# Patient Record
Sex: MALE | Race: WHITE | NOT HISPANIC OR LATINO
[De-identification: names, ages, dates, MRNs, and addresses within clinical notes are randomized per-mention and may not be internally consistent; named-entity substitution may affect disease eponyms.]

---

## 2017-01-10 ENCOUNTER — TRANSCRIPTION ENCOUNTER (OUTPATIENT)
Age: 62
End: 2017-01-10

## 2017-03-15 ENCOUNTER — OUTPATIENT (OUTPATIENT)
Dept: OUTPATIENT SERVICES | Facility: HOSPITAL | Age: 62
LOS: 1 days | End: 2017-03-15
Payer: COMMERCIAL

## 2017-03-15 ENCOUNTER — APPOINTMENT (OUTPATIENT)
Dept: MRI IMAGING | Facility: CLINIC | Age: 62
End: 2017-03-15

## 2017-03-15 DIAGNOSIS — Z00.8 ENCOUNTER FOR OTHER GENERAL EXAMINATION: ICD-10-CM

## 2017-03-15 PROCEDURE — 73721 MRI JNT OF LWR EXTRE W/O DYE: CPT

## 2017-04-14 ENCOUNTER — APPOINTMENT (OUTPATIENT)
Dept: MRI IMAGING | Facility: CLINIC | Age: 62
End: 2017-04-14

## 2017-04-14 ENCOUNTER — OUTPATIENT (OUTPATIENT)
Dept: OUTPATIENT SERVICES | Facility: HOSPITAL | Age: 62
LOS: 1 days | End: 2017-04-14
Payer: COMMERCIAL

## 2017-04-14 DIAGNOSIS — Z00.8 ENCOUNTER FOR OTHER GENERAL EXAMINATION: ICD-10-CM

## 2017-04-14 PROCEDURE — 72197 MRI PELVIS W/O & W/DYE: CPT

## 2017-04-14 PROCEDURE — A9585: CPT

## 2017-04-14 PROCEDURE — 82565 ASSAY OF CREATININE: CPT

## 2017-05-14 ENCOUNTER — EMERGENCY (EMERGENCY)
Facility: HOSPITAL | Age: 62
LOS: 0 days | Discharge: HOME | End: 2017-05-14
Attending: EMERGENCY MEDICINE | Admitting: EMERGENCY MEDICINE
Payer: OTHER MISCELLANEOUS

## 2017-05-14 VITALS — HEIGHT: 69 IN | WEIGHT: 169.98 LBS

## 2017-05-14 VITALS
RESPIRATION RATE: 18 BRPM | OXYGEN SATURATION: 100 % | SYSTOLIC BLOOD PRESSURE: 123 MMHG | DIASTOLIC BLOOD PRESSURE: 89 MMHG | HEART RATE: 53 BPM | TEMPERATURE: 99 F

## 2017-05-14 DIAGNOSIS — I82.441 ACUTE EMBOLISM AND THROMBOSIS OF RIGHT TIBIAL VEIN: ICD-10-CM

## 2017-05-14 DIAGNOSIS — I48.91 UNSPECIFIED ATRIAL FIBRILLATION: ICD-10-CM

## 2017-05-14 LAB
ANION GAP SERPL CALC-SCNC: 7 MMOL/L — SIGNIFICANT CHANGE UP (ref 5–17)
APTT BLD: 27.7 SEC — SIGNIFICANT CHANGE UP (ref 27.5–37.4)
BASOPHILS # BLD AUTO: 0.1 K/UL — SIGNIFICANT CHANGE UP (ref 0–0.2)
BASOPHILS NFR BLD AUTO: 1.6 % — SIGNIFICANT CHANGE UP (ref 0–2)
BUN SERPL-MCNC: 17 MG/DL — SIGNIFICANT CHANGE UP (ref 7–23)
CALCIUM SERPL-MCNC: 9.4 MG/DL — SIGNIFICANT CHANGE UP (ref 8.5–10.1)
CHLORIDE SERPL-SCNC: 101 MMOL/L — SIGNIFICANT CHANGE UP (ref 96–108)
CO2 SERPL-SCNC: 32 MMOL/L — HIGH (ref 22–31)
CREAT SERPL-MCNC: 1.08 MG/DL — SIGNIFICANT CHANGE UP (ref 0.5–1.3)
EOSINOPHIL # BLD AUTO: 0.1 K/UL — SIGNIFICANT CHANGE UP (ref 0–0.5)
EOSINOPHIL NFR BLD AUTO: 0.8 % — SIGNIFICANT CHANGE UP (ref 0–6)
GLUCOSE SERPL-MCNC: 104 MG/DL — HIGH (ref 70–99)
HCT VFR BLD CALC: 47.7 % — SIGNIFICANT CHANGE UP (ref 39–50)
HGB BLD-MCNC: 15.5 G/DL — SIGNIFICANT CHANGE UP (ref 13–17)
INR BLD: 0.95 RATIO — SIGNIFICANT CHANGE UP (ref 0.88–1.16)
LYMPHOCYTES # BLD AUTO: 2.8 K/UL — SIGNIFICANT CHANGE UP (ref 1–3.3)
LYMPHOCYTES # BLD AUTO: 36.8 % — SIGNIFICANT CHANGE UP (ref 13–44)
MCHC RBC-ENTMCNC: 30.4 PG — SIGNIFICANT CHANGE UP (ref 27–34)
MCHC RBC-ENTMCNC: 32.4 GM/DL — SIGNIFICANT CHANGE UP (ref 32–36)
MCV RBC AUTO: 93.8 FL — SIGNIFICANT CHANGE UP (ref 80–100)
MONOCYTES # BLD AUTO: 0.5 K/UL — SIGNIFICANT CHANGE UP (ref 0–0.9)
MONOCYTES NFR BLD AUTO: 6.3 % — SIGNIFICANT CHANGE UP (ref 2–14)
NEUTROPHILS # BLD AUTO: 4.2 K/UL — SIGNIFICANT CHANGE UP (ref 1.8–7.4)
NEUTROPHILS NFR BLD AUTO: 54.6 % — SIGNIFICANT CHANGE UP (ref 43–77)
PLATELET # BLD AUTO: 209 K/UL — SIGNIFICANT CHANGE UP (ref 150–400)
POTASSIUM SERPL-MCNC: 4 MMOL/L — SIGNIFICANT CHANGE UP (ref 3.5–5.3)
POTASSIUM SERPL-SCNC: 4 MMOL/L — SIGNIFICANT CHANGE UP (ref 3.5–5.3)
PROTHROM AB SERPL-ACNC: 10.2 SEC — SIGNIFICANT CHANGE UP (ref 9.8–12.7)
RBC # BLD: 5.08 M/UL — SIGNIFICANT CHANGE UP (ref 4.2–5.8)
RBC # FLD: 12.4 % — SIGNIFICANT CHANGE UP (ref 10.3–14.5)
SODIUM SERPL-SCNC: 140 MMOL/L — SIGNIFICANT CHANGE UP (ref 135–145)
WBC # BLD: 7.7 K/UL — SIGNIFICANT CHANGE UP (ref 3.8–10.5)
WBC # FLD AUTO: 7.7 K/UL — SIGNIFICANT CHANGE UP (ref 3.8–10.5)

## 2017-05-14 PROCEDURE — 99285 EMERGENCY DEPT VISIT HI MDM: CPT

## 2017-05-14 PROCEDURE — 93971 EXTREMITY STUDY: CPT | Mod: 26,RT

## 2017-05-14 RX ORDER — APIXABAN 2.5 MG/1
5 TABLET, FILM COATED ORAL
Qty: 0 | Refills: 0 | Status: DISCONTINUED | OUTPATIENT
Start: 2017-05-14 | End: 2017-05-14

## 2017-05-14 RX ORDER — APIXABAN 2.5 MG/1
1 TABLET, FILM COATED ORAL
Qty: 20 | Refills: 0 | OUTPATIENT
Start: 2017-05-14 | End: 2017-05-24

## 2017-05-14 RX ADMIN — APIXABAN 5 MILLIGRAM(S): 2.5 TABLET, FILM COATED ORAL at 13:39

## 2017-05-14 NOTE — ED STATDOCS - CHPI ED SYMPTOM NEG
no burning urination/no hematuria/no diarrhea/no dysuria/no palpitations/no fever/no nausea/no abdominal distention/no blood in stool/no vomiting

## 2017-05-14 NOTE — ED STATDOCS - NS ED MD SCRIBE ATTENDING SCRIBE SECTIONS
PAST MEDICAL/SURGICAL/SOCIAL HISTORY/HIV/REVIEW OF SYSTEMS/PROGRESS NOTE/CONSULTATIONS/SHIFT CHANGE/VITAL SIGNS( Pullset)/PHYSICAL EXAM/DISPOSITION/INTAKE ASSESSMENT/SCREENINGS/HISTORY OF PRESENT ILLNESS/RESULTS

## 2017-05-14 NOTE — ED STATDOCS - CARE PLAN
Principal Discharge DX:	Acute deep vein thrombosis (DVT) of tibial vein of right lower extremity  Secondary Diagnosis:	Thrombophlebitis

## 2017-05-14 NOTE — ED STATDOCS - PROGRESS NOTE DETAILS
Patient seen and evaluated, ED attending note and orders reviewed, will continue with patient follow up and care -Jeramy Farah PA-C Patient updated on labs and sono, case d/w his surgeon Dr. Rome Mullins [phone], he is ok with plan to place patient on xarelto and will arrange follow up with vascular, patient has not been taking ASA since the surgery, call placed to patient cardiologist Dr. Odonnell to discuss plan, awaiting call back -Jeramy Farah PA-C Case d/w Dr. Restrepo who is covering for Dr. Odonnell, he recommends eliquis 5mg BID and they will see him in office in 1 week -Jeramy Farah PA-C Counseled patient on DVT risks including PE, reviewed ss of emergency and when to return to ER, Patient denies CP and SOB at this time, understands that he must come to ER if these symptoms develop, counseled patient on eliquis for anticoagulation, patient is to avoid NSAIDs, he verbalized understanding, has appt with his surgeon in 3 days and will follow up with cardiology -Jeramy Farah PA-C

## 2017-05-14 NOTE — ED STATDOCS - MUSCULOSKELETAL, MLM
Right post knee tenderness, scar of arthroscopy ant right knee, range of motion is not limited and there is no muscle tenderness.

## 2017-05-14 NOTE — ED STATDOCS - OBJECTIVE STATEMENT
60 y/o M PMHx Arthroscopy, Afib, on asa, presents to the ED c/o right knee pain. The pt provides that he was referred to the ED for eval blood clot. The pt provides he had an arthroscopy done 2 days ago, and started having right post knee pain, and is unable to move the jt. No h./o sob, cp, cough, headache, fever, chills, dizziness, abd pain, nvd, or urinary incontinence.

## 2017-05-14 NOTE — ED STATDOCS - ATTENDING CONTRIBUTION TO CARE
I, Shae Mccarthy, performed the initial face to face bedside interview with this patient regarding history of present illness, review of symptoms and relevant past medical, social and family history.  I completed an independent physical examination.  I was the initial provider who evaluated this patient. I have signed out the follow up of any pending tests (i.e. labs, radiological studies) to the ACP.  I have communicated the patient’s plan of care and disposition with the ACP.  The history, relevant review of systems, past medical and surgical history, medical decision making, and physical examination was documented by the scribe in my presence and I attest to the accuracy of the documentation.  I personally saw the patient with the PA, and completed the key components of the history and physical exam. I then discussed the management plan with the PA.  Will anticoagulate for DVT - patient's cardiologist aware and will follow.

## 2017-06-29 ENCOUNTER — APPOINTMENT (OUTPATIENT)
Dept: MRI IMAGING | Facility: CLINIC | Age: 62
End: 2017-06-29

## 2017-06-29 ENCOUNTER — OUTPATIENT (OUTPATIENT)
Dept: OUTPATIENT SERVICES | Facility: HOSPITAL | Age: 62
LOS: 1 days | End: 2017-06-29
Payer: COMMERCIAL

## 2017-06-29 DIAGNOSIS — Z00.8 ENCOUNTER FOR OTHER GENERAL EXAMINATION: ICD-10-CM

## 2017-06-29 PROCEDURE — 73221 MRI JOINT UPR EXTREM W/O DYE: CPT

## 2017-08-24 ENCOUNTER — OUTPATIENT (OUTPATIENT)
Dept: OUTPATIENT SERVICES | Facility: HOSPITAL | Age: 62
LOS: 1 days | End: 2017-08-24
Payer: COMMERCIAL

## 2017-08-24 VITALS
WEIGHT: 167.99 LBS | DIASTOLIC BLOOD PRESSURE: 85 MMHG | RESPIRATION RATE: 16 BRPM | SYSTOLIC BLOOD PRESSURE: 145 MMHG | OXYGEN SATURATION: 99 % | HEIGHT: 68 IN | TEMPERATURE: 98 F | HEART RATE: 55 BPM

## 2017-08-24 DIAGNOSIS — M75.100 UNSPECIFIED ROTATOR CUFF TEAR OR RUPTURE OF UNSPECIFIED SHOULDER, NOT SPECIFIED AS TRAUMATIC: ICD-10-CM

## 2017-08-24 DIAGNOSIS — I82.409 ACUTE EMBOLISM AND THROMBOSIS OF UNSPECIFIED DEEP VEINS OF UNSPECIFIED LOWER EXTREMITY: ICD-10-CM

## 2017-08-24 DIAGNOSIS — Z98.890 OTHER SPECIFIED POSTPROCEDURAL STATES: Chronic | ICD-10-CM

## 2017-08-24 DIAGNOSIS — Z98.1 ARTHRODESIS STATUS: Chronic | ICD-10-CM

## 2017-08-24 DIAGNOSIS — Z01.818 ENCOUNTER FOR OTHER PREPROCEDURAL EXAMINATION: ICD-10-CM

## 2017-08-24 DIAGNOSIS — S43.91XA SPRAIN OF UNSPECIFIED PARTS OF RIGHT SHOULDER GIRDLE, INITIAL ENCOUNTER: ICD-10-CM

## 2017-08-24 LAB
ANION GAP SERPL CALC-SCNC: 11 MMOL/L — SIGNIFICANT CHANGE UP (ref 5–17)
BUN SERPL-MCNC: 12 MG/DL — SIGNIFICANT CHANGE UP (ref 7–23)
CALCIUM SERPL-MCNC: 9.5 MG/DL — SIGNIFICANT CHANGE UP (ref 8.4–10.5)
CHLORIDE SERPL-SCNC: 99 MMOL/L — SIGNIFICANT CHANGE UP (ref 96–108)
CO2 SERPL-SCNC: 29 MMOL/L — SIGNIFICANT CHANGE UP (ref 22–31)
CREAT SERPL-MCNC: 0.99 MG/DL — SIGNIFICANT CHANGE UP (ref 0.5–1.3)
GLUCOSE SERPL-MCNC: 102 MG/DL — HIGH (ref 70–99)
HCT VFR BLD CALC: 45.4 % — SIGNIFICANT CHANGE UP (ref 39–50)
HGB BLD-MCNC: 15.4 G/DL — SIGNIFICANT CHANGE UP (ref 13–17)
MCHC RBC-ENTMCNC: 30.9 PG — SIGNIFICANT CHANGE UP (ref 27–34)
MCHC RBC-ENTMCNC: 33.9 GM/DL — SIGNIFICANT CHANGE UP (ref 32–36)
MCV RBC AUTO: 91.2 FL — SIGNIFICANT CHANGE UP (ref 80–100)
PLATELET # BLD AUTO: 241 K/UL — SIGNIFICANT CHANGE UP (ref 150–400)
POTASSIUM SERPL-MCNC: 4.9 MMOL/L — SIGNIFICANT CHANGE UP (ref 3.5–5.3)
POTASSIUM SERPL-SCNC: 4.9 MMOL/L — SIGNIFICANT CHANGE UP (ref 3.5–5.3)
RBC # BLD: 4.98 M/UL — SIGNIFICANT CHANGE UP (ref 4.2–5.8)
RBC # FLD: 13 % — SIGNIFICANT CHANGE UP (ref 10.3–14.5)
SODIUM SERPL-SCNC: 139 MMOL/L — SIGNIFICANT CHANGE UP (ref 135–145)
WBC # BLD: 4.6 K/UL — SIGNIFICANT CHANGE UP (ref 3.8–10.5)
WBC # FLD AUTO: 4.6 K/UL — SIGNIFICANT CHANGE UP (ref 3.8–10.5)

## 2017-08-24 PROCEDURE — G0463: CPT

## 2017-08-24 PROCEDURE — 80048 BASIC METABOLIC PNL TOTAL CA: CPT

## 2017-08-24 PROCEDURE — 85027 COMPLETE CBC AUTOMATED: CPT

## 2017-08-24 RX ORDER — LIDOCAINE HCL 20 MG/ML
0.2 VIAL (ML) INJECTION ONCE
Qty: 0 | Refills: 0 | Status: DISCONTINUED | OUTPATIENT
Start: 2017-09-07 | End: 2017-09-22

## 2017-08-24 RX ORDER — CELECOXIB 200 MG/1
200 CAPSULE ORAL ONCE
Qty: 0 | Refills: 0 | Status: COMPLETED | OUTPATIENT
Start: 2017-09-07 | End: 2017-09-07

## 2017-08-24 RX ORDER — ACETAMINOPHEN 500 MG
975 TABLET ORAL ONCE
Qty: 0 | Refills: 0 | Status: COMPLETED | OUTPATIENT
Start: 2017-09-07 | End: 2017-09-07

## 2017-08-24 RX ORDER — SODIUM CHLORIDE 9 MG/ML
3 INJECTION INTRAMUSCULAR; INTRAVENOUS; SUBCUTANEOUS EVERY 8 HOURS
Qty: 0 | Refills: 0 | Status: DISCONTINUED | OUTPATIENT
Start: 2017-09-07 | End: 2017-09-22

## 2017-08-24 NOTE — H&P PST ADULT - FAMILY HISTORY
Father  Still living? No  Melanoma, Age at diagnosis: Age Unknown     Sibling  Still living? No  Family history of stroke, Age at diagnosis: Age Unknown

## 2017-08-24 NOTE — H&P PST ADULT - HISTORY OF PRESENT ILLNESS
This is a 62 y/o male with PMH: Paroxysmal Afib: Total 2 episodes ' 2010 and '2012.Currently: NSR, on no meds fro rate control.  s/p (5/2017): Right Knee Arthroscopy. *Developed + RLE DVT 1 day post-op: Currently on Eliquis as prophylaxis: Last pre-op dose is 8-31-17 as per PMD; Degenerative Lumbar Spine, Lumbar Spinal Strenosis, Herniated Cervical Disc '91: s/p Cervical  laminectomy with Fusion. Now dx; Right Rotator cuff Tear. Scheduled: Right Shoulder Arthroscpy/ Acromioplasty/ Repair Rotator Cuff Tear.

## 2017-08-24 NOTE — H&P PST ADULT - PMH
Degenerative lumbar spinal stenosis    DVT (deep venous thrombosis)  5/2017    One day post-op for right Knee Arthroscopy. Currently on Eliquis  Paroxysmal a-fib  '2010 and '2012    Two episodes  Pilonidal cyst  ' 87  Rotator cuff tear  '95 and '96   Left

## 2017-08-24 NOTE — H&P PST ADULT - PSH
History of excision of pilonidal cyst  ' 87  S/P right knee arthroscopy  5/2017  S/P rotator cuff repair  ' 95 and '96    Left  Status post colonoscopy  ' 2015   negative  Status post laminectomy with spinal fusion  ' 1991:  Cervical

## 2017-08-24 NOTE — H&P PST ADULT - NSANTHOSAYNRD_GEN_A_CORE
No. JORGE screening performed.  STOP BANG Legend: 0-2 = LOW Risk; 3-4 = INTERMEDIATE Risk; 5-8 = HIGH Risk Neck 15 in./No. JORGE screening performed.  STOP BANG Legend: 0-2 = LOW Risk; 3-4 = INTERMEDIATE Risk; 5-8 = HIGH Risk

## 2017-08-24 NOTE — H&P PST ADULT - PROBLEM SELECTOR PLAN 2
5/207: + RLE  DVT     One day post-op Right Knee Arthroscopy. Currently on Eliquis as DVT prophylaxis.  plan: Last dose Eliquis is 8-31-17 as per PMD

## 2017-09-07 ENCOUNTER — OUTPATIENT (OUTPATIENT)
Dept: OUTPATIENT SERVICES | Facility: HOSPITAL | Age: 62
LOS: 1 days | Discharge: HOME | End: 2017-09-07
Payer: COMMERCIAL

## 2017-09-07 VITALS
WEIGHT: 167.99 LBS | HEIGHT: 68 IN | RESPIRATION RATE: 20 BRPM | DIASTOLIC BLOOD PRESSURE: 81 MMHG | HEART RATE: 59 BPM | TEMPERATURE: 98 F | SYSTOLIC BLOOD PRESSURE: 155 MMHG | OXYGEN SATURATION: 100 %

## 2017-09-07 VITALS
SYSTOLIC BLOOD PRESSURE: 122 MMHG | TEMPERATURE: 97 F | RESPIRATION RATE: 16 BRPM | DIASTOLIC BLOOD PRESSURE: 72 MMHG | HEART RATE: 70 BPM | OXYGEN SATURATION: 100 %

## 2017-09-07 DIAGNOSIS — Z98.890 OTHER SPECIFIED POSTPROCEDURAL STATES: Chronic | ICD-10-CM

## 2017-09-07 DIAGNOSIS — Z98.1 ARTHRODESIS STATUS: Chronic | ICD-10-CM

## 2017-09-07 DIAGNOSIS — Z01.818 ENCOUNTER FOR OTHER PREPROCEDURAL EXAMINATION: ICD-10-CM

## 2017-09-07 DIAGNOSIS — S43.91XA SPRAIN OF UNSPECIFIED PARTS OF RIGHT SHOULDER GIRDLE, INITIAL ENCOUNTER: ICD-10-CM

## 2017-09-07 PROCEDURE — C1713: CPT

## 2017-09-07 PROCEDURE — 29827 SHO ARTHRS SRG RT8TR CUF RPR: CPT | Mod: RT

## 2017-09-07 PROCEDURE — 29826 SHO ARTHRS SRG DECOMPRESSION: CPT | Mod: RT

## 2017-09-07 RX ORDER — SODIUM CHLORIDE 9 MG/ML
1000 INJECTION, SOLUTION INTRAVENOUS
Qty: 0 | Refills: 0 | Status: DISCONTINUED | OUTPATIENT
Start: 2017-09-07 | End: 2017-09-22

## 2017-09-07 RX ORDER — ONDANSETRON 8 MG/1
4 TABLET, FILM COATED ORAL ONCE
Qty: 0 | Refills: 0 | Status: DISCONTINUED | OUTPATIENT
Start: 2017-09-07 | End: 2017-09-22

## 2017-09-07 RX ORDER — CELECOXIB 200 MG/1
200 CAPSULE ORAL ONCE
Qty: 0 | Refills: 0 | Status: DISCONTINUED | OUTPATIENT
Start: 2017-09-07 | End: 2017-09-22

## 2017-09-07 RX ORDER — CEFAZOLIN SODIUM 1 G
2000 VIAL (EA) INJECTION ONCE
Qty: 0 | Refills: 0 | Status: COMPLETED | OUTPATIENT
Start: 2017-09-07 | End: 2017-09-07

## 2017-09-07 RX ORDER — OXYCODONE HYDROCHLORIDE 5 MG/1
10 TABLET ORAL ONCE
Qty: 0 | Refills: 0 | Status: DISCONTINUED | OUTPATIENT
Start: 2017-09-07 | End: 2017-09-07

## 2017-09-07 RX ADMIN — CELECOXIB 200 MILLIGRAM(S): 200 CAPSULE ORAL at 06:34

## 2017-09-07 RX ADMIN — Medication 975 MILLIGRAM(S): at 06:34

## 2017-09-07 NOTE — PRE-ANESTHESIA EVALUATION ADULT - NSANTHOSAYNRD_GEN_A_CORE
Neck 15 in./No. JORGE screening performed.  STOP BANG Legend: 0-2 = LOW Risk; 3-4 = INTERMEDIATE Risk; 5-8 = HIGH Risk

## 2017-09-07 NOTE — PRE-ANESTHESIA EVALUATION ADULT - NSANTHADDINFOFT_GEN_ALL_CORE
DVT prophylaxis discussed with surgeon. agrees to sub Q heparin, seq stockings, and post op eliquis.

## 2017-09-07 NOTE — PRE-ANESTHESIA EVALUATION ADULT - NSANTHPMHFT_GEN_ALL_CORE
denies pulm dis  no gerd  cervical spine dis s/p lami 1991 asypmtomatic since  ?DVT vs. superficial thrombosis 3/2017 treated with eliquis.  pt states complete resolution on scan done last week.   off eliquis 3 days

## 2017-09-08 ENCOUNTER — TRANSCRIPTION ENCOUNTER (OUTPATIENT)
Age: 62
End: 2017-09-08

## 2017-10-02 ENCOUNTER — TRANSCRIPTION ENCOUNTER (OUTPATIENT)
Age: 62
End: 2017-10-02

## 2018-07-31 NOTE — ASU PATIENT PROFILE, ADULT - ATTEMPT TO OOB
no Consent (Marginal Mandibular)/Introductory Paragraph: The rationale for Mohs was explained to the patient and consent was obtained. The risks, benefits and alternatives to therapy were discussed in detail. Specifically, the risks of damage to the marginal mandibular branch of the facial nerve, infection, scarring, bleeding, prolonged wound healing, incomplete removal, allergy to anesthesia, and recurrence were addressed. Prior to the procedure, the treatment site was clearly identified and confirmed by the patient. All components of Universal Protocol/PAUSE Rule completed.

## 2019-04-16 ENCOUNTER — INPATIENT (INPATIENT)
Facility: HOSPITAL | Age: 64
LOS: 0 days | Discharge: HOME | End: 2019-04-17
Attending: HOSPITALIST | Admitting: HOSPITALIST
Payer: MEDICARE

## 2019-04-16 VITALS
SYSTOLIC BLOOD PRESSURE: 156 MMHG | WEIGHT: 171.96 LBS | OXYGEN SATURATION: 100 % | TEMPERATURE: 98 F | HEIGHT: 68 IN | RESPIRATION RATE: 18 BRPM | HEART RATE: 90 BPM | DIASTOLIC BLOOD PRESSURE: 92 MMHG

## 2019-04-16 DIAGNOSIS — Z98.890 OTHER SPECIFIED POSTPROCEDURAL STATES: Chronic | ICD-10-CM

## 2019-04-16 DIAGNOSIS — Z98.1 ARTHRODESIS STATUS: Chronic | ICD-10-CM

## 2019-04-16 PROBLEM — M48.06 SPINAL STENOSIS, LUMBAR REGION: Chronic | Status: ACTIVE | Noted: 2017-08-24

## 2019-04-16 PROBLEM — I48.0 PAROXYSMAL ATRIAL FIBRILLATION: Chronic | Status: ACTIVE | Noted: 2017-08-24

## 2019-04-16 LAB
ALBUMIN SERPL ELPH-MCNC: 4.5 G/DL — SIGNIFICANT CHANGE UP (ref 3.3–5)
ALP SERPL-CCNC: 104 U/L — SIGNIFICANT CHANGE UP (ref 40–120)
ALT FLD-CCNC: 33 U/L — SIGNIFICANT CHANGE UP (ref 12–78)
ANION GAP SERPL CALC-SCNC: 7 MMOL/L — SIGNIFICANT CHANGE UP (ref 5–17)
APTT BLD: 27.4 SEC — LOW (ref 27.5–36.3)
AST SERPL-CCNC: 22 U/L — SIGNIFICANT CHANGE UP (ref 15–37)
BILIRUB SERPL-MCNC: 1.1 MG/DL — SIGNIFICANT CHANGE UP (ref 0.2–1.2)
BUN SERPL-MCNC: 28 MG/DL — HIGH (ref 7–23)
CALCIUM SERPL-MCNC: 9.5 MG/DL — SIGNIFICANT CHANGE UP (ref 8.5–10.1)
CHLORIDE SERPL-SCNC: 105 MMOL/L — SIGNIFICANT CHANGE UP (ref 96–108)
CO2 SERPL-SCNC: 31 MMOL/L — SIGNIFICANT CHANGE UP (ref 22–31)
CREAT SERPL-MCNC: 1.16 MG/DL — SIGNIFICANT CHANGE UP (ref 0.5–1.3)
D DIMER BLD IA.RAPID-MCNC: 180 NG/ML DDU — SIGNIFICANT CHANGE UP
GLUCOSE SERPL-MCNC: 138 MG/DL — HIGH (ref 70–99)
HCT VFR BLD CALC: 47.4 % — SIGNIFICANT CHANGE UP (ref 39–50)
HGB BLD-MCNC: 15.7 G/DL — SIGNIFICANT CHANGE UP (ref 13–17)
INR BLD: 1 RATIO — SIGNIFICANT CHANGE UP (ref 0.88–1.16)
MAGNESIUM SERPL-MCNC: 2.2 MG/DL — SIGNIFICANT CHANGE UP (ref 1.6–2.6)
MCHC RBC-ENTMCNC: 31.1 PG — SIGNIFICANT CHANGE UP (ref 27–34)
MCHC RBC-ENTMCNC: 33.1 GM/DL — SIGNIFICANT CHANGE UP (ref 32–36)
MCV RBC AUTO: 93.9 FL — SIGNIFICANT CHANGE UP (ref 80–100)
NRBC # BLD: 0 /100 WBCS — SIGNIFICANT CHANGE UP (ref 0–0)
NT-PROBNP SERPL-SCNC: 66 PG/ML — SIGNIFICANT CHANGE UP (ref 0–125)
PLATELET # BLD AUTO: 252 K/UL — SIGNIFICANT CHANGE UP (ref 150–400)
POTASSIUM SERPL-MCNC: 4.1 MMOL/L — SIGNIFICANT CHANGE UP (ref 3.5–5.3)
POTASSIUM SERPL-SCNC: 4.1 MMOL/L — SIGNIFICANT CHANGE UP (ref 3.5–5.3)
PROT SERPL-MCNC: 8.1 GM/DL — SIGNIFICANT CHANGE UP (ref 6–8.3)
PROTHROM AB SERPL-ACNC: 11.1 SEC — SIGNIFICANT CHANGE UP (ref 10–12.9)
RBC # BLD: 5.05 M/UL — SIGNIFICANT CHANGE UP (ref 4.2–5.8)
RBC # FLD: 12.6 % — SIGNIFICANT CHANGE UP (ref 10.3–14.5)
SODIUM SERPL-SCNC: 143 MMOL/L — SIGNIFICANT CHANGE UP (ref 135–145)
TROPONIN I SERPL-MCNC: <0.015 NG/ML — SIGNIFICANT CHANGE UP (ref 0.01–0.04)
WBC # BLD: 11.91 K/UL — HIGH (ref 3.8–10.5)
WBC # FLD AUTO: 11.91 K/UL — HIGH (ref 3.8–10.5)

## 2019-04-16 PROCEDURE — 99291 CRITICAL CARE FIRST HOUR: CPT

## 2019-04-16 PROCEDURE — 71045 X-RAY EXAM CHEST 1 VIEW: CPT | Mod: 26

## 2019-04-16 PROCEDURE — 93010 ELECTROCARDIOGRAM REPORT: CPT

## 2019-04-16 RX ORDER — DOCUSATE SODIUM 100 MG
100 CAPSULE ORAL THREE TIMES A DAY
Qty: 0 | Refills: 0 | Status: DISCONTINUED | OUTPATIENT
Start: 2019-04-16 | End: 2019-04-17

## 2019-04-16 RX ORDER — ASPIRIN/CALCIUM CARB/MAGNESIUM 324 MG
324 TABLET ORAL ONCE
Qty: 0 | Refills: 0 | Status: COMPLETED | OUTPATIENT
Start: 2019-04-16 | End: 2019-04-16

## 2019-04-16 RX ORDER — SODIUM CHLORIDE 9 MG/ML
1000 INJECTION INTRAMUSCULAR; INTRAVENOUS; SUBCUTANEOUS ONCE
Qty: 0 | Refills: 0 | Status: COMPLETED | OUTPATIENT
Start: 2019-04-16 | End: 2019-04-16

## 2019-04-16 RX ORDER — DILTIAZEM HCL 120 MG
30 CAPSULE, EXT RELEASE 24 HR ORAL ONCE
Qty: 0 | Refills: 0 | Status: COMPLETED | OUTPATIENT
Start: 2019-04-16 | End: 2019-04-16

## 2019-04-16 RX ORDER — METOPROLOL TARTRATE 50 MG
25 TABLET ORAL DAILY
Qty: 0 | Refills: 0 | Status: DISCONTINUED | OUTPATIENT
Start: 2019-04-16 | End: 2019-04-17

## 2019-04-16 RX ORDER — ACETAMINOPHEN 500 MG
650 TABLET ORAL EVERY 6 HOURS
Qty: 0 | Refills: 0 | Status: DISCONTINUED | OUTPATIENT
Start: 2019-04-16 | End: 2019-04-17

## 2019-04-16 RX ORDER — DILTIAZEM HCL 120 MG
10 CAPSULE, EXT RELEASE 24 HR ORAL ONCE
Qty: 0 | Refills: 0 | Status: COMPLETED | OUTPATIENT
Start: 2019-04-16 | End: 2019-04-16

## 2019-04-16 RX ORDER — DILTIAZEM HCL 120 MG
5 CAPSULE, EXT RELEASE 24 HR ORAL
Qty: 125 | Refills: 0 | Status: DISCONTINUED | OUTPATIENT
Start: 2019-04-16 | End: 2019-04-17

## 2019-04-16 RX ORDER — APIXABAN 2.5 MG/1
5 TABLET, FILM COATED ORAL EVERY 12 HOURS
Qty: 0 | Refills: 0 | Status: DISCONTINUED | OUTPATIENT
Start: 2019-04-16 | End: 2019-04-17

## 2019-04-16 RX ORDER — FLECAINIDE ACETATE 50 MG
300 TABLET ORAL ONCE
Qty: 0 | Refills: 0 | Status: COMPLETED | OUTPATIENT
Start: 2019-04-16 | End: 2019-04-16

## 2019-04-16 RX ADMIN — Medication 324 MILLIGRAM(S): at 02:00

## 2019-04-16 RX ADMIN — APIXABAN 5 MILLIGRAM(S): 2.5 TABLET, FILM COATED ORAL at 21:21

## 2019-04-16 RX ADMIN — Medication 10 MILLIGRAM(S): at 01:58

## 2019-04-16 RX ADMIN — Medication 300 MILLIGRAM(S): at 10:10

## 2019-04-16 RX ADMIN — APIXABAN 5 MILLIGRAM(S): 2.5 TABLET, FILM COATED ORAL at 10:11

## 2019-04-16 RX ADMIN — Medication 25 MILLIGRAM(S): at 10:10

## 2019-04-16 RX ADMIN — SODIUM CHLORIDE 1000 MILLILITER(S): 9 INJECTION INTRAMUSCULAR; INTRAVENOUS; SUBCUTANEOUS at 02:57

## 2019-04-16 RX ADMIN — Medication 5 MG/HR: at 02:58

## 2019-04-16 RX ADMIN — Medication 4 MILLIGRAM(S): at 21:22

## 2019-04-16 RX ADMIN — Medication 30 MILLIGRAM(S): at 02:00

## 2019-04-16 RX ADMIN — Medication 10 MILLIGRAM(S): at 02:57

## 2019-04-16 NOTE — H&P ADULT - NSHPPHYSICALEXAM_GEN_ALL_CORE
Vital Signs Last 24 Hrs  T(C): 36.8 (16 Apr 2019 05:52), Max: 36.8 (16 Apr 2019 05:52)  T(F): 98.3 (16 Apr 2019 05:52), Max: 98.3 (16 Apr 2019 05:52)  HR: 87 (16 Apr 2019 07:00) (79 - 155)  BP: 101/73 (16 Apr 2019 07:00) (95/61 - 156/92)  BP(mean): --  RR: 18 (16 Apr 2019 05:52) (18 - 18)  SpO2: 100% (16 Apr 2019 05:52) (100% - 100%)    Constitutional: NAD.   HEENT: PERRL, EOMI, MMM.  Neck: Soft and supple, No carotid bruit, No JVD  Respiratory: Breath sounds are clear bilaterally, No wheezing, rales or rhonchi  Cardiac:  (+) IRREGULAR.    Gastrointestinal: Bowel Sounds present, soft, nontender, nondistended, no guarding, no rebound, no mass.  Extremities: No peripheral edema  Vascular: 2+ peripheral pulses  Neurological: A/O x , no focal deficits  Musculoskeletal: 5/5 strength b/l upper and lower extremities  Skin:  no visible rashes.

## 2019-04-16 NOTE — PATIENT PROFILE ADULT - FALL HARM RISK
Your opinion matters! Thank you for choosing Dr. Savanah Flores at [facility]. You may receive a survey in the mail about today's visit.  We always appreciate feedback.  It was a pleasure to care for you today!     other

## 2019-04-16 NOTE — H&P ADULT - REASON FOR ADMISSION
Patient is a 63y old  Male who presents with a chief complaint of SOB/palpitations (16 Apr 2019 08:14)

## 2019-04-16 NOTE — H&P ADULT - HISTORY OF PRESENT ILLNESS
63M.  hx AF in past.  ASA daily due to CHADvasc 1.  presented to ED c/o palpitations.  onset ~2400H.  a/w SOB.  in ED diltiazem gtt started.  evaluated by Cardiology on 3E who started apixaban and flecainide.    ROS:  (+) plapiations;  SOB.  (-) CP-reports recent outpatient echo and cardiac ischemic w/u negative.    PMHx:  AF;  provoked DVT-completed AC.    PSHx:  R knee arthroscopy;  DDD of LS-laminectomy w/ spinal fusion;  rotator cuff repair;  pilonidal cyst.    ALL:  NKDA.    Rx:  reviwed.    SocHx:  .  no children.  semi-retired cardiac cath lab tech.  no tobacco, EtOH or illegal drugs.    FHx:  unknown.

## 2019-04-16 NOTE — CONSULT NOTE ADULT - SUBJECTIVE AND OBJECTIVE BOX
CHIEF COMPLAINT: Patient is a 63y old  Male who presents with a chief complaint of afib with RVR    HPI:  patient well known to me.  Hx of Afib x 2 in past-DERRICK vasc 1, no cardiac disease(no CAD, CHF, VT) with evaluation by EPS in past for ablation (Dr Gr at [facility]).  Evaluation was concluded with direction to use loading dose of flecainide for PAF.  Patient now in AFib for 3-4 hours.  Awoke from sleep at 5am.  Came to ER  No CP, SOB or syncope.  Baseline HR between 40-46 in sinus rhythm.      PMHx: PAST MEDICAL & SURGICAL HISTORY:  Rotator cuff tear: &#x27;95 and &#x27;96   Left  Pilonidal cyst: &#x27; 87  DVT (deep venous thrombosis): 5/2017    One day post-op for right Knee Arthroscopy. Currently on Eliquis  Degenerative lumbar spinal stenosis  Paroxysmal a-fib: &#x27;2010 and &#x27;2012    Two episodes  No pertinent past medical history  S/P rotator cuff repair: &#x27; 95 and &#x27;96    Left  Status post colonoscopy: &#x27; 2015   negative  S/P right knee arthroscopy: 5/2017  Status post laminectomy with spinal fusion: &#x27; 1991:  Cervical  History of excision of pilonidal cyst: &#x27; 87  No significant past surgical history        Soc Hx:       Allergies: Allergies    No Known Allergies    Intolerances          REVIEW OF SYSTEMS:    CONSTITUTIONAL: No weakness, fevers or chills  EYES/ENT: No visual changes;  No vertigo or throat pain   NECK: No pain or stiffness  RESPIRATORY: No cough, wheezing, hemoptysis; No shortness of breath  CARDIOVASCULAR: palpitations  GASTROINTESTINAL: No abdominal or epigastric pain. No nausea, vomiting, or hematemesis; No diarrhea or constipation. No melena or hematochezia.  GENITOURINARY: No dysuria, frequency or hematuria  NEUROLOGICAL: No numbness or weakness  SKIN: No itching, burning, rashes, or lesions   All other review of systems is negative unless indicated above    Vital Signs Last 24 Hrs  T(C): 36.8 (16 Apr 2019 05:52), Max: 36.8 (16 Apr 2019 05:52)  T(F): 98.3 (16 Apr 2019 05:52), Max: 98.3 (16 Apr 2019 05:52)  HR: 87 (16 Apr 2019 07:00) (79 - 155)  BP: 101/73 (16 Apr 2019 07:00) (95/61 - 156/92)  BP(mean): --  RR: 18 (16 Apr 2019 05:52) (18 - 18)  SpO2: 100% (16 Apr 2019 05:52) (100% - 100%)    I&O's Summary      CAPILLARY BLOOD GLUCOSE          PHYSICAL EXAM:   Constitutional: NAD, awake and alert, well-developed  HEENT: PERR, EOMI, Normal Hearing, MMM  Neck: Soft and supple, No LAD, No JVD  Respiratory: Breath sounds are clear bilaterally, No wheezing, rales or rhonchi  Cardiovascular: S1 and S2, irregular rate and rhythm, , gallops   Gastrointestinal: Bowel Sounds present, soft, nontender, nondistended, no guarding, no rebound  Extremities: No peripheral edema  Vascular: 2+ peripheral pulses  Neurological: A/O x 3, no focal deficits  Musculoskeletal: 5/5 strength b/l upper and lower extremities  Skin: No rashes    MEDICATIONS:  MEDICATIONS  (STANDING):  diltiazem Infusion 5 mG/Hr (5 mL/Hr) IV Continuous <Continuous>      LABS: All Labs Reviewed:                        15.7   11.91 )-----------( 252      ( 16 Apr 2019 01:52 )             47.4     04-16    143  |  105  |  28<H>  ----------------------------<  138<H>  4.1   |  31  |  1.16    Ca    9.5      16 Apr 2019 01:52  Mg     2.2     04-16    TPro  8.1  /  Alb  4.5  /  TBili  1.1  /  DBili  x   /  AST  22  /  ALT  33  /  AlkPhos  104  04-16    PT/INR - ( 16 Apr 2019 01:52 )   PT: 11.1 sec;   INR: 1.00 ratio         PTT - ( 16 Apr 2019 01:52 )  PTT:27.4 sec  CARDIAC MARKERS ( 16 Apr 2019 01:52 )  <0.015 ng/mL / x     / x     / x     / x          Serum Pro-Brain Natriuretic Peptide: 66 pg/mL (04-16 @ 01:52)    Blood Culture:   lipid profile       RADIOLOGY:    EKG:  atrial fibrillation, 176bpm, RVR, non specific st changes    Telemetry:  afib 100-120bpm    ECHO:

## 2019-04-16 NOTE — CONSULT NOTE ADULT - ASSESSMENT
patient with AFib/RVR-given ivp cardizem in ER  1-AFib-give flecainide 300mg po x 1.  Start eliquis 5mg BID, if no cardioversion, add toprol 25mg and will do LUBNA CV tomorrow  2-CHF, CAD-no evidence of disease.

## 2019-04-16 NOTE — ED ADULT NURSE NOTE - NSIMPLEMENTINTERV_GEN_ALL_ED
Implemented All Universal Safety Interventions:  [place] to call system. Call bell, personal items and telephone within reach. Instruct patient to call for assistance. Room bathroom lighting operational. Non-slip footwear when patient is off stretcher. Physically safe environment: no spills, clutter or unnecessary equipment. Stretcher in lowest position, wheels locked, appropriate side rails in place.

## 2019-04-16 NOTE — ED PROVIDER NOTE - PSH
History of excision of pilonidal cyst  ' 87  No significant past surgical history    S/P right knee arthroscopy  5/2017  S/P rotator cuff repair  ' 95 and '96    Left  Status post colonoscopy  ' 2015   negative  Status post laminectomy with spinal fusion  ' 1991:  Cervical

## 2019-04-16 NOTE — ED PROVIDER NOTE - PMH
Degenerative lumbar spinal stenosis    DVT (deep venous thrombosis)  5/2017    One day post-op for right Knee Arthroscopy. Currently on Eliquis  No pertinent past medical history    Paroxysmal a-fib  '2010 and '2012    Two episodes  Pilonidal cyst  ' 87  Rotator cuff tear  '95 and '96   Left

## 2019-04-16 NOTE — ED PROVIDER NOTE - OBJECTIVE STATEMENT
62 yo male on daily aspirin for ho afib pw palpitations and sscp that started at 210 tonight. pcp Dr. Keren Odonnell

## 2019-04-16 NOTE — H&P ADULT - ASSESSMENT
63M.  hx AF in past.  ASA daily due to CHADvasc 1.  presented to ED c/o palpitations.  onset ~2400H.  a/w SOB.  in ED diltiazem gtt started.  evaluated by Cardiology on 3E who started apixaban and flecainide.    PMHx:  AF;  provoked DVT-completed AC.    AF w/ RVR.  -telemetry monitoring.  -keep K >= 4.0 and Mg >=2.0.  -rate control: diltiazem gtt.  titrate to HR < 110 BPM.  -rhythm control: flecainide.  EKG daily.  document stability of QTc.  -anticoagulation: apixaban.  -Cardiology.    DVT prophylaxis.  -DOAC.    disposition.  -telemetry.    communication.  -RN.  -Cardiology. 63M.  hx AF in past.  ASA daily due to CHADvasc 1.  presented to ED c/o palpitations.  onset ~2400H.  a/w SOB.  in ED diltiazem gtt started.  evaluated by Cardiology on 3E who started apixaban and flecainide.    PMHx:  AF;  provoked DVT-completed AC.    AF w/ RVR.  -telemetry monitoring.  -keep K >= 4.0 and Mg >=2.0.  -rate control: diltiazem gtt.  titrate to HR < 110 BPM.  -rhythm control: flecainide.  EKG daily.  document stability of QTc.  if no conversion back to NSR, LUBNA and DCCV in AM.  -anticoagulation: apixaban.  -Cardiology.    DVT prophylaxis.  -DOAC.    disposition.  -telemetry.    communication.  -RN.  -Cardiology.

## 2019-04-16 NOTE — ED ADULT NURSE NOTE - OBJECTIVE STATEMENT
pt. c/o of palpitations, diaphoresis and SOB on exertion that woke him up out sleep. pt. states hx of afib but only takes daily ASA. pt. denies CP, dizziness, weakness, n/v/d, abd. pain, fever. pt. is able to speak in full sentences without increased WOB.

## 2019-04-17 ENCOUNTER — TRANSCRIPTION ENCOUNTER (OUTPATIENT)
Age: 64
End: 2019-04-17

## 2019-04-17 VITALS — WEIGHT: 133.38 LBS

## 2019-04-17 LAB
ANION GAP SERPL CALC-SCNC: 4 MMOL/L — LOW (ref 5–17)
BUN SERPL-MCNC: 26 MG/DL — HIGH (ref 7–23)
CALCIUM SERPL-MCNC: 8.8 MG/DL — SIGNIFICANT CHANGE UP (ref 8.5–10.1)
CHLORIDE SERPL-SCNC: 107 MMOL/L — SIGNIFICANT CHANGE UP (ref 96–108)
CO2 SERPL-SCNC: 29 MMOL/L — SIGNIFICANT CHANGE UP (ref 22–31)
CREAT SERPL-MCNC: 1.1 MG/DL — SIGNIFICANT CHANGE UP (ref 0.5–1.3)
ESTIMATED AVERAGE GLUCOSE: 111 MG/DL — SIGNIFICANT CHANGE UP (ref 68–114)
GLUCOSE SERPL-MCNC: 114 MG/DL — HIGH (ref 70–99)
HBA1C BLD-MCNC: 5.5 % — SIGNIFICANT CHANGE UP (ref 4–5.6)
HCV AB S/CO SERPL IA: 0.11 S/CO — SIGNIFICANT CHANGE UP (ref 0–0.99)
HCV AB SERPL-IMP: SIGNIFICANT CHANGE UP
MAGNESIUM SERPL-MCNC: 2.2 MG/DL — SIGNIFICANT CHANGE UP (ref 1.6–2.6)
POTASSIUM SERPL-MCNC: 4.6 MMOL/L — SIGNIFICANT CHANGE UP (ref 3.5–5.3)
POTASSIUM SERPL-SCNC: 4.6 MMOL/L — SIGNIFICANT CHANGE UP (ref 3.5–5.3)
SODIUM SERPL-SCNC: 140 MMOL/L — SIGNIFICANT CHANGE UP (ref 135–145)

## 2019-04-17 PROCEDURE — 93010 ELECTROCARDIOGRAM REPORT: CPT

## 2019-04-17 RX ORDER — APIXABAN 2.5 MG/1
1 TABLET, FILM COATED ORAL
Qty: 0 | Refills: 0 | COMMUNITY

## 2019-04-17 RX ORDER — APIXABAN 2.5 MG/1
1 TABLET, FILM COATED ORAL
Qty: 60 | Refills: 0
Start: 2019-04-17 | End: 2019-05-16

## 2019-04-17 RX ORDER — METOPROLOL TARTRATE 50 MG
1 TABLET ORAL
Qty: 30 | Refills: 0 | OUTPATIENT
Start: 2019-04-17

## 2019-04-17 RX ORDER — MELOXICAM 15 MG/1
1 TABLET ORAL
Qty: 0 | Refills: 0 | COMMUNITY

## 2019-04-17 RX ORDER — METOPROLOL TARTRATE 50 MG
1 TABLET ORAL
Qty: 30 | Refills: 0
Start: 2019-04-17

## 2019-04-17 RX ORDER — RIVAROXABAN 15 MG-20MG
1 KIT ORAL
Qty: 30 | Refills: 0
Start: 2019-04-17 | End: 2019-05-16

## 2019-04-17 RX ADMIN — Medication 4 MILLIGRAM(S): at 06:15

## 2019-04-17 RX ADMIN — Medication 25 MILLIGRAM(S): at 09:23

## 2019-04-17 RX ADMIN — APIXABAN 5 MILLIGRAM(S): 2.5 TABLET, FILM COATED ORAL at 09:23

## 2019-04-17 NOTE — DISCHARGE NOTE NURSING/CASE MANAGEMENT/SOCIAL WORK - NSDCDPATPORTLINK_GEN_ALL_CORE
You can access the [facility] Patient Portal, offered by [facility], by registering with the following website: [email or web address]

## 2019-04-17 NOTE — PROGRESS NOTE ADULT - SUBJECTIVE AND OBJECTIVE BOX
CHIEF COMPLAINT: Patient is a 63y old  Male who presents with a chief complaint of Patient is a 63y old  Male who presents with a chief complaint of SOB/palpitations (16 Apr 2019 08:14) (16 Apr 2019 08:52)      HPI:  63M.  hx AF in past.  ASA daily due to CHADvasc 1.  presented to ED c/o palpitations.  onset ~2400H.  a/w SOB.  in ED diltiazem gtt started.  evaluated by Cardiology on 3E who started apixaban and flecainide.    4/17-patient cardioverted with flecainide.  Toelrated well, on toprol 25 and apixiban.  Patient with no complaints,  Feeling well.    ROS:  (+) plapiations;  SOB.  (-) CP-reports recent outpatient echo and cardiac ischemic w/u negative.    PMHx:  AF;  provoked DVT-completed AC.    PSHx:  R knee arthroscopy;  DDD of LS-laminectomy w/ spinal fusion;  rotator cuff repair;  pilonidal cyst.    ALL:  NKDA.    Rx:  reviwed.    SocHx:  .  no children.  semi-retired cardiac cath lab tech.  no tobacco, EtOH or illegal drugs.    FHx:  unknown. (16 Apr 2019 08:52)      PMHx: PAST MEDICAL & SURGICAL HISTORY:  Rotator cuff tear: &#x27;95 and &#x27;96   Left  Pilonidal cyst: &#x27; 87  DVT (deep venous thrombosis): 5/2017    One day post-op for right Knee Arthroscopy. Currently on Eliquis  Degenerative lumbar spinal stenosis  Paroxysmal a-fib: &#x27;2010 and &#x27;2012    Two episodes  No pertinent past medical history  S/P rotator cuff repair: &#x27; 95 and &#x27;96    Left  Status post colonoscopy: &#x27; 2015   negative  S/P right knee arthroscopy: 5/2017  Status post laminectomy with spinal fusion: &#x27; 1991:  Cervical  History of excision of pilonidal cyst: &#x27; 87  No significant past surgical history      Allergies: Allergies    No Known Allergies    Intolerances          REVIEW OF SYSTEMS:    CONSTITUTIONAL: No weakness, fevers or chills  EYES/ENT: No visual changes;  No vertigo or throat pain   NECK: No pain or stiffness  RESPIRATORY: No cough, wheezing, hemoptysis; No shortness of breath  CARDIOVASCULAR: No chest pain or palpitations  GASTROINTESTINAL: No abdominal or epigastric pain. No nausea, vomiting, or hematemesis; No diarrhea or constipation. No melena or hematochezia.  GENITOURINARY: No dysuria, frequency or hematuria  NEUROLOGICAL: No numbness or weakness  SKIN: No itching, burning, rashes, or lesions   All other review of systems is negative unless indicated above    Vital Signs Last 24 Hrs  T(C): 37.8 (17 Apr 2019 05:11), Max: 37.8 (17 Apr 2019 05:11)  T(F): 100 (17 Apr 2019 05:11), Max: 100 (17 Apr 2019 05:11)  HR: 53 (17 Apr 2019 05:11) (53 - 101)  BP: 147/72 (17 Apr 2019 05:11) (109/73 - 147/72)  BP(mean): --  RR: 17 (17 Apr 2019 05:11) (15 - 17)  SpO2: 100% (17 Apr 2019 05:11) (99% - 100%)    I&O's Summary          PHYSICAL EXAM:   Constitutional: NAD, awake and alert, well-developed  HEENT: PERR, EOMI, Normal Hearing, MMM  Neck: Soft and supple, No LAD, No JVD  Respiratory: Breath sounds are clear bilaterally, No wheezing, rales or rhonchi  Cardiovascular: S1 and S2, regular rate and rhythm, no Murmurs, gallops or rubs  Gastrointestinal: Bowel Sounds present, soft, nontender, nondistended, no guarding, no rebound  Extremities: No peripheral edema  Vascular: 2+ peripheral pulses  Neurological: A/O x 3, no focal deficits  Musculoskeletal: 5/5 strength b/l upper and lower extremities  Skin: No rashes    MEDICATIONS:  MEDICATIONS  (STANDING):  apixaban 5 milliGRAM(s) Oral every 12 hours  diltiazem Infusion 5 mG/Hr (5 mL/Hr) IV Continuous <Continuous>  docusate sodium 100 milliGRAM(s) Oral three times a day  metoprolol succinate ER 25 milliGRAM(s) Oral daily      LABS: All Labs Reviewed:                        15.7   11.91 )-----------( 252      ( 16 Apr 2019 01:52 )             47.4     04-17    140  |  107  |  26<H>  ----------------------------<  114<H>  4.6   |  29  |  1.10    Ca    8.8      17 Apr 2019 05:37  Mg     2.2     04-17    TPro  8.1  /  Alb  4.5  /  TBili  1.1  /  DBili  x   /  AST  22  /  ALT  33  /  AlkPhos  104  04-16    PT/INR - ( 16 Apr 2019 01:52 )   PT: 11.1 sec;   INR: 1.00 ratio         PTT - ( 16 Apr 2019 01:52 )  PTT:27.4 sec  CARDIAC MARKERS ( 16 Apr 2019 01:52 )  <0.015 ng/mL / x     / x     / x     / x          Blood Culture:       BNP Serum Pro-Brain Natriuretic Peptide: 66 pg/mL (04-16-19 @ 01:52)      RADIOLOGY:    EKG:      Telemetry:    ECHO:

## 2019-04-17 NOTE — DISCHARGE NOTE PROVIDER - NSDCCPCAREPLAN_GEN_ALL_CORE_FT
PRINCIPAL DISCHARGE DIAGNOSIS  Diagnosis: Atrial fibrillation with RVR  Assessment and Plan of Treatment:

## 2019-04-17 NOTE — DISCHARGE NOTE PROVIDER - HOSPITAL COURSE
Patient is a 63y old  Male who presents with a chief complaint of Patient is a 63y old  Male who presents with a chief complaint of SOB/palpitations (16 Apr 2019 08:14) (17 Apr 2019 07:56)            SUBJECTIVE:     HPI:    63M.  hx AF in past.  ASA daily due to CHADvasc 1.  presented to ED c/o palpitations.  onset ~2400H.  a/w SOB.  in ED diltiazem gtt initiated however patient converted s/p flecainide.      (-) CP-reports recent outpatient echo and cardiac ischemic w/u negative.        PMHx:  AF;  provoked DVT-completed AC.            ICU Vital Signs Last 24 Hrs    T(C): 37.8 (17 Apr 2019 05:11), Max: 37.8 (17 Apr 2019 05:11)    T(F): 100 (17 Apr 2019 05:11), Max: 100 (17 Apr 2019 05:11)    HR: 53 (17 Apr 2019 05:11) (53 - 101)    BP: 147/72 (17 Apr 2019 05:11) (109/73 - 147/72)    RR: 17 (17 Apr 2019 05:11) (15 - 17)    SpO2: 100% (17 Apr 2019 05:11) (99% - 100%)                        PHYSICAL EXAM:        Constitutional: NAD, awake and alert, well-developed    HEENT: PERR, EOMI, Normal Hearing, MMM    Neck: Soft and supple, No LAD, No JVD    Respiratory: Breath sounds are clear bilaterally, No wheezing, rales or rhonchi    Cardiovascular: S1 and S2, regular rate and rhythm, no Murmurs, gallops or rubs    Gastrointestinal: Bowel Sounds present, soft, nontender, nondistended, no guarding, no rebound    Extremities: No peripheral edema    Vascular: 2+ peripheral pulses    Neurological: A/O x 3, no focal deficits    Musculoskeletal: 5/5 strength b/l upper and lower extremities    Skin: No rashes                LABS: All Labs Reviewed:                            15.7     11.91 )-----------( 252      ( 16 Apr 2019 01:52 )               47.4     D/C to home. PCP will be notified upon discharge

## 2019-04-17 NOTE — PROGRESS NOTE ADULT - ASSESSMENT
patient with PAF with RVR, converted with flecainide.    1-continue toprol and elliquis  will f.u at office

## 2019-04-17 NOTE — DISCHARGE NOTE PROVIDER - CARE PROVIDER_API CALL
Dario Odonnell)  Cardiovascular Disease; Internal Medicine  [address], [address]  [place] [address]  Phone: [phone]  Fax: [phone]  Follow Up Time:

## 2019-04-24 DIAGNOSIS — Z79.01 LONG TERM (CURRENT) USE OF ANTICOAGULANTS: ICD-10-CM

## 2019-04-24 DIAGNOSIS — Z79.82 LONG TERM (CURRENT) USE OF ASPIRIN: ICD-10-CM

## 2019-04-24 DIAGNOSIS — R00.2 PALPITATIONS: ICD-10-CM

## 2019-04-24 DIAGNOSIS — I48.0 PAROXYSMAL ATRIAL FIBRILLATION: ICD-10-CM

## 2019-04-24 DIAGNOSIS — M48.061 SPINAL STENOSIS, LUMBAR REGION WITHOUT NEUROGENIC CLAUDICATION: ICD-10-CM

## 2019-04-24 DIAGNOSIS — Z86.718 PERSONAL HISTORY OF OTHER VENOUS THROMBOSIS AND EMBOLISM: ICD-10-CM

## 2019-07-01 ENCOUNTER — APPOINTMENT (OUTPATIENT)
Dept: NEUROSURGERY | Facility: CLINIC | Age: 64
End: 2019-07-01
Payer: MEDICARE

## 2019-07-01 VITALS
HEART RATE: 57 BPM | WEIGHT: 170 LBS | HEIGHT: 68 IN | OXYGEN SATURATION: 99 % | BODY MASS INDEX: 25.76 KG/M2 | SYSTOLIC BLOOD PRESSURE: 139 MMHG | TEMPERATURE: 98.7 F | DIASTOLIC BLOOD PRESSURE: 74 MMHG

## 2019-07-01 PROCEDURE — 99203 OFFICE O/P NEW LOW 30 MIN: CPT

## 2019-07-07 ENCOUNTER — FORM ENCOUNTER (OUTPATIENT)
Age: 64
End: 2019-07-07

## 2019-07-08 ENCOUNTER — OUTPATIENT (OUTPATIENT)
Dept: OUTPATIENT SERVICES | Facility: HOSPITAL | Age: 64
LOS: 1 days | End: 2019-07-08
Payer: MEDICARE

## 2019-07-08 ENCOUNTER — APPOINTMENT (OUTPATIENT)
Dept: RADIOLOGY | Facility: CLINIC | Age: 64
End: 2019-07-08
Payer: MEDICARE

## 2019-07-08 DIAGNOSIS — Z98.890 OTHER SPECIFIED POSTPROCEDURAL STATES: Chronic | ICD-10-CM

## 2019-07-08 DIAGNOSIS — Z98.1 ARTHRODESIS STATUS: Chronic | ICD-10-CM

## 2019-07-08 DIAGNOSIS — M54.5 LOW BACK PAIN: ICD-10-CM

## 2019-07-08 DIAGNOSIS — Z00.8 ENCOUNTER FOR OTHER GENERAL EXAMINATION: ICD-10-CM

## 2019-07-08 DIAGNOSIS — M43.17 SPONDYLOLISTHESIS, LUMBOSACRAL REGION: ICD-10-CM

## 2019-07-08 PROCEDURE — 72110 X-RAY EXAM L-2 SPINE 4/>VWS: CPT

## 2019-07-08 PROCEDURE — 72110 X-RAY EXAM L-2 SPINE 4/>VWS: CPT | Mod: 26

## 2019-07-22 ENCOUNTER — TRANSCRIPTION ENCOUNTER (OUTPATIENT)
Age: 64
End: 2019-07-22

## 2019-07-22 ENCOUNTER — APPOINTMENT (OUTPATIENT)
Dept: NEUROSURGERY | Facility: CLINIC | Age: 64
End: 2019-07-22
Payer: MEDICARE

## 2019-07-22 DIAGNOSIS — M54.16 RADICULOPATHY, LUMBAR REGION: ICD-10-CM

## 2019-07-22 PROCEDURE — 99214 OFFICE O/P EST MOD 30 MIN: CPT | Mod: 57

## 2019-07-24 ENCOUNTER — RX RENEWAL (OUTPATIENT)
Age: 64
End: 2019-07-24

## 2019-08-05 PROBLEM — M54.16 BILATERAL LUMBAR RADICULOPATHY: Status: ACTIVE | Noted: 2019-08-05

## 2019-08-05 NOTE — CONSULT LETTER
[Dear  ___] : Dear  [unfilled], [Sincerely,] : Sincerely, [FreeTextEntry2] : Dr. Keren Odonnell MD\par [address], \[place] [address] [FreeTextEntry1] : I have seen your patient Senthil Luther in my office to evaluate his ongoing problems with intense lower back pain with associated bilateral foot numbness. 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 He had a posterior cervical laminectomy procedure for stenosis performed back in 1991. He indicates his back pain has been progressive for more than 15 years. More recently in addition to progressive numbness he is having more concerning problems with mechanical back pain. He is undergone extensive conservative treatment including physical therapy, massage therapy, pain management, and epidural steroid injections. He has had a total of 4 epidural steroid injections since November of 2018. He has been evaluated by neurology and it is determined that he has bilateral L5-S1 radiculopathy. He denies any bowel or bladder dysfunction. The patient indicates that his pain is present daily and is essentially constant. The pain is intensified by prolonged standing or with dynamic range of motion activities.\par \par I have reviewed with the patient his available diagnostic studies. His most recent MRI scan of the lumbar spine from October 2018 identifies diffuse degenerative spondylosis of the lumbosacral spine. At L2-3 there is a retrolisthesis. At each level there is a degree of degenerative osteophytic ridging with disc bulging and compromise of the foramen. At L5-S1 there is a slight anterolisthesis without clear evidence of pars fracture. At L5-S1 the degenerative facets and ligament hypertrophy causing severe foraminal stenosis. I have also reviewed the  EMG nerve conduction study results which identify bilateral left greater than right radiculopathy at L5-S1\par \par On examination the patient is in apparent distress with lower back range of motion activities. There is some paraspinal muscular spasming. Straight leg raise is positive at approximate 45° of elevation. The patient does have objective findings of decreased sensation on the top of the foot lateral foot and sole of the foot left worse than right when using pinprick and light touch modalities. The patient has absent left ankle reflex and the right ankle reflexes minimal. The patellar reflexes are symmetric and normal. The patient does have weakness of plantar flexion bilaterally left worse on right and some mild weakness of extensor hallucis longus. There is no muscle wasting or fasciculations. Distal pulses and capillary refill are normal.\par \par The patient has certainly undergone appropriate conservative measures for degenerative lumbar spondylosis. Based on the physical examination, failure of conservative interventions over a prolonged period of time, EMG nerve conduction study confirmation of radiculopathy and MRI imaging he is appropriate candidate for decompression surgery. However, the patient has a significant mechanical component to his back pain and consideration of the need for decompression with fusion at L5-S1 is important. I have therefore asked the patient undergo dynamic flexion extension and standing x-rays of the lumbar spine before committing to a final surgical technique. The patient has indicated good understanding of my prescriptions and explanations. I will see the patient again as soon as this diagnostic study has been completed to review final surgical planning.\par \par Thank you for very kindly including me in the evaluation and treatment of your patient. Please do not hesitate to contact me should any questions or concerns regarding this evaluation or request for additional diagnostic imaging. [FreeTextEntry3] : Reg Medrano MD, PhD, FRCPSC\par Attending Neurosurgeon \par  of Neurosurgery\par [facility] \par [facility]\par Dept. of Neurosurgery\par [address] [place] [address] \par [address] [facility]\par [place], [place] [address]\par Tel [phone]\par Fax [phone]\par

## 2019-08-05 NOTE — CONSULT LETTER
[Dear  ___] : Dear  [unfilled], [Sincerely,] : Sincerely, [FreeTextEntry2] : Dr. Keren Odonnell MD\par [address], \[place] [address]  [FreeTextEntry1] : I have seen your patient Senthil Luther in my office for further evaluation of his ongoing problems with lower back pain and bilateral leg radiculopathy. 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 He has degenerative spinal history going back to 1991 when he underwent a posterior cervical laminectomy for stenosis with bilateral arm symptoms. The patient has undergone extensive conservative treatment for his more recent lower back issues. Over the past year he is undergone multiple epidural steroid injections without sustained relief. He has participated in physical therapy, massage therapy, and pain management. The patient indicates intense lower back pain especially with movement. He has progressive increasing numbness in both feet which is constant but increased with any prolonged standing or walking efforts. He denies any bowel or bladder dysfunction. There have been no changes in the patient's symptom profile since we saw him 3 weeks ago. The patient has now undergone dynamic standing x-rays of the lumbar spine and returns to discuss the findings and surgical intervention.\par \par I have reviewed with the patient and his standing x-rays of the lumbar spine. I have reviewed these images with the neuroradiologist. There is evidence of spondylolisthesis but there is no dynamic instability especially at the L5-S1 level. I have reviewed with the patient once again his MRI images of the lumbar spine. In particular we have reviewed the degree of degenerative spondylosis with facet arthropathy creating severe stenosis of the foramen at L5-S1 left worse than right.\par \par There is no change in the patient's neurologic examination since her previous assessment.\par \par I have had an extensive discussion with the patient regarding his treatment options. Of most importance the patient does not have severe central stenosis at the L5-S1 level. The EMG nerve conduction study clearly defines bilateral radiculopathy at this level. We have evaluated this patient's images at our spinal conference. In order to adequately decompress the patient is significant facet decompression will be required. This will create degree of instability as more than half of the facet joint would be compromised to adequately create decompression. The patient is therefore most appropriate to undergo a minimally invasive transforaminal lumbar interbody fusion with a left-sided approach. Neuro monitoring as well as spinal neuro navigation will also be used to support the surgery. Using surgical models as well as the patient's own images I have described this technique in detail. We have reviewed the potential risks and complications of this including new or persistent neurologic injury, failure of fusion, progressive degenerative adjacent segment disease, infection, hemorrhage, and postoperative pain and recovery expectations. The patient indicated good understanding and all questions have been fully addressed. The patient has provided informed consent to move forward with surgery at the soonest available time.\par \par Thank you for very kindly including me in the evaluation and treatment of your patient. Please do not hesitate to contact me should you have any questions or concerns regarding this evaluation, the patient's diagnosis, where the recommendation for lumbar decompression and fusion surgery using a minimally invasive technique at L5-S1. [FreeTextEntry3] : Reg Medrano MD, PhD, FRCPSC\par Attending Neurosurgeon \par  of Neurosurgery\par [facility] \par [facility]\par Dept. of Neurosurgery\par [address] [place] [address] \par [address] [facility]\par [place] [address]\par Tel [phone]\par Fax [phone]

## 2019-08-05 NOTE — REASON FOR VISIT
[New Patient Visit] : a new patient visit [FreeTextEntry1] : intense lower back pain with bilateral numbness of the feet.

## 2019-08-12 ENCOUNTER — OUTPATIENT (OUTPATIENT)
Dept: OUTPATIENT SERVICES | Facility: HOSPITAL | Age: 64
LOS: 1 days | End: 2019-08-12
Payer: MEDICARE

## 2019-08-12 VITALS
WEIGHT: 169.98 LBS | DIASTOLIC BLOOD PRESSURE: 86 MMHG | HEIGHT: 68 IN | OXYGEN SATURATION: 100 % | HEART RATE: 62 BPM | RESPIRATION RATE: 16 BRPM | TEMPERATURE: 99 F | SYSTOLIC BLOOD PRESSURE: 147 MMHG

## 2019-08-12 DIAGNOSIS — Z98.1 ARTHRODESIS STATUS: Chronic | ICD-10-CM

## 2019-08-12 DIAGNOSIS — Z98.890 OTHER SPECIFIED POSTPROCEDURAL STATES: Chronic | ICD-10-CM

## 2019-08-12 DIAGNOSIS — M54.5 LOW BACK PAIN: ICD-10-CM

## 2019-08-12 DIAGNOSIS — M43.17 SPONDYLOLISTHESIS, LUMBOSACRAL REGION: ICD-10-CM

## 2019-08-12 DIAGNOSIS — Z29.9 ENCOUNTER FOR PROPHYLACTIC MEASURES, UNSPECIFIED: ICD-10-CM

## 2019-08-12 LAB
ANION GAP SERPL CALC-SCNC: 3 MMOL/L — LOW (ref 5–17)
APPEARANCE UR: CLEAR — SIGNIFICANT CHANGE UP
APTT BLD: 31.9 SEC — SIGNIFICANT CHANGE UP (ref 27.5–36.3)
BASOPHILS # BLD AUTO: 0.05 K/UL — SIGNIFICANT CHANGE UP (ref 0–0.2)
BASOPHILS NFR BLD AUTO: 0.9 % — SIGNIFICANT CHANGE UP (ref 0–2)
BILIRUB UR-MCNC: NEGATIVE — SIGNIFICANT CHANGE UP
BUN SERPL-MCNC: 21 MG/DL — SIGNIFICANT CHANGE UP (ref 7–23)
CALCIUM SERPL-MCNC: 8.9 MG/DL — SIGNIFICANT CHANGE UP (ref 8.5–10.1)
CHLORIDE SERPL-SCNC: 104 MMOL/L — SIGNIFICANT CHANGE UP (ref 96–108)
CO2 SERPL-SCNC: 33 MMOL/L — HIGH (ref 22–31)
COLOR SPEC: YELLOW — SIGNIFICANT CHANGE UP
CREAT SERPL-MCNC: 1.36 MG/DL — HIGH (ref 0.5–1.3)
DIFF PNL FLD: NEGATIVE — SIGNIFICANT CHANGE UP
EOSINOPHIL # BLD AUTO: 0.05 K/UL — SIGNIFICANT CHANGE UP (ref 0–0.5)
EOSINOPHIL NFR BLD AUTO: 0.9 % — SIGNIFICANT CHANGE UP (ref 0–6)
GLUCOSE SERPL-MCNC: 95 MG/DL — SIGNIFICANT CHANGE UP (ref 70–99)
GLUCOSE UR QL: NEGATIVE MG/DL — SIGNIFICANT CHANGE UP
HCT VFR BLD CALC: 45 % — SIGNIFICANT CHANGE UP (ref 39–50)
HGB BLD-MCNC: 15.1 G/DL — SIGNIFICANT CHANGE UP (ref 13–17)
IMM GRANULOCYTES NFR BLD AUTO: 0.2 % — SIGNIFICANT CHANGE UP (ref 0–1.5)
INR BLD: 1.03 RATIO — SIGNIFICANT CHANGE UP (ref 0.88–1.16)
KETONES UR-MCNC: NEGATIVE — SIGNIFICANT CHANGE UP
LEUKOCYTE ESTERASE UR-ACNC: NEGATIVE — SIGNIFICANT CHANGE UP
LYMPHOCYTES # BLD AUTO: 1.55 K/UL — SIGNIFICANT CHANGE UP (ref 1–3.3)
LYMPHOCYTES # BLD AUTO: 28.3 % — SIGNIFICANT CHANGE UP (ref 13–44)
MCHC RBC-ENTMCNC: 31.3 PG — SIGNIFICANT CHANGE UP (ref 27–34)
MCHC RBC-ENTMCNC: 33.6 GM/DL — SIGNIFICANT CHANGE UP (ref 32–36)
MCV RBC AUTO: 93.2 FL — SIGNIFICANT CHANGE UP (ref 80–100)
MONOCYTES # BLD AUTO: 0.42 K/UL — SIGNIFICANT CHANGE UP (ref 0–0.9)
MONOCYTES NFR BLD AUTO: 7.7 % — SIGNIFICANT CHANGE UP (ref 2–14)
NEUTROPHILS # BLD AUTO: 3.4 K/UL — SIGNIFICANT CHANGE UP (ref 1.8–7.4)
NEUTROPHILS NFR BLD AUTO: 62 % — SIGNIFICANT CHANGE UP (ref 43–77)
NITRITE UR-MCNC: NEGATIVE — SIGNIFICANT CHANGE UP
PH UR: 7 — SIGNIFICANT CHANGE UP (ref 5–8)
PLATELET # BLD AUTO: 207 K/UL — SIGNIFICANT CHANGE UP (ref 150–400)
POTASSIUM SERPL-MCNC: 4.2 MMOL/L — SIGNIFICANT CHANGE UP (ref 3.5–5.3)
POTASSIUM SERPL-SCNC: 4.2 MMOL/L — SIGNIFICANT CHANGE UP (ref 3.5–5.3)
PROT UR-MCNC: NEGATIVE MG/DL — SIGNIFICANT CHANGE UP
PROTHROM AB SERPL-ACNC: 11.4 SEC — SIGNIFICANT CHANGE UP (ref 10–12.9)
RBC # BLD: 4.83 M/UL — SIGNIFICANT CHANGE UP (ref 4.2–5.8)
RBC # FLD: 12.7 % — SIGNIFICANT CHANGE UP (ref 10.3–14.5)
SODIUM SERPL-SCNC: 140 MMOL/L — SIGNIFICANT CHANGE UP (ref 135–145)
SP GR SPEC: 1 — LOW (ref 1.01–1.02)
UROBILINOGEN FLD QL: NEGATIVE MG/DL — SIGNIFICANT CHANGE UP
WBC # BLD: 5.48 K/UL — SIGNIFICANT CHANGE UP (ref 3.8–10.5)
WBC # FLD AUTO: 5.48 K/UL — SIGNIFICANT CHANGE UP (ref 3.8–10.5)

## 2019-08-12 PROCEDURE — 80048 BASIC METABOLIC PNL TOTAL CA: CPT

## 2019-08-12 PROCEDURE — 86901 BLOOD TYPING SEROLOGIC RH(D): CPT

## 2019-08-12 PROCEDURE — G0463: CPT | Mod: 25

## 2019-08-12 PROCEDURE — 71046 X-RAY EXAM CHEST 2 VIEWS: CPT | Mod: 26

## 2019-08-12 PROCEDURE — 85025 COMPLETE CBC W/AUTO DIFF WBC: CPT

## 2019-08-12 PROCEDURE — 86850 RBC ANTIBODY SCREEN: CPT

## 2019-08-12 PROCEDURE — 85610 PROTHROMBIN TIME: CPT

## 2019-08-12 PROCEDURE — 86900 BLOOD TYPING SEROLOGIC ABO: CPT

## 2019-08-12 PROCEDURE — 85730 THROMBOPLASTIN TIME PARTIAL: CPT

## 2019-08-12 PROCEDURE — 87640 STAPH A DNA AMP PROBE: CPT

## 2019-08-12 PROCEDURE — 87641 MR-STAPH DNA AMP PROBE: CPT

## 2019-08-12 PROCEDURE — 36415 COLL VENOUS BLD VENIPUNCTURE: CPT

## 2019-08-12 PROCEDURE — 81003 URINALYSIS AUTO W/O SCOPE: CPT

## 2019-08-12 RX ORDER — FAMOTIDINE 10 MG/ML
0 INJECTION INTRAVENOUS
Qty: 0 | Refills: 0 | DISCHARGE

## 2019-08-12 NOTE — H&P PST ADULT - HISTORY OF PRESENT ILLNESS
63 year old male with acute spondylolisthesis  c/o back pain, numbness tingling and sciatica bilateral legs Pt has had epidural injection, facet block and nerve ablation with temporary relief of pain; denies weakness BLE  he is taking prednisone which helps with back pain he presents to PST for planned minimally invasion transforaminal  lumbar fusion

## 2019-08-12 NOTE — H&P PST ADULT - ASSESSMENT
63 year old male presents to PST for planned minimally invasion transforaminal  lumbar fusion     Plan:  1. PST instructions given ; NPO post midnight   2. Pt instructed to take following meds: prednisone will discuss with Dr Medrano if need refill   3. EZ wash instructions given & mupirocin instructions given  4. Medical Optimization  with Dr Odonnell   5. Stop NSAIDS ( Aspirin Alev Motrin Mobic Diclofenac), herbal supplements , MVI , Vitamin fish oil 7 days prior to surgery  unless directed by surgeon or cardiologist;   6. Labs EKG CXR as per surgeon request     CAPRINI SCORE [CLOT]    AGE RELATED RISK FACTORS                                                       MOBILITY RELATED FACTORS  [ ] Age 41-60 years                                            (1 Point)                  [ ] Bed rest                                                        (1 Point)  [x ] Age: 61-74 years                                           (2 Points)                 [ ] Plaster cast                                                   (2 Points)  [ ] Age= 75 years                                              (3 Points)                 [ ] Bed bound for more than 72 hours                 (2 Points)    DISEASE RELATED RISK FACTORS                                               GENDER SPECIFIC FACTORS  [ ] Edema in the lower extremities                       (1 Point)                  [ ] Pregnancy                                                     (1 Point)  [ ] Varicose veins                                               (1 Point)                  [ ] Post-partum < 6 weeks                                   (1 Point)             [ x] BMI > 25 Kg/m2                                            (1 Point)                  [ ] Hormonal therapy  or oral contraception          (1 Point)                 [ ] Sepsis (in the previous month)                        (1 Point)                  [ ] History of pregnancy complications                 (1 point)  [ ] Pneumonia or serious lung disease                                               [ ] Unexplained or recurrent                     (1 Point)           (in the previous month)                               (1 Point)  [ ] Abnormal pulmonary function test                     (1 Point)                 SURGERY RELATED RISK FACTORS  [ ] Acute myocardial infarction                              (1 Point)                 [ ]  Section                                             (1 Point)  [ ] Congestive heart failure (in the previous month)  (1 Point)               [ ] Minor surgery                                                  (1 Point)   [ ] Inflammatory bowel disease                             (1 Point)                 [ ] Arthroscopic surgery                                        (2 Points)  [ ] Central venous access                                      (2 Points)                [x ] General surgery lasting more than 45 minutes   (2 Points)       [ ] Stroke (in the previous month)                          (5 Points)               [ ] Elective arthroplasty                                         (5 Points)            ( ) past and present malignancy                             ( 2 points)                                                                                                                                    HEMATOLOGY RELATED FACTORS                                                 TRAUMA RELATED RISK FACTORS  [x ] Prior episodes of VTE                                     (3 Points)                 [ ] Fracture of the hip, pelvis, or leg                       (5 Points)  [ ] Positive family history for VTE                         (3 Points)                 [ ] Acute spinal cord injury (in the previous month)  (5 Points)  [ ] Prothrombin 06448 A                                     (3 Points)                 [ ] Paralysis  (less than 1 month)                             (5 Points)  [ ] Factor V Leiden                                             (3 Points)                  [ ] Multiple Trauma within 1 month                        (5 Points)  [ ] Lupus anticoagulants                                     (3 Points)                                                           [ ] Anticardiolipin antibodies                               (3 Points)                                                       [ ] High homocysteine in the blood                      (3 Points)                                             [ ] Other congenital or acquired thrombophilia      (3 Points)                                                [ ] Heparin induced thrombocytopenia                  (3 Points)                                          Total Score [   8       ]

## 2019-08-12 NOTE — H&P PST ADULT - NSICDXPASTMEDICALHX_GEN_ALL_CORE_FT
PAST MEDICAL HISTORY:  BPH (benign prostatic hyperplasia)     Degenerative lumbar spinal stenosis     DVT (deep venous thrombosis) 5/2017    One day post-op for right Knee Arthroscopy. Currently on xarelto    H/O prostatitis     HLD (hyperlipidemia)     Paroxysmal a-fib '2010 and '2012    Two episodes    Pilonidal cyst ' 87    Rotator cuff tear '95 and '96   Left    Sciatica both legs    Valvular heart disease PAST MEDICAL HISTORY:  BPH (benign prostatic hyperplasia)     Degenerative lumbar spinal stenosis     DVT (deep venous thrombosis) 5/2017    One day post-op for right Knee Arthroscopy. Currently on Xarelto    H/O prostatitis     HLD (hyperlipidemia)     Paroxysmal a-fib '2010 and '2012    Two episodes    Pilonidal cyst ' 87    Rotator cuff tear '95 and '96   Left    Sciatica both legs    Valvular heart disease

## 2019-08-12 NOTE — H&P PST ADULT - NSICDXFAMILYHX_GEN_ALL_CORE_FT
FAMILY HISTORY:  Father  Still living? No  Melanoma, Age at diagnosis: Age Unknown    Sibling  Still living? No  Family history of stroke, Age at diagnosis: Age Unknown

## 2019-08-12 NOTE — H&P PST ADULT - NSICDXPASTSURGICALHX_GEN_ALL_CORE_FT
PAST SURGICAL HISTORY:  History of excision of pilonidal cyst ' 87    History of hydrocelectomy     S/P right knee arthroscopy 5/2017    S/P rotator cuff repair ' 95 and '96    Left; right 2017    Status post colonoscopy ' 2015   negative    Status post laminectomy with spinal fusion ' 1991:  Cervical

## 2019-08-13 DIAGNOSIS — M43.17 SPONDYLOLISTHESIS, LUMBOSACRAL REGION: ICD-10-CM

## 2019-08-13 LAB
MRSA PCR RESULT.: SIGNIFICANT CHANGE UP
S AUREUS DNA NOSE QL NAA+PROBE: SIGNIFICANT CHANGE UP

## 2019-08-19 RX ORDER — SODIUM CHLORIDE 9 MG/ML
1000 INJECTION, SOLUTION INTRAVENOUS
Refills: 0 | Status: DISCONTINUED | OUTPATIENT
Start: 2019-08-20 | End: 2019-08-20

## 2019-08-20 ENCOUNTER — INPATIENT (INPATIENT)
Facility: HOSPITAL | Age: 64
LOS: 2 days | Discharge: HOME | DRG: 460 | End: 2019-08-23
Attending: SPECIALIST | Admitting: SPECIALIST
Payer: MEDICARE

## 2019-08-20 ENCOUNTER — RESULT REVIEW (OUTPATIENT)
Age: 64
End: 2019-08-20

## 2019-08-20 ENCOUNTER — APPOINTMENT (OUTPATIENT)
Age: 64
End: 2019-08-20
Payer: MEDICARE

## 2019-08-20 VITALS
HEART RATE: 60 BPM | HEIGHT: 68 IN | RESPIRATION RATE: 16 BRPM | SYSTOLIC BLOOD PRESSURE: 142 MMHG | WEIGHT: 169.98 LBS | DIASTOLIC BLOOD PRESSURE: 70 MMHG | TEMPERATURE: 98 F

## 2019-08-20 DIAGNOSIS — M54.5 LOW BACK PAIN: ICD-10-CM

## 2019-08-20 DIAGNOSIS — Z98.890 OTHER SPECIFIED POSTPROCEDURAL STATES: Chronic | ICD-10-CM

## 2019-08-20 DIAGNOSIS — M43.17 SPONDYLOLISTHESIS, LUMBOSACRAL REGION: ICD-10-CM

## 2019-08-20 DIAGNOSIS — Z98.1 ARTHRODESIS STATUS: Chronic | ICD-10-CM

## 2019-08-20 PROCEDURE — C1713: CPT

## 2019-08-20 PROCEDURE — 76000 FLUOROSCOPY <1 HR PHYS/QHP: CPT

## 2019-08-20 PROCEDURE — 80048 BASIC METABOLIC PNL TOTAL CA: CPT

## 2019-08-20 PROCEDURE — C1889: CPT

## 2019-08-20 PROCEDURE — 22630 ARTHRD PST TQ 1NTRSPC LUM: CPT | Mod: AS

## 2019-08-20 PROCEDURE — 22853 INSJ BIOMECHANICAL DEVICE: CPT | Mod: AS

## 2019-08-20 PROCEDURE — 72100 X-RAY EXAM L-S SPINE 2/3 VWS: CPT

## 2019-08-20 PROCEDURE — 61783 SCAN PROC SPINAL: CPT | Mod: AS

## 2019-08-20 PROCEDURE — 97116 GAIT TRAINING THERAPY: CPT | Mod: GP

## 2019-08-20 PROCEDURE — 22853 INSJ BIOMECHANICAL DEVICE: CPT

## 2019-08-20 PROCEDURE — 61783 SCAN PROC SPINAL: CPT

## 2019-08-20 PROCEDURE — 85025 COMPLETE CBC W/AUTO DIFF WBC: CPT

## 2019-08-20 PROCEDURE — 88304 TISSUE EXAM BY PATHOLOGIST: CPT

## 2019-08-20 PROCEDURE — 97530 THERAPEUTIC ACTIVITIES: CPT | Mod: GP

## 2019-08-20 PROCEDURE — 99024 POSTOP FOLLOW-UP VISIT: CPT

## 2019-08-20 PROCEDURE — 36415 COLL VENOUS BLD VENIPUNCTURE: CPT

## 2019-08-20 PROCEDURE — 22840 INSERT SPINE FIXATION DEVICE: CPT

## 2019-08-20 PROCEDURE — 72100 X-RAY EXAM L-S SPINE 2/3 VWS: CPT | Mod: 26

## 2019-08-20 PROCEDURE — 22630 ARTHRD PST TQ 1NTRSPC LUM: CPT

## 2019-08-20 PROCEDURE — 88304 TISSUE EXAM BY PATHOLOGIST: CPT | Mod: 26

## 2019-08-20 PROCEDURE — 97162 PT EVAL MOD COMPLEX 30 MIN: CPT | Mod: GP

## 2019-08-20 PROCEDURE — 22840 INSERT SPINE FIXATION DEVICE: CPT | Mod: AS

## 2019-08-20 RX ORDER — HYDROMORPHONE HYDROCHLORIDE 2 MG/ML
2 INJECTION INTRAMUSCULAR; INTRAVENOUS; SUBCUTANEOUS
Refills: 0 | Status: DISCONTINUED | OUTPATIENT
Start: 2019-08-20 | End: 2019-08-23

## 2019-08-20 RX ORDER — CYCLOBENZAPRINE HYDROCHLORIDE 10 MG/1
10 TABLET, FILM COATED ORAL THREE TIMES A DAY
Refills: 0 | Status: DISCONTINUED | OUTPATIENT
Start: 2019-08-20 | End: 2019-08-22

## 2019-08-20 RX ORDER — ACETAMINOPHEN 500 MG
1000 TABLET ORAL ONCE
Refills: 0 | Status: DISCONTINUED | OUTPATIENT
Start: 2019-08-20 | End: 2019-08-23

## 2019-08-20 RX ORDER — TOBRAMYCIN 0.3 %
1 DROPS OPHTHALMIC (EYE) EVERY 6 HOURS
Refills: 0 | Status: COMPLETED | OUTPATIENT
Start: 2019-08-20 | End: 2019-08-21

## 2019-08-20 RX ORDER — CEFAZOLIN SODIUM 1 G
1000 VIAL (EA) INJECTION EVERY 8 HOURS
Refills: 0 | Status: COMPLETED | OUTPATIENT
Start: 2019-08-20 | End: 2019-08-21

## 2019-08-20 RX ORDER — TAMSULOSIN HYDROCHLORIDE 0.4 MG/1
0.4 CAPSULE ORAL AT BEDTIME
Refills: 0 | Status: DISCONTINUED | OUTPATIENT
Start: 2019-08-20 | End: 2019-08-23

## 2019-08-20 RX ORDER — ONDANSETRON 8 MG/1
4 TABLET, FILM COATED ORAL EVERY 6 HOURS
Refills: 0 | Status: DISCONTINUED | OUTPATIENT
Start: 2019-08-20 | End: 2019-08-23

## 2019-08-20 RX ORDER — DOCUSATE SODIUM 100 MG
100 CAPSULE ORAL
Refills: 0 | Status: DISCONTINUED | OUTPATIENT
Start: 2019-08-20 | End: 2019-08-23

## 2019-08-20 RX ORDER — CEFAZOLIN SODIUM 1 G
1000 VIAL (EA) INJECTION EVERY 8 HOURS
Refills: 0 | Status: DISCONTINUED | OUTPATIENT
Start: 2019-08-20 | End: 2019-08-20

## 2019-08-20 RX ORDER — HYDROMORPHONE HYDROCHLORIDE 2 MG/ML
0.5 INJECTION INTRAMUSCULAR; INTRAVENOUS; SUBCUTANEOUS
Refills: 0 | Status: DISCONTINUED | OUTPATIENT
Start: 2019-08-20 | End: 2019-08-20

## 2019-08-20 RX ORDER — OXYCODONE HYDROCHLORIDE 5 MG/1
10 TABLET ORAL ONCE
Refills: 0 | Status: DISCONTINUED | OUTPATIENT
Start: 2019-08-20 | End: 2019-08-20

## 2019-08-20 RX ORDER — ONDANSETRON 8 MG/1
4 TABLET, FILM COATED ORAL ONCE
Refills: 0 | Status: DISCONTINUED | OUTPATIENT
Start: 2019-08-20 | End: 2019-08-20

## 2019-08-20 RX ORDER — SODIUM CHLORIDE 9 MG/ML
1000 INJECTION INTRAMUSCULAR; INTRAVENOUS; SUBCUTANEOUS
Refills: 0 | Status: DISCONTINUED | OUTPATIENT
Start: 2019-08-20 | End: 2019-08-22

## 2019-08-20 RX ORDER — OXYCODONE HYDROCHLORIDE 5 MG/1
5 TABLET ORAL EVERY 4 HOURS
Refills: 0 | Status: DISCONTINUED | OUTPATIENT
Start: 2019-08-20 | End: 2019-08-22

## 2019-08-20 RX ORDER — SENNA PLUS 8.6 MG/1
2 TABLET ORAL AT BEDTIME
Refills: 0 | Status: DISCONTINUED | OUTPATIENT
Start: 2019-08-20 | End: 2019-08-23

## 2019-08-20 RX ORDER — OXYCODONE HYDROCHLORIDE 5 MG/1
10 TABLET ORAL EVERY 6 HOURS
Refills: 0 | Status: DISCONTINUED | OUTPATIENT
Start: 2019-08-20 | End: 2019-08-23

## 2019-08-20 RX ADMIN — Medication 1 DROP(S): at 17:18

## 2019-08-20 RX ADMIN — HYDROMORPHONE HYDROCHLORIDE 2 MILLIGRAM(S): 2 INJECTION INTRAMUSCULAR; INTRAVENOUS; SUBCUTANEOUS at 17:11

## 2019-08-20 RX ADMIN — HYDROMORPHONE HYDROCHLORIDE 2 MILLIGRAM(S): 2 INJECTION INTRAMUSCULAR; INTRAVENOUS; SUBCUTANEOUS at 17:26

## 2019-08-20 RX ADMIN — HYDROMORPHONE HYDROCHLORIDE 0.5 MILLIGRAM(S): 2 INJECTION INTRAMUSCULAR; INTRAVENOUS; SUBCUTANEOUS at 14:55

## 2019-08-20 RX ADMIN — HYDROMORPHONE HYDROCHLORIDE 0.5 MILLIGRAM(S): 2 INJECTION INTRAMUSCULAR; INTRAVENOUS; SUBCUTANEOUS at 14:20

## 2019-08-20 RX ADMIN — Medication 1000 MILLIGRAM(S): at 17:54

## 2019-08-20 RX ADMIN — Medication 1 DROP(S): at 22:21

## 2019-08-20 RX ADMIN — TAMSULOSIN HYDROCHLORIDE 0.4 MILLIGRAM(S): 0.4 CAPSULE ORAL at 21:55

## 2019-08-20 RX ADMIN — HYDROMORPHONE HYDROCHLORIDE 0.5 MILLIGRAM(S): 2 INJECTION INTRAMUSCULAR; INTRAVENOUS; SUBCUTANEOUS at 14:40

## 2019-08-20 RX ADMIN — HYDROMORPHONE HYDROCHLORIDE 0.5 MILLIGRAM(S): 2 INJECTION INTRAMUSCULAR; INTRAVENOUS; SUBCUTANEOUS at 14:10

## 2019-08-20 RX ADMIN — HYDROMORPHONE HYDROCHLORIDE 2 MILLIGRAM(S): 2 INJECTION INTRAMUSCULAR; INTRAVENOUS; SUBCUTANEOUS at 20:30

## 2019-08-20 RX ADMIN — Medication 100 MILLIGRAM(S): at 21:50

## 2019-08-20 RX ADMIN — HYDROMORPHONE HYDROCHLORIDE 0.5 MILLIGRAM(S): 2 INJECTION INTRAMUSCULAR; INTRAVENOUS; SUBCUTANEOUS at 14:35

## 2019-08-20 RX ADMIN — HYDROMORPHONE HYDROCHLORIDE 2 MILLIGRAM(S): 2 INJECTION INTRAMUSCULAR; INTRAVENOUS; SUBCUTANEOUS at 20:16

## 2019-08-20 RX ADMIN — CYCLOBENZAPRINE HYDROCHLORIDE 10 MILLIGRAM(S): 10 TABLET, FILM COATED ORAL at 21:50

## 2019-08-20 NOTE — BRIEF OPERATIVE NOTE - NSICDXBRIEFPREOP_GEN_ALL_CORE_FT
PRE-OP DIAGNOSIS:  Lumbar radiculopathy 20-Aug-2019 13:25:15  Reg Medrano  Spondylosis of lumbar spine 20-Aug-2019 13:25:02  Reg Medrano

## 2019-08-20 NOTE — CHART NOTE - NSCHARTNOTEFT_GEN_A_CORE
Patient has an estimated Caprini Score of greater than 5.  However, the patient's unique clinical situation will be addressed in an individual manner to determine appropriate anticoagulation treatment, if any/

## 2019-08-20 NOTE — BRIEF OPERATIVE NOTE - OPERATION/FINDINGS
very overgrown facet. good facet decompression . MIS with O-arm and neuromonitoring - L5/S1. interbody cage placed from left side - neurophysiology stable throughout,

## 2019-08-20 NOTE — BRIEF OPERATIVE NOTE - NSICDXBRIEFPROCEDURE_GEN_ALL_CORE_FT
PROCEDURES:  Minimally invasive transforaminal lumbar interbody fusion (TLIF) at single level 20-Aug-2019 13:24:05  Reg Medrano

## 2019-08-20 NOTE — BRIEF OPERATIVE NOTE - NSICDXBRIEFPOSTOP_GEN_ALL_CORE_FT
POST-OP DIAGNOSIS:  Lumbar radiculopathy 20-Aug-2019 13:25:49  Reg Medrano  Lumbar spondylosis 20-Aug-2019 13:25:33  Reg Medrano

## 2019-08-21 LAB
ANION GAP SERPL CALC-SCNC: 6 MMOL/L — SIGNIFICANT CHANGE UP (ref 5–17)
BASOPHILS # BLD AUTO: 0.02 K/UL — SIGNIFICANT CHANGE UP (ref 0–0.2)
BASOPHILS NFR BLD AUTO: 0.1 % — SIGNIFICANT CHANGE UP (ref 0–2)
BUN SERPL-MCNC: 14 MG/DL — SIGNIFICANT CHANGE UP (ref 7–23)
CALCIUM SERPL-MCNC: 9 MG/DL — SIGNIFICANT CHANGE UP (ref 8.5–10.1)
CHLORIDE SERPL-SCNC: 104 MMOL/L — SIGNIFICANT CHANGE UP (ref 96–108)
CO2 SERPL-SCNC: 31 MMOL/L — SIGNIFICANT CHANGE UP (ref 22–31)
CREAT SERPL-MCNC: 1.13 MG/DL — SIGNIFICANT CHANGE UP (ref 0.5–1.3)
EOSINOPHIL # BLD AUTO: 0.01 K/UL — SIGNIFICANT CHANGE UP (ref 0–0.5)
EOSINOPHIL NFR BLD AUTO: 0.1 % — SIGNIFICANT CHANGE UP (ref 0–6)
GLUCOSE SERPL-MCNC: 104 MG/DL — HIGH (ref 70–99)
HCT VFR BLD CALC: 38.8 % — LOW (ref 39–50)
HGB BLD-MCNC: 12.7 G/DL — LOW (ref 13–17)
IMM GRANULOCYTES NFR BLD AUTO: 0.6 % — SIGNIFICANT CHANGE UP (ref 0–1.5)
LYMPHOCYTES # BLD AUTO: 0.9 K/UL — LOW (ref 1–3.3)
LYMPHOCYTES # BLD AUTO: 6.6 % — LOW (ref 13–44)
MCHC RBC-ENTMCNC: 31 PG — SIGNIFICANT CHANGE UP (ref 27–34)
MCHC RBC-ENTMCNC: 32.7 GM/DL — SIGNIFICANT CHANGE UP (ref 32–36)
MCV RBC AUTO: 94.6 FL — SIGNIFICANT CHANGE UP (ref 80–100)
MONOCYTES # BLD AUTO: 0.96 K/UL — HIGH (ref 0–0.9)
MONOCYTES NFR BLD AUTO: 7 % — SIGNIFICANT CHANGE UP (ref 2–14)
NEUTROPHILS # BLD AUTO: 11.71 K/UL — HIGH (ref 1.8–7.4)
NEUTROPHILS NFR BLD AUTO: 85.6 % — HIGH (ref 43–77)
PLATELET # BLD AUTO: 193 K/UL — SIGNIFICANT CHANGE UP (ref 150–400)
POTASSIUM SERPL-MCNC: 4.6 MMOL/L — SIGNIFICANT CHANGE UP (ref 3.5–5.3)
POTASSIUM SERPL-SCNC: 4.6 MMOL/L — SIGNIFICANT CHANGE UP (ref 3.5–5.3)
RBC # BLD: 4.1 M/UL — LOW (ref 4.2–5.8)
RBC # FLD: 13.2 % — SIGNIFICANT CHANGE UP (ref 10.3–14.5)
SODIUM SERPL-SCNC: 141 MMOL/L — SIGNIFICANT CHANGE UP (ref 135–145)
WBC # BLD: 13.68 K/UL — HIGH (ref 3.8–10.5)
WBC # FLD AUTO: 13.68 K/UL — HIGH (ref 3.8–10.5)

## 2019-08-21 PROCEDURE — 99024 POSTOP FOLLOW-UP VISIT: CPT

## 2019-08-21 RX ORDER — HEPARIN SODIUM 5000 [USP'U]/ML
5000 INJECTION INTRAVENOUS; SUBCUTANEOUS EVERY 12 HOURS
Refills: 0 | Status: DISCONTINUED | OUTPATIENT
Start: 2019-08-21 | End: 2019-08-23

## 2019-08-21 RX ADMIN — CYCLOBENZAPRINE HYDROCHLORIDE 10 MILLIGRAM(S): 10 TABLET, FILM COATED ORAL at 16:48

## 2019-08-21 RX ADMIN — CYCLOBENZAPRINE HYDROCHLORIDE 10 MILLIGRAM(S): 10 TABLET, FILM COATED ORAL at 05:56

## 2019-08-21 RX ADMIN — Medication 1000 MILLIGRAM(S): at 00:54

## 2019-08-21 RX ADMIN — Medication 100 MILLIGRAM(S): at 05:56

## 2019-08-21 RX ADMIN — OXYCODONE HYDROCHLORIDE 10 MILLIGRAM(S): 5 TABLET ORAL at 16:00

## 2019-08-21 RX ADMIN — HYDROMORPHONE HYDROCHLORIDE 2 MILLIGRAM(S): 2 INJECTION INTRAMUSCULAR; INTRAVENOUS; SUBCUTANEOUS at 05:57

## 2019-08-21 RX ADMIN — OXYCODONE HYDROCHLORIDE 10 MILLIGRAM(S): 5 TABLET ORAL at 11:30

## 2019-08-21 RX ADMIN — HYDROMORPHONE HYDROCHLORIDE 2 MILLIGRAM(S): 2 INJECTION INTRAMUSCULAR; INTRAVENOUS; SUBCUTANEOUS at 23:08

## 2019-08-21 RX ADMIN — TAMSULOSIN HYDROCHLORIDE 0.4 MILLIGRAM(S): 0.4 CAPSULE ORAL at 21:45

## 2019-08-21 RX ADMIN — CYCLOBENZAPRINE HYDROCHLORIDE 10 MILLIGRAM(S): 10 TABLET, FILM COATED ORAL at 23:08

## 2019-08-21 RX ADMIN — HYDROMORPHONE HYDROCHLORIDE 2 MILLIGRAM(S): 2 INJECTION INTRAMUSCULAR; INTRAVENOUS; SUBCUTANEOUS at 17:54

## 2019-08-21 RX ADMIN — OXYCODONE HYDROCHLORIDE 10 MILLIGRAM(S): 5 TABLET ORAL at 15:09

## 2019-08-21 RX ADMIN — HYDROMORPHONE HYDROCHLORIDE 2 MILLIGRAM(S): 2 INJECTION INTRAMUSCULAR; INTRAVENOUS; SUBCUTANEOUS at 18:13

## 2019-08-21 RX ADMIN — Medication 1 TABLET(S): at 10:39

## 2019-08-21 RX ADMIN — OXYCODONE HYDROCHLORIDE 10 MILLIGRAM(S): 5 TABLET ORAL at 10:40

## 2019-08-21 RX ADMIN — HYDROMORPHONE HYDROCHLORIDE 2 MILLIGRAM(S): 2 INJECTION INTRAMUSCULAR; INTRAVENOUS; SUBCUTANEOUS at 01:09

## 2019-08-21 RX ADMIN — HYDROMORPHONE HYDROCHLORIDE 2 MILLIGRAM(S): 2 INJECTION INTRAMUSCULAR; INTRAVENOUS; SUBCUTANEOUS at 00:54

## 2019-08-21 RX ADMIN — HYDROMORPHONE HYDROCHLORIDE 2 MILLIGRAM(S): 2 INJECTION INTRAMUSCULAR; INTRAVENOUS; SUBCUTANEOUS at 23:20

## 2019-08-21 RX ADMIN — Medication 1 DROP(S): at 10:39

## 2019-08-21 RX ADMIN — Medication 100 MILLIGRAM(S): at 17:50

## 2019-08-21 RX ADMIN — Medication 1 DROP(S): at 05:56

## 2019-08-21 RX ADMIN — HYDROMORPHONE HYDROCHLORIDE 2 MILLIGRAM(S): 2 INJECTION INTRAMUSCULAR; INTRAVENOUS; SUBCUTANEOUS at 06:10

## 2019-08-21 RX ADMIN — HEPARIN SODIUM 5000 UNIT(S): 5000 INJECTION INTRAVENOUS; SUBCUTANEOUS at 17:50

## 2019-08-21 NOTE — PHYSICAL THERAPY INITIAL EVALUATION ADULT - GENERAL OBSERVATIONS, REHAB EVAL
The pt was pleasant and cooperative and eager to get OOB with PT but it was noted this AM that Dr. Medrano wants the patient to ambulate with a LSO brace, discussed case with Spine PA and RN, plan to keep the pt in bed until the brace arrives. The pt agreed to PT evaluation in bed.

## 2019-08-21 NOTE — PHYSICAL THERAPY INITIAL EVALUATION ADULT - MODALITIES TREATMENT COMMENTS
The pt demonstrated good overall activity tolerance, responding well to therex review, and bed mobility tx, + spinal precautions reviewed t/o eval. The pt was left supine, adjusted for comfort in bed. RN aware. CB, tray and phone in place. The pt was in NAD at end of tx.  Plan for OOB assessment once LSO brace arrives,

## 2019-08-21 NOTE — PHYSICAL THERAPY INITIAL EVALUATION ADULT - PERTINENT HX OF CURRENT PROBLEM, REHAB EVAL
as per HPI: 63 year old male with acute spondylolisthesis  c/o back pain, numbness tingling and sciatica bilateral legs Pt has had epidural injection, facet block and nerve ablation with temporary relief of pain; denies weakness BLE  he is taking prednisone which helps with back pain he presents to PST for planned minimally invasion transforaminal  lumbar fusion

## 2019-08-21 NOTE — PHYSICAL THERAPY INITIAL EVALUATION ADULT - ADDITIONAL COMMENTS
6 steps to enter the home and then a full flight of steps inside but not necessary to negotiate the steps inside of the home.

## 2019-08-22 PROCEDURE — 99024 POSTOP FOLLOW-UP VISIT: CPT

## 2019-08-22 RX ORDER — TRAMADOL HYDROCHLORIDE 50 MG/1
50 TABLET ORAL EVERY 4 HOURS
Refills: 0 | Status: DISCONTINUED | OUTPATIENT
Start: 2019-08-22 | End: 2019-08-23

## 2019-08-22 RX ORDER — ACETAMINOPHEN 500 MG
1000 TABLET ORAL ONCE
Refills: 0 | Status: COMPLETED | OUTPATIENT
Start: 2019-08-22 | End: 2019-08-22

## 2019-08-22 RX ORDER — CYCLOBENZAPRINE HYDROCHLORIDE 10 MG/1
10 TABLET, FILM COATED ORAL THREE TIMES A DAY
Refills: 0 | Status: DISCONTINUED | OUTPATIENT
Start: 2019-08-22 | End: 2019-08-23

## 2019-08-22 RX ORDER — ACETAMINOPHEN 500 MG
650 TABLET ORAL EVERY 6 HOURS
Refills: 0 | Status: DISCONTINUED | OUTPATIENT
Start: 2019-08-22 | End: 2019-08-23

## 2019-08-22 RX ADMIN — HEPARIN SODIUM 5000 UNIT(S): 5000 INJECTION INTRAVENOUS; SUBCUTANEOUS at 18:47

## 2019-08-22 RX ADMIN — Medication 100 MILLIGRAM(S): at 18:47

## 2019-08-22 RX ADMIN — CYCLOBENZAPRINE HYDROCHLORIDE 10 MILLIGRAM(S): 10 TABLET, FILM COATED ORAL at 12:49

## 2019-08-22 RX ADMIN — Medication 100 MILLIGRAM(S): at 06:02

## 2019-08-22 RX ADMIN — OXYCODONE HYDROCHLORIDE 10 MILLIGRAM(S): 5 TABLET ORAL at 03:26

## 2019-08-22 RX ADMIN — Medication 1000 MILLIGRAM(S): at 22:50

## 2019-08-22 RX ADMIN — TRAMADOL HYDROCHLORIDE 50 MILLIGRAM(S): 50 TABLET ORAL at 10:42

## 2019-08-22 RX ADMIN — CYCLOBENZAPRINE HYDROCHLORIDE 10 MILLIGRAM(S): 10 TABLET, FILM COATED ORAL at 21:15

## 2019-08-22 RX ADMIN — SENNA PLUS 2 TABLET(S): 8.6 TABLET ORAL at 21:15

## 2019-08-22 RX ADMIN — OXYCODONE HYDROCHLORIDE 10 MILLIGRAM(S): 5 TABLET ORAL at 03:56

## 2019-08-22 RX ADMIN — Medication 1000 MILLIGRAM(S): at 14:50

## 2019-08-22 RX ADMIN — Medication 1 TABLET(S): at 12:49

## 2019-08-22 RX ADMIN — TRAMADOL HYDROCHLORIDE 50 MILLIGRAM(S): 50 TABLET ORAL at 11:38

## 2019-08-22 RX ADMIN — HEPARIN SODIUM 5000 UNIT(S): 5000 INJECTION INTRAVENOUS; SUBCUTANEOUS at 06:02

## 2019-08-22 RX ADMIN — Medication 400 MILLIGRAM(S): at 14:35

## 2019-08-22 RX ADMIN — TAMSULOSIN HYDROCHLORIDE 0.4 MILLIGRAM(S): 0.4 CAPSULE ORAL at 21:15

## 2019-08-22 RX ADMIN — CYCLOBENZAPRINE HYDROCHLORIDE 10 MILLIGRAM(S): 10 TABLET, FILM COATED ORAL at 03:25

## 2019-08-22 RX ADMIN — Medication 400 MILLIGRAM(S): at 22:35

## 2019-08-22 NOTE — PROGRESS NOTE ADULT - ASSESSMENT
63 year old male s/p Minimally invasive L5/S1 TLIF    neuro stable  dc gibbons in am  ambulate in am  pain medication as needed  stool softener  scd for DVT prophylaxis.   SQH in am.   discussed with Dr. Medrano.
64 yo male PMH DVT on Xarelto (held), BPH, HLD, presented with c/o back pain, numbness / tingling, and sciatica in bilateral legs s/p L5/S1 minimally invasive TLIF    POD#1    - LSO brace ordered  - OOB/PT when brace comes  - Guy out, f/u void  - Advance diet- Pain mgmt  - SQH / venodynes    Discussed with Dr Medrano
64 yo male PMH DVT on Xarelto (held), BPH, HLD, presented with c/o back pain, numbness / tingling, and sciatica in bilateral legs s/p L5/S1 minimally invasive TLIF    POD#2 s/p L5/S1 minimally invasive TLIF    Plan:  - LSO brace at bedside to be worn when OOB  - OOB/PT  - Encouraged mobilization patient expressed understanding  - Pain Control Flexeril around the clock, Oxy prn  - SQH / venodynes for DVT proph  - Bowel regimen for GI proph  - D/c home likely tomorrow    D/w Dr Medrano

## 2019-08-22 NOTE — PROGRESS NOTE ADULT - SUBJECTIVE AND OBJECTIVE BOX
HPI: 63 year old male s/p L5-S1 minimally invasive TLIF doing well post operatively.     Vital Signs Last 24 Hrs  T(C): 36.4 (20 Aug 2019 21:48), Max: 36.7 (20 Aug 2019 13:50)  T(F): 97.5 (20 Aug 2019 21:48), Max: 98 (20 Aug 2019 13:50)  HR: 60 (20 Aug 2019 21:48) (60 - 92)  BP: 106/61 (20 Aug 2019 21:48) (102/55 - 142/70)  BP(mean): --  RR: 16 (20 Aug 2019 21:48) (12 - 16)  SpO2: 100% (20 Aug 2019 21:48) (98% - 100%)    MEDICATIONS  (STANDING):  ceFAZolin  Injectable. 1000 milliGRAM(s) IV Push every 8 hours  docusate sodium 100 milliGRAM(s) Oral two times a day  multivitamin 1 Tablet(s) Oral daily  senna 2 Tablet(s) Oral at bedtime  sodium chloride 0.9%. 1000 milliLiter(s) (75 mL/Hr) IV Continuous <Continuous>  tamsulosin 0.4 milliGRAM(s) Oral at bedtime  tobramycin 0.3% Ophthalmic Solution 1 Drop(s) Both EYES every 6 hours    MEDICATIONS  (PRN):  acetaminophen  IVPB .. 1000 milliGRAM(s) IV Intermittent once PRN Mild Pain (1 - 3)  cyclobenzaprine 10 milliGRAM(s) Oral three times a day PRN Muscle Spasm  HYDROmorphone  Injectable 2 milliGRAM(s) IV Push every 3 hours PRN 10/10 breakthrough pain  ondansetron Injectable 4 milliGRAM(s) IV Push every 6 hours PRN Nausea and/or Vomiting  oxyCODONE    IR 5 milliGRAM(s) Oral every 4 hours PRN Moderate Pain (4 - 6)  oxyCODONE    IR 10 milliGRAM(s) Oral every 6 hours PRN Severe Pain (7 - 10)      PHYSICAL EXAM:    Constitutional: awake and alert.  HEENT: PERRLA, EOMI,   Neck: Supple.  Respiratory: Breath sounds are clear bilaterally  Cardiovascular: S1 and S2, regular  Gastrointestinal: soft, nontender  Extremities:  no edema  Musculoskeletal: no joint swelling/tenderness, no abnormal movements  Skin: No rashes    Neurological exam:  HF: A x O x 3. Appropriately interactive, normal affect. Speech fluent, No Aphasia or paraphasic errors. Naming /repetition intact   CN: JELLY, EOMI, VFF, facial sensation normal, no NLFD, tongue midline, Palate moves equally, SCM equal bilaterally  Motor: No pronator drift, Strength 5/5 in all 4 ext, normal bulk and tone, no tremor, rigidity or bradykinesia.    Sens: Intact to light touch / PP/ VS/ JS. Mild paresthesia to right thigh but grossly intact to light touch.   Gait/Balance: Normal/Cannot test   Dressing C/d/i
HPI:  62 yo male PMH DVT on Xarelto (held), BPH, HLD, presented with c/o back pain, numbness / tingling, and sciatica in bilateral legs. Pt has had epidural injection, facet block, and nerve ablation with temporary relief of pain. Denies weakness BLE. Had been on prednisone which helped with back. Now s/p L5/S1 minimally invasive TLIF    8/21 - POD#1 Pt seen and examined earlier today with Dr Medrano, in NAD. Appears comfortable, denies new complaints, no overnight events     8/22- POD#2 Patient seen and examined this AM. Reports severe lower back incisional pain when laying on his back, more comfortable on his side. Yesterday ambulated with PT, sat up in chair. When he got to bed yesterday evening he reported + spasm in his lower back. He denies weakness, urinary bowel incontinence.     Vital Signs Last 24 Hrs  T(C): 37.6 (22 Aug 2019 05:22), Max: 37.6 (22 Aug 2019 05:22)  T(F): 99.6 (22 Aug 2019 05:22), Max: 99.6 (22 Aug 2019 05:22)  HR: 83 (22 Aug 2019 05:22) (58 - 83)  BP: 96/36 (22 Aug 2019 05:22) (93/44 - 99/41)  BP(mean): --  RR: 16 (22 Aug 2019 05:22) (16 - 16)  SpO2: 97% (22 Aug 2019 05:22) (97% - 100%)    MEDICATIONS  (STANDING):  cyclobenzaprine 10 milliGRAM(s) Oral three times a day  docusate sodium 100 milliGRAM(s) Oral two times a day  heparin  Injectable 5000 Unit(s) SubCutaneous every 12 hours  multivitamin 1 Tablet(s) Oral daily  senna 2 Tablet(s) Oral at bedtime  tamsulosin 0.4 milliGRAM(s) Oral at bedtime    MEDICATIONS  (PRN):  acetaminophen   Tablet .. 650 milliGRAM(s) Oral every 6 hours PRN Temp greater or equal to 38.5C (101.3F), Mild Pain (1 - 3)  acetaminophen  IVPB .. 1000 milliGRAM(s) IV Intermittent once PRN Mild Pain (1 - 3)  HYDROmorphone  Injectable 2 milliGRAM(s) IV Push every 3 hours PRN 10/10 breakthrough pain  ondansetron Injectable 4 milliGRAM(s) IV Push every 6 hours PRN Nausea and/or Vomiting  oxyCODONE    IR 5 milliGRAM(s) Oral every 4 hours PRN Moderate Pain (4 - 6)  oxyCODONE    IR 10 milliGRAM(s) Oral every 6 hours PRN Severe Pain (7 - 10)      PHYSICAL EXAM:  Constitutional: Awake / alert  HEENT: PERRLA, EOMI  Neck: Supple  Respiratory: Breath sounds are clear bilaterally  Cardiovascular: S1 and S2, regular rhythm  Gastrointestinal: Soft, NT/ND  Musculoskeletal: no joint swelling/tenderness, no abnormal movements  Skin: Drsg flat C/D/I    Neurological Exam:  HF: A x O x 3, appropriately interactive, normal affect, speech fluent, no aphasia or paraphasic errors. Naming /repetition intact   CN: PERRL, EOMI, facial sensation normal, no NLFD, tongue midline  Motor: No pronator drift, Strength 5/5 in all 4 ext, normal bulk and tone, no tremor, rigidity or bradykinesia  Sens: dec sens b/l LE to LT  Reflexes: Symmetric and normal, downgoing toes b/l  Coord:  No FNFA, dysmetria, MAXI intact   Gait/Balance: Cannot test          LABS:                         12.7   13.68 )-----------( 193      ( 21 Aug 2019 06:37 )             38.8     08-21    141  |  104  |  14  ----------------------------<  104<H>  4.6   |  31  |  1.13    Ca    9.0      21 Aug 2019 06:37
Patient is a 63y old  Male who presents with a chief complaint of spine surgery (20 Aug 2019 22:41)      HPI:  62 yo male PMH DVT on Xarelto (held), BPH, HLD, presented with c/o back pain, numbness / tingling, and sciatica in bilateral legs. Pt has had epidural injection, facet block, and nerve ablation with temporary relief of pain. Denies weakness BLE. Had been on prednisone which helped with back. Now s/p L5/S1 minimally invasive TLIF    8/21 - POD#1 Pt seen and examined earlier today with Dr Medrano, in NAD. Appears comfortable, denies new complaints, no overnight events         acetaminophen  IVPB .. 1000 milliGRAM(s) IV Intermittent once PRN  cyclobenzaprine 10 milliGRAM(s) Oral three times a day PRN  docusate sodium 100 milliGRAM(s) Oral two times a day  HYDROmorphone  Injectable 2 milliGRAM(s) IV Push every 3 hours PRN  multivitamin 1 Tablet(s) Oral daily  ondansetron Injectable 4 milliGRAM(s) IV Push every 6 hours PRN  oxyCODONE    IR 5 milliGRAM(s) Oral every 4 hours PRN  oxyCODONE    IR 10 milliGRAM(s) Oral every 6 hours PRN  senna 2 Tablet(s) Oral at bedtime  sodium chloride 0.9%. 1000 milliLiter(s) IV Continuous <Continuous>  tamsulosin 0.4 milliGRAM(s) Oral at bedtime  tobramycin 0.3% Ophthalmic Solution 1 Drop(s) Both EYES every 6 hours        Vital Signs Last 24 Hrs  T(C): 36.7 (21 Aug 2019 05:04), Max: 36.7 (20 Aug 2019 13:50)  T(F): 98 (21 Aug 2019 05:04), Max: 98 (20 Aug 2019 13:50)  HR: 56 (21 Aug 2019 05:04) (56 - 92)  BP: 104/56 (21 Aug 2019 05:04) (102/55 - 132/64)  RR: 16 (21 Aug 2019 05:04) (12 - 16)  SpO2: 99% (21 Aug 2019 05:04) (98% - 100%)      Physical Exam:  Constitutional: Awake / alert  HEENT: PERRLA, EOMI  Neck: Supple  Respiratory: Breath sounds are clear bilaterally  Cardiovascular: S1 and S2, regular rhythm  Gastrointestinal: Soft, NT/ND  Musculoskeletal: no joint swelling/tenderness, no abnormal movements  Skin: Drsg C/D/I    Neurological Exam:  HF: A x O x 3, appropriately interactive, normal affect, speech fluent, no aphasia or paraphasic errors. Naming /repetition intact   CN: PERRL, EOMI, facial sensation normal, no NLFD, tongue midline  Motor: No pronator drift, Strength 5/5 in all 4 ext, normal bulk and tone, no tremor, rigidity or bradykinesia  Sens: dec sens b/l LE  Reflexes: Symmetric and normal, downgoing toes b/l  Coord:  No FNFA, dysmetria, MAXI intact   Gait/Balance: Cannot test                            12.7   13.68 )-----------( 193      ( 21 Aug 2019 06:37 )             38.8     141  |  104  |  14  ----------------------------<  104<H>  4.6   |  31  |  1.13    Ca    9.0      21 Aug 2019 06:37

## 2019-08-23 ENCOUNTER — TRANSCRIPTION ENCOUNTER (OUTPATIENT)
Age: 64
End: 2019-08-23

## 2019-08-23 VITALS
OXYGEN SATURATION: 100 % | DIASTOLIC BLOOD PRESSURE: 70 MMHG | HEART RATE: 72 BPM | TEMPERATURE: 98 F | RESPIRATION RATE: 16 BRPM | SYSTOLIC BLOOD PRESSURE: 117 MMHG

## 2019-08-23 PROCEDURE — 99238 HOSP IP/OBS DSCHRG MGMT 30/<: CPT

## 2019-08-23 RX ORDER — OXYCODONE HYDROCHLORIDE 5 MG/1
1 TABLET ORAL
Qty: 35 | Refills: 0
Start: 2019-08-23 | End: 2019-09-05

## 2019-08-23 RX ORDER — ACETAMINOPHEN 500 MG
3 TABLET ORAL
Qty: 0 | Refills: 0 | DISCHARGE
Start: 2019-08-23

## 2019-08-23 RX ORDER — ACETAMINOPHEN 500 MG
975 TABLET ORAL EVERY 6 HOURS
Refills: 0 | Status: DISCONTINUED | OUTPATIENT
Start: 2019-08-23 | End: 2019-08-23

## 2019-08-23 RX ORDER — CYCLOBENZAPRINE HYDROCHLORIDE 10 MG/1
1 TABLET, FILM COATED ORAL
Qty: 42 | Refills: 0
Start: 2019-08-23 | End: 2019-09-05

## 2019-08-23 RX ORDER — SENNA PLUS 8.6 MG/1
2 TABLET ORAL
Qty: 28 | Refills: 0
Start: 2019-08-23 | End: 2019-09-05

## 2019-08-23 RX ORDER — DOCUSATE SODIUM 100 MG
1 CAPSULE ORAL
Qty: 28 | Refills: 0
Start: 2019-08-23 | End: 2019-09-05

## 2019-08-23 RX ADMIN — Medication 975 MILLIGRAM(S): at 09:25

## 2019-08-23 RX ADMIN — Medication 100 MILLIGRAM(S): at 04:45

## 2019-08-23 RX ADMIN — CYCLOBENZAPRINE HYDROCHLORIDE 10 MILLIGRAM(S): 10 TABLET, FILM COATED ORAL at 04:45

## 2019-08-23 RX ADMIN — Medication 975 MILLIGRAM(S): at 08:29

## 2019-08-23 RX ADMIN — HEPARIN SODIUM 5000 UNIT(S): 5000 INJECTION INTRAVENOUS; SUBCUTANEOUS at 04:46

## 2019-08-23 NOTE — DISCHARGE NOTE PROVIDER - NSDCCPCAREPLAN_GEN_ALL_CORE_FT
PRINCIPAL DISCHARGE DIAGNOSIS  Diagnosis: Lumbar radiculopathy  Assessment and Plan of Treatment: Follow up with Dr. Medrano in office in 10 days. Please call the office, or visit the nearest emergency room if you experience fever >101.3, drainage from your wound, swelling, sudden weakness, lethargy, or nausea/vomiting. You may shower, pat wound dry, do not apply lotions to wound. Do not bathe, swim or submerge wound in water. Do not lift anything > 10lbs. Do not drive. Continue to wear LSO Brace when out of bed.      SECONDARY DISCHARGE DIAGNOSES  Diagnosis: Lumbar spondylosis  Assessment and Plan of Treatment:

## 2019-08-23 NOTE — DISCHARGE NOTE PROVIDER - CARE PROVIDER_API CALL
Reg Medrano; PhD)  Neurosurgery  [address] [facility] [address] [facility], [address]nd [place] [address]  Phone: [phone]  Fax: [phone]  Follow Up Time: 1 week

## 2019-08-23 NOTE — DISCHARGE NOTE NURSING/CASE MANAGEMENT/SOCIAL WORK - NSDCDPATPORTLINK_GEN_ALL_CORE
You can access the [facility], offered by [facility], by registering with the following website: [email or web address]

## 2019-08-23 NOTE — DISCHARGE NOTE PROVIDER - NSDCACTIVITY_GEN_ALL_CORE
No heavy lifting/straining/Stairs allowed/Walking - Indoors allowed/Walking - Outdoors allowed/Showering allowed/Do not drive or operate machinery

## 2019-08-23 NOTE — DISCHARGE NOTE PROVIDER - HOSPITAL COURSE
Patient is a 64 yo male PMH DVT on Xarelto (held), BPH, HLD, presented with complaint of back pain, numbness / tingling, and sciatica in bilateral legs. Patient has had epidural injection, facet block, and nerve ablation with temporary relief of pain. Denies weakness bilateral lower extremities. Had been on prednisone which helped with back. Now status post L5/S1 minimally invasive TLIF. Postop patient complaint of incisional lower back pain and muscle spasm. Pain relieved with PO Tylenol and Flexeril. Patient is voiding, ambulating without issue. Physical therapy recommending home physical therapy or outpatient PT. Patient is stable from a neurosurgical standpoint for discharge.

## 2019-08-27 PROBLEM — I38 ENDOCARDITIS, VALVE UNSPECIFIED: Chronic | Status: ACTIVE | Noted: 2019-08-12

## 2019-08-27 PROBLEM — N40.0 BENIGN PROSTATIC HYPERPLASIA WITHOUT LOWER URINARY TRACT SYMPTOMS: Chronic | Status: ACTIVE | Noted: 2019-08-12

## 2019-08-27 PROBLEM — I82.409 ACUTE EMBOLISM AND THROMBOSIS OF UNSPECIFIED DEEP VEINS OF UNSPECIFIED LOWER EXTREMITY: Chronic | Status: ACTIVE | Noted: 2017-08-24

## 2019-08-27 PROBLEM — E78.5 HYPERLIPIDEMIA, UNSPECIFIED: Chronic | Status: ACTIVE | Noted: 2019-08-12

## 2019-09-03 ENCOUNTER — APPOINTMENT (OUTPATIENT)
Dept: NEUROSURGERY | Facility: CLINIC | Age: 64
End: 2019-09-03
Payer: MEDICARE

## 2019-09-03 VITALS
HEIGHT: 68 IN | DIASTOLIC BLOOD PRESSURE: 92 MMHG | BODY MASS INDEX: 24.4 KG/M2 | HEART RATE: 70 BPM | TEMPERATURE: 98.4 F | OXYGEN SATURATION: 99 % | WEIGHT: 161 LBS | SYSTOLIC BLOOD PRESSURE: 152 MMHG

## 2019-09-03 PROCEDURE — 99024 POSTOP FOLLOW-UP VISIT: CPT

## 2019-09-04 VITALS
HEIGHT: 68 IN | WEIGHT: 161 LBS | RESPIRATION RATE: 16 BRPM | SYSTOLIC BLOOD PRESSURE: 152 MMHG | HEART RATE: 70 BPM | DIASTOLIC BLOOD PRESSURE: 92 MMHG | TEMPERATURE: 98.4 F | BODY MASS INDEX: 24.4 KG/M2 | OXYGEN SATURATION: 99 %

## 2019-09-04 NOTE — CONSULT LETTER
[Courtesy Letter:] : I had the pleasure of seeing your patient, [unfilled], in my office today. [Sincerely,] : Sincerely, [Dear  ___] : Dear  [unfilled], [FreeTextEntry2] : Keren Odonnell MD\par [address], \[place] [address]  [FreeTextEntry1] : Senthil Luther is a 64-year-old male who presents today for a postop evaluation. On August 20, 2019, patient underwent a L5/S1 minimally invasive transforaminal lumbar interbody fusion for lumbar radiculopathy and spondylosis. As you may recall, patient had been experiencing lower back pain and numbness to bilateral feet. Patient states he is no longer experiencing lower back pain. He is having some soreness in the incision site area. Patient states the numbness in his feet has remained the same. No difficulties with walking. He is walking long distance now. Denies weakness in his legs. Denies any redness, drainage, or swelling to incision site. Denies fever or chills. Patient states he is currently taking Dulcolax, Senokot, and a laxative. Patient states he moves his bowels every other day as opposed to daily prior to surgery.\par \par There is no imaging to review with the patient.\par \par Patient alert and oriented. In no distress noted. Steri-Strips removed without difficulty. Incision without any redness, drainage, or swelling. No fever or chills noted. Strength to bilateral legs 5/5. Reflexes normal and symmetric to the legs. Sensation to light touch to bilateral lower extremities  equal and normal. Patient is walking without difficulties.\par \par I have provided the patient with a prescription for an x-ray of the lumbar spine to evaluate alignment and hardware. Patient should have this completed prior to his next 4 week visit. I have promoted fluids and walking to normalize his bowel movements.  Patient aware to call with any further questions or concerns.\par  [FreeTextEntry3] : Queta Weems, MSN, FNP-BC\par Nurse Practitioner\par Department of Neurosurgery\par \par [facility] [place]\par [address],\[place] [address]\par \par Office: [phone]\par Fax: [phone]\par \par

## 2019-09-06 DIAGNOSIS — M47.26 OTHER SPONDYLOSIS WITH RADICULOPATHY, LUMBAR REGION: ICD-10-CM

## 2019-09-06 DIAGNOSIS — Z86.718 PERSONAL HISTORY OF OTHER VENOUS THROMBOSIS AND EMBOLISM: ICD-10-CM

## 2019-09-06 DIAGNOSIS — M51.16 INTERVERTEBRAL DISC DISORDERS WITH RADICULOPATHY, LUMBAR REGION: ICD-10-CM

## 2019-09-06 DIAGNOSIS — Z79.01 LONG TERM (CURRENT) USE OF ANTICOAGULANTS: ICD-10-CM

## 2019-09-06 DIAGNOSIS — E78.5 HYPERLIPIDEMIA, UNSPECIFIED: ICD-10-CM

## 2019-09-06 DIAGNOSIS — N40.0 BENIGN PROSTATIC HYPERPLASIA WITHOUT LOWER URINARY TRACT SYMPTOMS: ICD-10-CM

## 2019-09-26 ENCOUNTER — FORM ENCOUNTER (OUTPATIENT)
Age: 64
End: 2019-09-26

## 2019-09-27 ENCOUNTER — APPOINTMENT (OUTPATIENT)
Dept: RADIOLOGY | Facility: CLINIC | Age: 64
End: 2019-09-27
Payer: MEDICARE

## 2019-09-27 ENCOUNTER — OUTPATIENT (OUTPATIENT)
Dept: OUTPATIENT SERVICES | Facility: HOSPITAL | Age: 64
LOS: 1 days | End: 2019-09-27
Payer: MEDICARE

## 2019-09-27 DIAGNOSIS — Z98.890 OTHER SPECIFIED POSTPROCEDURAL STATES: Chronic | ICD-10-CM

## 2019-09-27 DIAGNOSIS — Z98.1 ARTHRODESIS STATUS: ICD-10-CM

## 2019-09-27 DIAGNOSIS — Z98.1 ARTHRODESIS STATUS: Chronic | ICD-10-CM

## 2019-09-27 PROCEDURE — 72100 X-RAY EXAM L-S SPINE 2/3 VWS: CPT

## 2019-09-27 PROCEDURE — 72100 X-RAY EXAM L-S SPINE 2/3 VWS: CPT | Mod: 26

## 2019-09-30 ENCOUNTER — APPOINTMENT (OUTPATIENT)
Dept: NEUROSURGERY | Facility: CLINIC | Age: 64
End: 2019-09-30
Payer: MEDICARE

## 2019-09-30 VITALS
WEIGHT: 164.38 LBS | SYSTOLIC BLOOD PRESSURE: 167 MMHG | BODY MASS INDEX: 24.91 KG/M2 | OXYGEN SATURATION: 99 % | HEART RATE: 68 BPM | HEIGHT: 68 IN | TEMPERATURE: 98.4 F | DIASTOLIC BLOOD PRESSURE: 99 MMHG

## 2019-09-30 PROCEDURE — 99024 POSTOP FOLLOW-UP VISIT: CPT

## 2019-10-01 NOTE — CONSULT LETTER
[Dear  ___] : Dear  [unfilled], [Courtesy Letter:] : I had the pleasure of seeing your patient, [unfilled], in my office today. [Sincerely,] : Sincerely, [FreeTextEntry2] : Keren Odonnell MD\par [address], \[place] [address]  [FreeTextEntry3] : Queta Weems, MSN, FNP-BC\par Nurse Practitioner\par Neurosurgery\par [address] [place] [address], [address]nd floor \[place] [address] \par Office: [phone] \par Fax: [phone]\par \par  [FreeTextEntry1] : Senthil Luther is a 64-year-old male who presents today for a postop evaluation. On August 20, 2019, patient underwent a L5/S1 minimally invasive transforaminal lumbar interbody fusion for lumbar radiculopathy and spondylosis. As you may recall, patient had been experiencing lower back pain and numbness to bilateral feet. Patient reports muscular type pain in his lower back region with prolonged sitting or standing. He denies any bilateral leg pain. He does experience bilateral foot numbness. This has not changed. Patient denies any fever or chills. He denies any redness, drainage, or swelling to incision site or surrounding skin. Patient denies any walking difficulties.\par \par Patient presents with an x-ray of the lumbar spine performed on September 27, 2019 which indicates hardware intact and stable alignment.\par \par Patient is alert and oriented. No distress noted. Incision without any redness, drainage, or swelling. No fever or chills noted. Strength to bilateral legs 5/5. Right patella reflex brisk. Left patellar reflex present. Bilateral Achilles tendon reflexes present and equal. Negative clonus. Sensation to light touch to bilateral lower extremities equal and normal. Patient is walking without difficulties.\par 	\par I have provided the patient with a prescription for a CT scan of the lumbar spine to evaluate for progression. Patient should have this completed prior to his next 6 week visit. I provided the patient with a prescription for physical therapy. Patient aware to call with any further questions or concerns.\par

## 2019-10-11 ENCOUNTER — APPOINTMENT (OUTPATIENT)
Dept: COLORECTAL SURGERY | Facility: CLINIC | Age: 64
End: 2019-10-11

## 2019-10-30 ENCOUNTER — APPOINTMENT (OUTPATIENT)
Dept: COLORECTAL SURGERY | Facility: CLINIC | Age: 64
End: 2019-10-30

## 2019-11-10 ENCOUNTER — FORM ENCOUNTER (OUTPATIENT)
Age: 64
End: 2019-11-10

## 2019-11-11 ENCOUNTER — APPOINTMENT (OUTPATIENT)
Dept: CT IMAGING | Facility: CLINIC | Age: 64
End: 2019-11-11
Payer: MEDICARE

## 2019-11-11 ENCOUNTER — OUTPATIENT (OUTPATIENT)
Dept: OUTPATIENT SERVICES | Facility: HOSPITAL | Age: 64
LOS: 1 days | End: 2019-11-11
Payer: MEDICARE

## 2019-11-11 DIAGNOSIS — Z98.890 OTHER SPECIFIED POSTPROCEDURAL STATES: Chronic | ICD-10-CM

## 2019-11-11 DIAGNOSIS — Z00.8 ENCOUNTER FOR OTHER GENERAL EXAMINATION: ICD-10-CM

## 2019-11-11 DIAGNOSIS — Z98.1 ARTHRODESIS STATUS: Chronic | ICD-10-CM

## 2019-11-11 DIAGNOSIS — Z98.1 ARTHRODESIS STATUS: ICD-10-CM

## 2019-11-11 PROCEDURE — 76376 3D RENDER W/INTRP POSTPROCES: CPT | Mod: 26

## 2019-11-11 PROCEDURE — 76376 3D RENDER W/INTRP POSTPROCES: CPT

## 2019-11-11 PROCEDURE — 72131 CT LUMBAR SPINE W/O DYE: CPT | Mod: 26

## 2019-11-11 PROCEDURE — 72131 CT LUMBAR SPINE W/O DYE: CPT

## 2019-11-13 ENCOUNTER — APPOINTMENT (OUTPATIENT)
Dept: NEUROSURGERY | Facility: CLINIC | Age: 64
End: 2019-11-13
Payer: MEDICARE

## 2019-11-13 VITALS
HEIGHT: 68 IN | WEIGHT: 173.31 LBS | OXYGEN SATURATION: 99 % | SYSTOLIC BLOOD PRESSURE: 162 MMHG | DIASTOLIC BLOOD PRESSURE: 86 MMHG | HEART RATE: 62 BPM | TEMPERATURE: 97.9 F | BODY MASS INDEX: 26.27 KG/M2

## 2019-11-13 PROCEDURE — 99024 POSTOP FOLLOW-UP VISIT: CPT

## 2019-11-13 NOTE — CONSULT LETTER
[Dear  ___] : Dear  [unfilled], [Sincerely,] : Sincerely, [FreeTextEntry2] : Keren Odonnell MD\par [address], \[place] [address] [FreeTextEntry1] : I have seen your patient Senthil Luther in my office for routine postsurgical followup. It is now 3 months since the patient underwent a minimally invasive transforaminal lumbar interbody fusion at L5. 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 He has been trouble for many years with progressive lower back pain, bilateral foot numbness, and progressive left greater than right sciatica. The patient had a spondylolisthesis with a dynamic instability and significant foraminal stenosis especially on the left-hand side at L5. Following surgery the patient had very good relief of his mechanical lower back pain as well as radiating symptoms down his legs. He had some persistent numbness in both feet. He had expected incisional discomfort but was otherwise very active and improving. Unfortunately, since he saw the patient previously while in engaging in advancing physical therapy and general exercise activities he is now experiencing increased lower back pain with radiation to the hip bilaterally and with some radiation to the groin on the left-hand side. The patient denies any weakness. He has persistent numbness in both lower extremities.\par \par I have reviewed with the patient his recent CT scan of the lumbar spine. The pedicle screw and carlton instrumentation is in ideal location. There is no evidence of loosening of the hardware. The interbody graft shows initial phases of fusion across the endplates the with the fusion is not yet complete. There is excellent decompression of the foramen on the left-hand side in particular as per the goal of surgery. Once again there are degenerative signs in the adjacent levels with mild scoliosis which is unchanged from preoperative imaging.\par \par On examination the incision areas he'll do extremely well. There is some expected ongoing tenderness in the paraspinal muscles adjacent to the surgical sites. The patient has extremely decreased lower back range of motion and experiences pain in the muscles with flexion extension rotation and lateral flexion. The patient certainly has very limited flexion and external rotation capacity and hips. In addition he has a severely impaired hamstring flexibility. The patient has intact strength especially with hip flexion knee flexion as well as dorsi and plantar flexion.\par \par I discussed with the patient at length that I interpreted his current symptom profile based on normal and intact hardware and good localization, is a result of increasing activity with very limited flexibility causing muscular and ligamentous strain both in the back and the lower extremities. At this time he is not able to perform his normal work duties because of his symptoms while recovering from surgery. I recommend that the patient be involved in ongoing focal physical therapy addressing specifically he is decreased flexibility. I anticipate that he will make steady improvement and be able ultimately to return to work without restriction. I will see the patient again in one month to evaluate his response to this treatment strategy. The patient indicated good understanding and was reassured by reviewing the images of his surgery.\par \par Thank you for very kindly including me in the ongoing evaluation and treatment of your patient. Please do not hesitate to contact me should any questions or concerns regarding his recent surgery where the recommendation for ongoing physical therapy before returning to his work environment. [FreeTextEntry3] : Reg Medrano MD, PhD, FRCPSC \par Attending Neurosurgeon \par  of Neurosurgery \par [facility] \par [address], [address]nd floor \[place] [address] \par Office: [phone] \par Fax: [phone]\par \par

## 2019-12-02 ENCOUNTER — RX RENEWAL (OUTPATIENT)
Age: 64
End: 2019-12-02

## 2019-12-03 ENCOUNTER — FORM ENCOUNTER (OUTPATIENT)
Age: 64
End: 2019-12-03

## 2019-12-04 ENCOUNTER — OUTPATIENT (OUTPATIENT)
Dept: OUTPATIENT SERVICES | Facility: HOSPITAL | Age: 64
LOS: 1 days | End: 2019-12-04
Payer: MEDICARE

## 2019-12-04 ENCOUNTER — APPOINTMENT (OUTPATIENT)
Dept: MRI IMAGING | Facility: CLINIC | Age: 64
End: 2019-12-04
Payer: MEDICARE

## 2019-12-04 DIAGNOSIS — Z98.890 OTHER SPECIFIED POSTPROCEDURAL STATES: Chronic | ICD-10-CM

## 2019-12-04 DIAGNOSIS — Z00.8 ENCOUNTER FOR OTHER GENERAL EXAMINATION: ICD-10-CM

## 2019-12-04 DIAGNOSIS — Z98.1 ARTHRODESIS STATUS: Chronic | ICD-10-CM

## 2019-12-04 PROCEDURE — 73721 MRI JNT OF LWR EXTRE W/O DYE: CPT

## 2019-12-04 PROCEDURE — 73721 MRI JNT OF LWR EXTRE W/O DYE: CPT | Mod: 26,LT

## 2019-12-13 ENCOUNTER — APPOINTMENT (OUTPATIENT)
Dept: NEUROSURGERY | Facility: CLINIC | Age: 64
End: 2019-12-13
Payer: MEDICARE

## 2019-12-13 VITALS
TEMPERATURE: 98.5 F | DIASTOLIC BLOOD PRESSURE: 90 MMHG | SYSTOLIC BLOOD PRESSURE: 158 MMHG | WEIGHT: 176 LBS | OXYGEN SATURATION: 99 % | BODY MASS INDEX: 26.67 KG/M2 | HEART RATE: 60 BPM | HEIGHT: 68 IN

## 2019-12-13 PROCEDURE — 99213 OFFICE O/P EST LOW 20 MIN: CPT

## 2019-12-17 ENCOUNTER — APPOINTMENT (OUTPATIENT)
Dept: ORTHOPEDIC SURGERY | Facility: CLINIC | Age: 64
End: 2019-12-17
Payer: MEDICARE

## 2019-12-17 VITALS — BODY MASS INDEX: 24.86 KG/M2 | WEIGHT: 164 LBS | HEIGHT: 68 IN

## 2019-12-17 PROCEDURE — 73502 X-RAY EXAM HIP UNI 2-3 VIEWS: CPT | Mod: LT

## 2019-12-17 PROCEDURE — 99214 OFFICE O/P EST MOD 30 MIN: CPT

## 2019-12-17 NOTE — HISTORY OF PRESENT ILLNESS
[de-identified] : 63yo male presents complaining of left hip pain for 5 months. He injured his hip at physical therapy for his back. He was performing hip abductions on a machine when he felt immediate pain left groin. Pain is moderate sometimes severe. He feels pain most of the time especially when sitting to standing, walking. He was sent for an MRI by his neurosurgeon. Overall pain worsening. He took a Medrol Dosepak which did not help.\par \par The patient's past medical history, past surgical history, medications, allergies, and social history were reviewed by me today with the patient and documented accordingly. In addition, the patient's family history, which is noncontributory to this visit, was also reviewed.\par

## 2019-12-17 NOTE — PHYSICAL EXAM
[de-identified] : \par The following radiographs were ordered and read by me during this patients visit. I reviewed each radiograph in detail with the patient and discussed the findings as highlighted below. \par \par AP and lateral of the left upper obtained today hip joint spaces are well maintained there is normal alignment there is no fracture\par  [de-identified] : General Exam\par \par Well developed, well nourished\par No apparent distress\par Oriented to person, place, and time\par Mood: Normal\par Affect: Normal\par Balance and coordination: Normal\par Gait: Normal\par \par Left hip exam\par \par Skin: Clean/dry and intact\par Inspection: No obvious deformity, no swelling, no ecchymosis.\par Tenderness: no tenderness over greater trochanter/glut medius insertion. No tenderness pubic symphysis, pubic tubercle, hip flexors. No ttp ischial tuberosity or buttock. No ttp over the ASIS/Illiac crest.\par ROM: 0-120°. Internal rotation 30 external rotation 70\par Painful ROM: +\par Additional tests: No pain with circumduction Pos impingement test at 90° positive impingement test at 60° +Epi +Clara\par Strength: 5/5 hip flexion/ADD/ABD/Q/H/TA/GS/EHL\par Neuro: Sensation in tact to light touch throughout in dp/sp/tib/simón/saph distributions\par Pulses: 2+ DP/PT pulses\par

## 2019-12-17 NOTE — DISCUSSION/SUMMARY
[de-identified] : \par 64-year-old male left hip pain. His MRI that shows a labral tear he has pain with hip range of motion and positive impingement test he certainly appears that pain coming from his hip joint. We discussed diagnostic options including guided injection. He is recent spine surgery and may have continued radicular pain related to S1. We will arrange for guided intra-articular injection for diagnostic and therapeutic purposes all questions answered

## 2019-12-23 ENCOUNTER — MED ADMIN CHARGE (OUTPATIENT)
Age: 64
End: 2019-12-23

## 2020-01-07 ENCOUNTER — APPOINTMENT (OUTPATIENT)
Dept: ORTHOPEDIC SURGERY | Facility: CLINIC | Age: 65
End: 2020-01-07
Payer: MEDICARE

## 2020-01-07 PROCEDURE — 99213 OFFICE O/P EST LOW 20 MIN: CPT

## 2020-01-07 NOTE — DISCUSSION/SUMMARY
[de-identified] : 54-year-old male with left hip labral tear he's extensive nonoperative treatment he continues to experience pain. He near-complete temporary relief with an intra-articular injection. We discussed continued nonoperative treatment with activity modification physical therapy. We discussed surgical treatment left hip arthroscopy labral debridement versus repair. We discussed the surgery postoperative protocol restrictions in detail. Risks benefits alternatives of surgery discussed including but not limited to risks of bleeding infection nerve and vessel injury continued pain stiffness need for future surgery at all complications such as DVT or PE and anesthesia complications. All questions were answered she will schedule at his convenience

## 2020-01-07 NOTE — PHYSICAL EXAM
[de-identified] : Left hip exam\par \par Skin: Clean/dry and intact\par Inspection: No obvious deformity, no swelling, no ecchymosis.\par Tenderness: no tenderness over greater trochanter/glut medius insertion. No tenderness pubic symphysis, pubic tubercle, hip flexors. No ttp ischial tuberosity or buttock. No ttp over the ASIS/Illiac crest.\par ROM: 0-120°. Internal rotation 10 external rotation 60\par Painful ROM: +\par Additional tests: No pain with circumduction pos impingement test at 90° positive impingement test at 60° +Epi +Clara\par Strength: 5/5 hip flexion/ADD/ABD/Q/H/TA/GS/EHL\par Neuro: Sensation in tact to light touch throughout in dp/sp/tib/simón/saph distributions\par Pulses: 2+ DP/PT pulses\par

## 2020-01-07 NOTE — HISTORY OF PRESENT ILLNESS
[de-identified] : 63yo male presents complaining of left hip pain for 5 months. He injured his hip at physical therapy for his back. He was performing hip abductions on a machine when he felt immediate pain left groin. Pain is moderate sometimes severe. He feels pain most of the time especially when sitting to standing, walking. He was sent for an MRI by his neurosurgeon. Overall pain worsening. He took a Medrol Dosepak which did not help.  he underwent US guided intra-articular hip injection with only a few hours of relief.

## 2020-01-13 ENCOUNTER — APPOINTMENT (OUTPATIENT)
Dept: NEUROSURGERY | Facility: CLINIC | Age: 65
End: 2020-01-13
Payer: MEDICARE

## 2020-01-13 VITALS
BODY MASS INDEX: 27.03 KG/M2 | OXYGEN SATURATION: 99 % | TEMPERATURE: 98.3 F | SYSTOLIC BLOOD PRESSURE: 142 MMHG | HEIGHT: 68 IN | DIASTOLIC BLOOD PRESSURE: 83 MMHG | WEIGHT: 178.38 LBS | HEART RATE: 63 BPM

## 2020-01-13 DIAGNOSIS — M25.552 PAIN IN LEFT HIP: ICD-10-CM

## 2020-01-13 PROCEDURE — 99211 OFF/OP EST MAY X REQ PHY/QHP: CPT

## 2020-01-13 NOTE — CONSULT LETTER
[Dear  ___] : Dear  [unfilled], [Courtesy Letter:] : I had the pleasure of seeing your patient, [unfilled], in my office today. [Sincerely,] : Sincerely, [FreeTextEntry2] : Keren Odonnell MD\par [address], \[place] [address]  [FreeTextEntry3] : Queta Weems, MSN, FNP-BC\par Nurse Practitioner\par Neurosurgery\par [address] [place] [address], 2nd floor \[place] [address] \par Office: [phone] \par Fax: [phone]\par \par  [FreeTextEntry1] : Senthil Luther is a 64-year-old male who presents today for a followup to his left hip pain. As you may recall, on August 20, 2019 patient had underwent L5-S1 minimally invasive TLIF procedure for spinal listhesis associated with degenerative disc and facet arthropathy causing stenosis of the affected level and left-sided radiculopathy. While performing postoperative stretching and exercising he had developed increased pain in the SI joint region with radiation to the groin. Patient was evaluated by an orthopedist. He was found to have a left labral tear. He is scheduled to undergo left labral tear repair on February 6, 2020. In regards to his lumbar symptoms, patient denies any lower back pain or left sided sciatica symptoms. He reports stable numbness to bilateral feet. \par \par There is no new imaging to review with the patient.\par \par Patient is alert and oriented. No distress noted. Strength to bilateral legs 5/5. Reflexes to bilateral legs present and equal. Negative clonus. Sensation to light touch to bilateral legs equal and normal. Positive left internal rotation.\par \par Patient has recovered very well from his lumbar surgery. At this point, the patient has no restrictions based on a neurosurgical standpoint. However, patient is currently being treated by his orthopedic surgeon for her left labral tear. We will follow up as needed. Patient is aware to call our services with any further questions or concerns or with any new or worsening lumbar symptoms.\par

## 2020-01-31 ENCOUNTER — OUTPATIENT (OUTPATIENT)
Dept: OUTPATIENT SERVICES | Facility: HOSPITAL | Age: 65
LOS: 1 days | Discharge: HOME | End: 2020-01-31

## 2020-01-31 VITALS
TEMPERATURE: 98 F | SYSTOLIC BLOOD PRESSURE: 145 MMHG | WEIGHT: 179.68 LBS | OXYGEN SATURATION: 99 % | HEART RATE: 67 BPM | RESPIRATION RATE: 16 BRPM | DIASTOLIC BLOOD PRESSURE: 75 MMHG | HEIGHT: 68 IN

## 2020-01-31 DIAGNOSIS — Z98.890 OTHER SPECIFIED POSTPROCEDURAL STATES: Chronic | ICD-10-CM

## 2020-01-31 DIAGNOSIS — M25.559 PAIN IN UNSPECIFIED HIP: ICD-10-CM

## 2020-01-31 DIAGNOSIS — N40.0 BENIGN PROSTATIC HYPERPLASIA WITHOUT LOWER URINARY TRACT SYMPTOMS: ICD-10-CM

## 2020-01-31 DIAGNOSIS — Z01.818 ENCOUNTER FOR OTHER PREPROCEDURAL EXAMINATION: ICD-10-CM

## 2020-01-31 DIAGNOSIS — Z98.1 ARTHRODESIS STATUS: Chronic | ICD-10-CM

## 2020-01-31 DIAGNOSIS — I48.91 UNSPECIFIED ATRIAL FIBRILLATION: ICD-10-CM

## 2020-01-31 DIAGNOSIS — S73.192A OTHER SPRAIN OF LEFT HIP, INITIAL ENCOUNTER: ICD-10-CM

## 2020-01-31 LAB
ANION GAP SERPL CALC-SCNC: 5 MMOL/L — SIGNIFICANT CHANGE UP (ref 5–17)
APTT BLD: 36.4 SEC — HIGH (ref 27.5–36.3)
BASOPHILS # BLD AUTO: 0.05 K/UL — SIGNIFICANT CHANGE UP (ref 0–0.2)
BASOPHILS NFR BLD AUTO: 0.9 % — SIGNIFICANT CHANGE UP (ref 0–2)
BUN SERPL-MCNC: 12 MG/DL — SIGNIFICANT CHANGE UP (ref 7–23)
CALCIUM SERPL-MCNC: 9.1 MG/DL — SIGNIFICANT CHANGE UP (ref 8.5–10.1)
CHLORIDE SERPL-SCNC: 105 MMOL/L — SIGNIFICANT CHANGE UP (ref 96–108)
CO2 SERPL-SCNC: 32 MMOL/L — HIGH (ref 22–31)
CREAT SERPL-MCNC: 1.23 MG/DL — SIGNIFICANT CHANGE UP (ref 0.5–1.3)
EOSINOPHIL # BLD AUTO: 0.12 K/UL — SIGNIFICANT CHANGE UP (ref 0–0.5)
EOSINOPHIL NFR BLD AUTO: 2 % — SIGNIFICANT CHANGE UP (ref 0–6)
GLUCOSE SERPL-MCNC: 133 MG/DL — HIGH (ref 70–99)
HCT VFR BLD CALC: 43.5 % — SIGNIFICANT CHANGE UP (ref 39–50)
HCV AB S/CO SERPL IA: 0.19 S/CO — SIGNIFICANT CHANGE UP (ref 0–0.99)
HCV AB SERPL-IMP: SIGNIFICANT CHANGE UP
HGB BLD-MCNC: 14.5 G/DL — SIGNIFICANT CHANGE UP (ref 13–17)
IMM GRANULOCYTES NFR BLD AUTO: 0.2 % — SIGNIFICANT CHANGE UP (ref 0–1.5)
INR BLD: 1.26 RATIO — HIGH (ref 0.88–1.16)
LYMPHOCYTES # BLD AUTO: 1.73 K/UL — SIGNIFICANT CHANGE UP (ref 1–3.3)
LYMPHOCYTES # BLD AUTO: 29.5 % — SIGNIFICANT CHANGE UP (ref 13–44)
MCHC RBC-ENTMCNC: 30.6 PG — SIGNIFICANT CHANGE UP (ref 27–34)
MCHC RBC-ENTMCNC: 33.3 GM/DL — SIGNIFICANT CHANGE UP (ref 32–36)
MCV RBC AUTO: 91.8 FL — SIGNIFICANT CHANGE UP (ref 80–100)
MONOCYTES # BLD AUTO: 0.4 K/UL — SIGNIFICANT CHANGE UP (ref 0–0.9)
MONOCYTES NFR BLD AUTO: 6.8 % — SIGNIFICANT CHANGE UP (ref 2–14)
NEUTROPHILS # BLD AUTO: 3.56 K/UL — SIGNIFICANT CHANGE UP (ref 1.8–7.4)
NEUTROPHILS NFR BLD AUTO: 60.6 % — SIGNIFICANT CHANGE UP (ref 43–77)
NRBC # BLD: 0 /100 WBCS — SIGNIFICANT CHANGE UP (ref 0–0)
PLATELET # BLD AUTO: 229 K/UL — SIGNIFICANT CHANGE UP (ref 150–400)
POTASSIUM SERPL-MCNC: 4.3 MMOL/L — SIGNIFICANT CHANGE UP (ref 3.5–5.3)
POTASSIUM SERPL-SCNC: 4.3 MMOL/L — SIGNIFICANT CHANGE UP (ref 3.5–5.3)
PROTHROM AB SERPL-ACNC: 14.2 SEC — HIGH (ref 10–12.9)
RBC # BLD: 4.74 M/UL — SIGNIFICANT CHANGE UP (ref 4.2–5.8)
RBC # FLD: 12.9 % — SIGNIFICANT CHANGE UP (ref 10.3–14.5)
SODIUM SERPL-SCNC: 142 MMOL/L — SIGNIFICANT CHANGE UP (ref 135–145)
WBC # BLD: 5.87 K/UL — SIGNIFICANT CHANGE UP (ref 3.8–10.5)
WBC # FLD AUTO: 5.87 K/UL — SIGNIFICANT CHANGE UP (ref 3.8–10.5)

## 2020-01-31 RX ORDER — PSYLLIUM SEED (WITH DEXTROSE)
0 POWDER (GRAM) ORAL
Qty: 0 | Refills: 0 | DISCHARGE

## 2020-01-31 RX ORDER — ALFUZOSIN HYDROCHLORIDE 10 MG/1
1 TABLET, EXTENDED RELEASE ORAL
Qty: 0 | Refills: 0 | DISCHARGE

## 2020-01-31 RX ORDER — FLECAINIDE ACETATE 50 MG
0 TABLET ORAL
Qty: 0 | Refills: 0 | DISCHARGE

## 2020-01-31 NOTE — H&P PST ADULT - NSICDXPASTMEDICALHX_GEN_ALL_CORE_FT
PAST MEDICAL HISTORY:  BPH (benign prostatic hyperplasia)     Degenerative lumbar spinal stenosis     DVT (deep venous thrombosis) 5/2017    One day post-op for right Knee Arthroscopy. Currently on xarelto    H/O prostatitis     HLD (hyperlipidemia)     Paroxysmal a-fib '2010 and '2012    Two episodes    Pilonidal cyst ' 87    Rotator cuff tear '95 and '96   Left    Sciatica both legs    Valvular heart disease

## 2020-01-31 NOTE — H&P PST ADULT - NSICDXPROBLEM_GEN_ALL_CORE_FT
PROBLEM DIAGNOSES  Problem: Hip pain  Assessment and Plan: scheduled for left hip arthroscopy    Problem: BPH without urinary obstruction  Assessment and Plan: continue meds    Problem: Afib  Assessment and Plan: hold xarelto as per cardiologist , cardiac clearance

## 2020-01-31 NOTE — H&P PST ADULT - ASSESSMENT
left hip arthritis , labral damage  CAPRINI SCORE    AGE RELATED RISK FACTORS                                                       MOBILITY RELATED FACTORS  [ ] Age 41-60 years                                            (1 Point)                  [ ] Bed rest                                                        (1 Point)  [x ] Age: 61-74 years                                           (2 Points)                [ ] Plaster cast                                                   (2 Points)  [ ] Age= 75 years                                              (3 Points)                 [ ] Bed bound for more than 72 hours                   (2 Points)    DISEASE RELATED RISK FACTORS                                               GENDER SPECIFIC FACTORS  [ ] Edema in the lower extremities                       (1 Point)                  [ ] Pregnancy                                                     (1 Point)  [ ] Varicose veins                                               (1 Point)                  [ ] Post-partum < 6 weeks                                   (1 Point)             [ ] BMI > 25 Kg/m2                                            (1 Point)                  [ ] Hormonal therapy  or oral contraception            (1 Point)                 [ ] Sepsis (in the previous month)                        (1 Point)                  [ ] History of pregnancy complications  [ ] Pneumonia or serious lung disease                                               [ ] Unexplained or recurrent                       (1 Point)           (in the previous month)                               (1 Point)  [ ] Abnormal pulmonary function test                     (1 Point)                 SURGERY RELATED RISK FACTORS  [ ] Acute myocardial infarction                              (1 Point)                 [ ]  Section                                            (1 Point)  [ ] Congestive heart failure (in the previous month)  (1 Point)                 [ ] Minor surgery                                                 (1 Point)   [ ] Inflammatory bowel disease                             (1 Point)                 [x ] Arthroscopic surgery                                        (2 Points)  [ ] Central venous access                                    (2 Points)                [ ] General surgery lasting more than 45 minutes   (2 Points)       [ ] Stroke (in the previous month)                          (5 Points)               [ ] Elective arthroplasty                                        (5 Points)                                                                                                                                               HEMATOLOGY RELATED FACTORS                                                 TRAUMA RELATED RISK FACTORS  [x ] Prior episodes of VTE                                     (3 Points)                 [ ] Fracture of the hip, pelvis, or leg                       (5 Points)  [ ] Positive family history for VTE                         (3 Points)                 [ ] Acute spinal cord injury (in the previous month)  (5 Points)  [ ] Prothrombin 83798 A                                      (3 Points)                 [ ] Paralysis  (less than 1 month)                          (5 Points)  [ ] Factor V Leiden                                             (3 Points)                 [ ] Multiple Trauma within 1 month                         (5 Points)  [ ] Lupus anticoagulants                                     (3 Points)                                                           [ ] Anticardiolipin antibodies                                (3 Points)                                                       [ ] High homocysteine in the blood                      (3 Points)                                             [ ] Other congenital or acquired thrombophilia       (3 Points)                                                [ ] Heparin induced thrombocytopenia                  (3 Points)                                          Total Score [      6    ]  Caprini Score 0-2: Low risk, No VTE Prophylaxis required for most patient's, encourage ambulation  Caprini Score 3-6: At Risk, Pharmacologic VTE prophylaxis is indicated for most patients ( in the absence of a contraindication)  Caprini Score Greater than or = 7: High Risk , pharmacologic VTE is indicated for most patients ( in the absence of a contraindication)    Caprini score indicates that the patient is high risk for VTE event ( score 6 or greater). Surgical patient's in this group will benefit from both pharmacologic prophylaxis and intermittent compression devices . Surgical team will determine the balance between VTE  risk and bleeding risk and other clinical considerations

## 2020-01-31 NOTE — H&P PST ADULT - HISTORY OF PRESENT ILLNESS
65 yo male , PMH- afib, dvt, bph c/o left hip pain secondary to arthritis and labral damage scheduled for left hip arthroscopy

## 2020-02-05 ENCOUNTER — TRANSCRIPTION ENCOUNTER (OUTPATIENT)
Age: 65
End: 2020-02-05

## 2020-02-05 ENCOUNTER — FORM ENCOUNTER (OUTPATIENT)
Age: 65
End: 2020-02-05

## 2020-02-05 NOTE — ASU DISCHARGE PLAN (ADULT/PEDIATRIC) - CARE PROVIDER_API CALL
Yassine Amador)  Orthopaedic Surgery  [address], [address]  [place] [address]  Phone: [phone]  Fax: [phone]  Follow Up Time:

## 2020-02-05 NOTE — ASU DISCHARGE PLAN (ADULT/PEDIATRIC) - PAIN MANAGEMENT
Prescriptions electronically submitted to pharmacy from [facility] Prescriptions electronically submitted to pharmacy from [facility]/Take over the counter pain medication

## 2020-02-05 NOTE — ASU DISCHARGE PLAN (ADULT/PEDIATRIC) - CALL YOUR DOCTOR IF YOU HAVE ANY OF THE FOLLOWING:
Fever greater than (need to indicate Fahrenheit or Celsius)/Swelling that gets worse/Wound/Surgical Site with redness, or foul smelling discharge or pus/Pain not relieved by Medications/Bleeding that does not stop Bleeding that does not stop/Numbness, tingling, color or temperature change to extremity/Swelling that gets worse/Wound/Surgical Site with redness, or foul smelling discharge or pus/Pain not relieved by Medications/Fever greater than (need to indicate Fahrenheit or Celsius)

## 2020-02-05 NOTE — ASU DISCHARGE PLAN (ADULT/PEDIATRIC) - ASU DC SPECIAL INSTRUCTIONSFT
Follow up with Dr Amador in 7-10 days. Call office for appointment. Take medications as prescribed. Keep dressing clean, dry, and intact. Rest, ice, and elevate affected extremity. Follow up with Dr Amador in 7-10 days. Call office for appointment.   Take medications as prescribed.   Keep dressing clean, dry, and intact.   Rest, ice, and elevate affected extremity.  Please refer to Dr. Amador's instruction sheet regarding medications to take after surgery  Please take aspirin and naproxen as prescribed.   Take over the counter pain medications as needed, only use the percocet for severe pain.  Please avoid alcohol while taking pain medications  Partial weight bearing LLE with crutches Follow up with Dr Amador in 7-10 days. Call office for appointment.   Take medications as prescribed.   Keep dressing clean, dry, and intact.   Rest, ice, and elevate affected extremity.  Please refer to Dr. Amador's instruction sheet regarding medications to take after surgery and post op protocol.  Please take aspirin as prescribed.   Take over the counter pain medications as needed, only use the percocet for severe pain.  Please avoid alcohol while taking pain medications  Partial weight bearing LLE with crutches

## 2020-02-05 NOTE — ASU DISCHARGE PLAN (ADULT/PEDIATRIC) - ACTIVITY LEVEL
No heavy lifting/No sports/gym/No excercise/Weight bearing as tolerated No sports/gym/No heavy lifting/No excercise/flat foot partial weight bearing

## 2020-02-06 ENCOUNTER — OUTPATIENT (OUTPATIENT)
Dept: OUTPATIENT SERVICES | Facility: HOSPITAL | Age: 65
LOS: 1 days | Discharge: HOME | End: 2020-02-06
Payer: MEDICARE

## 2020-02-06 ENCOUNTER — APPOINTMENT (OUTPATIENT)
Dept: ORTHOPEDIC SURGERY | Facility: HOSPITAL | Age: 65
End: 2020-02-06

## 2020-02-06 VITALS
HEIGHT: 68 IN | WEIGHT: 179.68 LBS | SYSTOLIC BLOOD PRESSURE: 148 MMHG | TEMPERATURE: 98 F | OXYGEN SATURATION: 99 % | HEART RATE: 63 BPM | DIASTOLIC BLOOD PRESSURE: 80 MMHG | RESPIRATION RATE: 16 BRPM

## 2020-02-06 VITALS
SYSTOLIC BLOOD PRESSURE: 118 MMHG | RESPIRATION RATE: 15 BRPM | DIASTOLIC BLOOD PRESSURE: 67 MMHG | HEART RATE: 62 BPM | OXYGEN SATURATION: 96 % | TEMPERATURE: 98 F

## 2020-02-06 DIAGNOSIS — Z98.890 OTHER SPECIFIED POSTPROCEDURAL STATES: Chronic | ICD-10-CM

## 2020-02-06 DIAGNOSIS — Z98.1 ARTHRODESIS STATUS: Chronic | ICD-10-CM

## 2020-02-06 PROCEDURE — 29862 HIP ARTHR0 W/DEBRIDEMENT: CPT | Mod: LT

## 2020-02-06 RX ORDER — SODIUM CHLORIDE 9 MG/ML
1000 INJECTION, SOLUTION INTRAVENOUS
Refills: 0 | Status: DISCONTINUED | OUTPATIENT
Start: 2020-02-06 | End: 2020-02-06

## 2020-02-06 RX ORDER — ASPIRIN/CALCIUM CARB/MAGNESIUM 324 MG
1 TABLET ORAL
Qty: 60 | Refills: 0
Start: 2020-02-06 | End: 2020-03-06

## 2020-02-06 RX ORDER — ONDANSETRON 8 MG/1
1 TABLET, FILM COATED ORAL
Qty: 42 | Refills: 0
Start: 2020-02-06 | End: 2020-02-12

## 2020-02-06 RX ORDER — SODIUM CHLORIDE 9 MG/ML
3 INJECTION INTRAMUSCULAR; INTRAVENOUS; SUBCUTANEOUS EVERY 8 HOURS
Refills: 0 | Status: DISCONTINUED | OUTPATIENT
Start: 2020-02-06 | End: 2020-02-06

## 2020-02-06 RX ORDER — DOCUSATE SODIUM 100 MG
1 CAPSULE ORAL
Qty: 15 | Refills: 0
Start: 2020-02-06 | End: 2020-02-10

## 2020-02-06 RX ORDER — OXYCODONE HYDROCHLORIDE 5 MG/1
1 TABLET ORAL
Qty: 20 | Refills: 0
Start: 2020-02-06 | End: 2020-02-10

## 2020-02-06 RX ORDER — FENTANYL CITRATE 50 UG/ML
25 INJECTION INTRAVENOUS
Refills: 0 | Status: DISCONTINUED | OUTPATIENT
Start: 2020-02-06 | End: 2020-02-06

## 2020-02-06 RX ADMIN — SODIUM CHLORIDE 3 MILLILITER(S): 9 INJECTION INTRAMUSCULAR; INTRAVENOUS; SUBCUTANEOUS at 07:32

## 2020-02-06 RX ADMIN — SODIUM CHLORIDE 125 MILLILITER(S): 9 INJECTION, SOLUTION INTRAVENOUS at 09:36

## 2020-02-06 RX ADMIN — FENTANYL CITRATE 25 MICROGRAM(S): 50 INJECTION INTRAVENOUS at 09:36

## 2020-02-06 RX ADMIN — FENTANYL CITRATE 25 MICROGRAM(S): 50 INJECTION INTRAVENOUS at 09:52

## 2020-02-06 RX ADMIN — FENTANYL CITRATE 25 MICROGRAM(S): 50 INJECTION INTRAVENOUS at 09:59

## 2020-02-06 NOTE — ASU PATIENT PROFILE, ADULT - PMH
BPH (benign prostatic hyperplasia)    Degenerative lumbar spinal stenosis    DVT (deep venous thrombosis)  5/2017    One day post-op for right Knee Arthroscopy. Currently on xarelto  H/O prostatitis    HLD (hyperlipidemia)    Paroxysmal a-fib  '2010 and '2012    Two episodes  Pilonidal cyst  ' 87  Rotator cuff tear  '95 and '96   Left  Sciatica  both legs  Valvular heart disease

## 2020-02-06 NOTE — BRIEF OPERATIVE NOTE - OPERATION/FINDINGS
left hip labral tear and chondromalacia, hip arthroscopy, labral debridement, chondroplasty  see op report

## 2020-02-06 NOTE — ASU PATIENT PROFILE, ADULT - PSH
H/O cervical spine surgery    History of excision of pilonidal cyst  ' 87  History of hydrocelectomy    S/P right knee arthroscopy  5/2017  S/P rotator cuff repair  ' 95 and '96    Left; right 2017  Status post colonoscopy  ' 2015   negative  Status post laminectomy with spinal fusion  ' 1991:  Cervical

## 2020-02-06 NOTE — ASU PATIENT PROFILE, ADULT - PAIN SCALE PREFERRED, PROFILE
History  Chief Complaint   Patient presents with    Testicle Pain     Pt reports left testicle pain since tuesday  pt states he was dx with a bacterial infection in his testicles and given abx  pt states he is now having more pain in his suprapubic area and pain with urination  HPI       Prior to Admission Medications   Prescriptions Last Dose Informant Patient Reported? Taking?   doxycycline hyclate (VIBRA-TABS) 100 mg tablet   Yes Yes   Sig: Take 100 mg by mouth 2 (two) times a day      Facility-Administered Medications: None       Past Medical History:   Diagnosis Date    No known problems        Past Surgical History:   Procedure Laterality Date    EYE SURGERY         History reviewed  No pertinent family history  I have reviewed and agree with the history as documented  Social History     Tobacco Use    Smoking status: Current Some Day Smoker     Types: E-Cigarettes    Smokeless tobacco: Never Used   Substance Use Topics    Alcohol use: Yes     Comment: "a lot"    Drug use: No        Review of Systems    Physical Exam  Physical Exam    Vital Signs  ED Triage Vitals [09/30/19 1831]   Temperature Pulse Respirations Blood Pressure SpO2   98 5 °F (36 9 °C) 84 18 159/72 97 %      Temp Source Heart Rate Source Patient Position - Orthostatic VS BP Location FiO2 (%)   Temporal Monitor Sitting Right arm --      Pain Score       --           Vitals:    09/30/19 1831   BP: 159/72   Pulse: 84   Patient Position - Orthostatic VS: Sitting         Visual Acuity      ED Medications  Medications - No data to display    Diagnostic Studies  Results Reviewed     None                 No orders to display              Procedures  Procedures       ED Course                               MDM    Disposition  Final diagnoses:   None     ED Disposition     None      Follow-up Information    None         Patient's Medications   Discharge Prescriptions    No medications on file     No discharge procedures on file      ED Provider  Electronically Signed by numerical 0-10

## 2020-02-10 DIAGNOSIS — Z86.718 PERSONAL HISTORY OF OTHER VENOUS THROMBOSIS AND EMBOLISM: ICD-10-CM

## 2020-02-10 DIAGNOSIS — Z79.01 LONG TERM (CURRENT) USE OF ANTICOAGULANTS: ICD-10-CM

## 2020-02-10 DIAGNOSIS — M24.152 OTHER ARTICULAR CARTILAGE DISORDERS, LEFT HIP: ICD-10-CM

## 2020-02-10 DIAGNOSIS — E78.5 HYPERLIPIDEMIA, UNSPECIFIED: ICD-10-CM

## 2020-02-10 DIAGNOSIS — N40.0 BENIGN PROSTATIC HYPERPLASIA WITHOUT LOWER URINARY TRACT SYMPTOMS: ICD-10-CM

## 2020-02-10 DIAGNOSIS — M16.12 UNILATERAL PRIMARY OSTEOARTHRITIS, LEFT HIP: ICD-10-CM

## 2020-02-10 DIAGNOSIS — I48.0 PAROXYSMAL ATRIAL FIBRILLATION: ICD-10-CM

## 2020-02-10 DIAGNOSIS — K21.9 GASTRO-ESOPHAGEAL REFLUX DISEASE WITHOUT ESOPHAGITIS: ICD-10-CM

## 2020-02-18 ENCOUNTER — APPOINTMENT (OUTPATIENT)
Dept: ORTHOPEDIC SURGERY | Facility: CLINIC | Age: 65
End: 2020-02-18
Payer: MEDICARE

## 2020-02-18 PROCEDURE — 99024 POSTOP FOLLOW-UP VISIT: CPT

## 2020-02-18 NOTE — HISTORY OF PRESENT ILLNESS
[de-identified] : L hip [de-identified] : 65yo M s/p Left hip arthroscopy, labral debridement, chondroplasty, 2/6/20. He reports soreness in his hip. He is walking without crutches he has not started physical therapy he denies numbness and tingling [de-identified] : Left hip exam\par Incisions well-healed no erythema\par No pain with hip range of motion 0-90° no pain with logroll\par Able to flex and extend all toes\par sensation intact throughout\par bcr.\par  [de-identified] : 65yo M s/p Left hip arthroscopy, labral debridement, chondroplasty, 2/6/20. [de-identified] : Sutures were removed and Steri-Strips placed. We reviewed postoperative protocol and restrictions we reviewed intraoperative photographs continue home exercise program encouraged to start physical therapy continue anti-inflammatory medications for pain DVT prophylaxis followup one month. We did discuss his underlying osteoarthritis he expressed understanding

## 2020-03-17 ENCOUNTER — APPOINTMENT (OUTPATIENT)
Dept: ORTHOPEDIC SURGERY | Facility: CLINIC | Age: 65
End: 2020-03-17
Payer: MEDICARE

## 2020-03-17 PROCEDURE — 20610 DRAIN/INJ JOINT/BURSA W/O US: CPT | Mod: 58,LT

## 2020-03-17 PROCEDURE — 99024 POSTOP FOLLOW-UP VISIT: CPT

## 2020-03-17 NOTE — HISTORY OF PRESENT ILLNESS
[de-identified] : L hip [de-identified] : 63yo M s/p Left hip arthroscopy, labral debridement, chondroplasty, 2/6/20.  He is going to PT.  Reports 15-20% improvement from surgery.  Pain mainly lateral aspect and right buttock into IT band.  No injuries.  Mobic helps significantly.  Denies numbness/tingling. He is having residual symptoms related to his underlying hip arthritis as well as lateral sided hip pain. He is requesting an injection for pain as he cannot take anti-inflammatory medicines [de-identified] : Left hip exam\par Incisions well-healed no erythema\par No pain with hip range of motion 0-90° no pain with logroll\par +ttp gt. pain w abd\par Able to flex and extend all toes\par sensation intact throughout\par bcr.\par  [de-identified] : 63yo M s/p Left hip arthroscopy, labral debridement, chondroplasty, 2/6/20. [de-identified] : Injection: Left hip trochanteric bursa.\par Indication: Trochanteric bursitis.\par \par A discussion was had with the patient regarding this procedure and all questions were answered. All risks, benefits and alternatives were discussed. These included but were not limited to bleeding, infection, and allergic reaction. The patient lay in the right lateral decubitus position and the point of maximal tenderness over the right greater trochanter was localized. Alcohol was then used to clean the skin, and betadine was used to sterilize and prep the area in this location on lateral aspect of the left hip. Ethyl chloride spray was then used as a topical anesthetic. A 21-gauge needle was used to inject 4cc of 1% lidocaine and 1cc of 40mg/ml methylprednisolone into the left trochanteric bursa. A sterile bandage was then applied. The patient tolerated the procedure well and there were no complications. \par \par Continue physical therapy. Followup in 4-6 weeks

## 2020-04-09 ENCOUNTER — TRANSCRIPTION ENCOUNTER (OUTPATIENT)
Age: 65
End: 2020-04-09

## 2020-04-21 ENCOUNTER — APPOINTMENT (OUTPATIENT)
Dept: ORTHOPEDIC SURGERY | Facility: CLINIC | Age: 65
End: 2020-04-21
Payer: MEDICARE

## 2020-04-21 DIAGNOSIS — M70.62 TROCHANTERIC BURSITIS, LEFT HIP: ICD-10-CM

## 2020-04-21 PROCEDURE — 99024 POSTOP FOLLOW-UP VISIT: CPT

## 2020-04-21 NOTE — HISTORY OF PRESENT ILLNESS
[Medical Office: ([facility])___] : at the medical office located in  [Home] : at home, [unfilled] , at the time of the visit. [Patient] : the patient [de-identified] : s/p L hip arhtroscopy and labral debriudemnt 2/6/20.  doing PT.  co groin and lateral hip pain.  c/o occasional clicking. cannot take nsaids.  taking narcotics.  feels pain similar to after surgery [de-identified] : L hip exam\par gait normal\par able to range 0-90.  points to groin and GT when asked where pain is\par able to move all toes\par reports intact sensation throughout [de-identified] : L hip [de-identified] : We reviewed postoperative protocol and restrictions. We discussed the significant amount of arthritis in his hip which may continue to cause symptoms as we discussed hip arthroplasty as definitive treatment for this preoperatively. You continue his physical therapy he will continue his current pain management plan he will followup in one month with repeat x-ray. work note given [de-identified] : 2 months s/p L hip debridment

## 2020-05-19 ENCOUNTER — APPOINTMENT (OUTPATIENT)
Dept: ORTHOPEDIC SURGERY | Facility: CLINIC | Age: 65
End: 2020-05-19
Payer: MEDICARE

## 2020-05-19 DIAGNOSIS — S73.192A OTHER SPRAIN OF LEFT HIP, INITIAL ENCOUNTER: ICD-10-CM

## 2020-05-19 PROCEDURE — 99213 OFFICE O/P EST LOW 20 MIN: CPT

## 2020-05-19 NOTE — PHYSICAL EXAM
[de-identified] : Left hip exam\par \par Skin: Clean/dry and intact\par Inspection: No obvious deformity, no swelling, no ecchymosis.\par Tenderness: no tenderness over greater trochanter/glut medius insertion. No tenderness pubic symphysis, pubic tubercle, hip flexors. No ttp ischial tuberosity or buttock. No ttp over the ASIS/Illiac crest.\par ROM: 0-90 w pain\par pain w flex/ir, pain w earline\par Strength: 5/5 hip flexion/ADD/ABD/Q/H/TA/GS/EHL\par Neuro: Sensation in tact to light touch throughout in dp/sp/tib/smión/saph distributions\par Pulses: 2+ DP/PT pulses [de-identified] : Repeat radiographs and MRI obtained outside were reviewed. Good joint spaces and mild narrowing consistent with osteoarthritis. Status post labral debridement. Bone marrow edema within the femoral head consistent with osteoarthritis\par \par

## 2020-05-19 NOTE — DISCUSSION/SUMMARY
[de-identified] : 64-year-old male now 3 months status post left hip arthroscopy labral debridement with significant underlying osteoarthritis. At time of surgery for multiple areas of full-thickness chondral loss. This was discussed with the patient postoperatively had multiple subsequent phone calls. We did discuss again the hip replacement as definitive treatment for hip arthritis however he is only 3 months out of arthroscopy. Recommend continued conservative management with additional physical therapy he may be weightbearing as tolerated but to limit his weight as pain allows. Consider intra-articular injection of his pain remains severe although we did discuss the disability potential to placement by at least 3 months if he decides to go with that procedure. All questions were answered. He will followup in 3 months

## 2020-05-19 NOTE — HISTORY OF PRESENT ILLNESS
[de-identified] : 65yo M s/p L hip arthroscopy and labral debridemnt 2/6/20. He is continuing to complain of pain in his hip. He is walking with a crutch. He has stopped his physical therapy because of pain. He was seen by an outside orthopedist and obtain new x-rays and new MRI and was told to be nonweightbearing

## 2020-08-07 NOTE — ED STATDOCS - ENMT, MLM
oral Nasal mucosa clear.  Mouth with normal mucosa  Throat has no vesicles, no oropharyngeal exudates and uvula is midline.

## 2020-10-06 ENCOUNTER — APPOINTMENT (OUTPATIENT)
Dept: ORTHOPEDIC SURGERY | Facility: CLINIC | Age: 65
End: 2020-10-06
Payer: MEDICARE

## 2020-10-06 VITALS
WEIGHT: 178 LBS | DIASTOLIC BLOOD PRESSURE: 76 MMHG | HEART RATE: 56 BPM | SYSTOLIC BLOOD PRESSURE: 146 MMHG | BODY MASS INDEX: 26.98 KG/M2 | HEIGHT: 68 IN

## 2020-10-06 PROCEDURE — 99213 OFFICE O/P EST LOW 20 MIN: CPT

## 2020-10-06 PROCEDURE — 73502 X-RAY EXAM HIP UNI 2-3 VIEWS: CPT | Mod: LT

## 2020-10-06 NOTE — PHYSICAL EXAM
[de-identified] : Left hip exam\par \par Skin: Clean/dry and intact\par Inspection: No obvious deformity, no swelling, no ecchymosis.\par Tenderness: no tenderness over greater trochanter/glut medius insertion. No tenderness pubic symphysis, pubic tubercle, hip flexors. No ttp ischial tuberosity or buttock. No ttp over the ASIS/Illiac crest.\par ROM: 0-90 w pain\par pain w flex/ir, pain w earline\par Strength: 5/5 hip flexion/ADD/ABD/Q/H/TA/GS/EHL\par Neuro: Sensation in tact to light touch throughout in dp/sp/tib/simón/saph distributions\par Pulses: 2+ DP/PT pulses  [de-identified] : \par The following radiographs were ordered and read by me during this patients visit. I reviewed each radiograph in detail with the patient and discussed the findings as highlighted below. \par \par AP pelvis AP lateral left A. Again seen is mild degenerative changes of the joint space narrowing.\par \par MRI of the left hip with severe arthritic changes with full-thickness chondral loss and bone marrow edema\par \par

## 2020-10-06 NOTE — HISTORY OF PRESENT ILLNESS
[de-identified] : 64yo M s/p L hip arthroscopy and labral debridement 2/6/20.  He reports continued groin pain. He is interested in hip replacement and since he continues regarding this. MRI done by an outside physician. He is here today further discuss

## 2020-10-06 NOTE — DISCUSSION/SUMMARY
[de-identified] : I discussed the treatment of degenerative arthritis with the patient at length today. I described the spectrum of treatment from nonoperative modalities to total joint arthroplasty. Noninvasive and nonoperative treatment modalities include weight reduction, activity modification with low impact exercise, PRN use of acetaminophen or anti-inflammatory medication if tolerated, glucosamine/chondroitin supplements, and physical therapy. Further treatments can include corticosteroid injection and the use of hyaluronic acid injections. Definitive treatment can certainly include total joint arthroplasty also. The risks and benefits of each treatment options was discussed and all questions were answered. refer for lucy

## 2020-10-20 ENCOUNTER — APPOINTMENT (OUTPATIENT)
Dept: ORTHOPEDIC SURGERY | Facility: CLINIC | Age: 65
End: 2020-10-20
Payer: MEDICARE

## 2020-10-20 DIAGNOSIS — M25.512 PAIN IN LEFT SHOULDER: ICD-10-CM

## 2020-10-20 PROCEDURE — 99214 OFFICE O/P EST MOD 30 MIN: CPT | Mod: 25

## 2020-10-20 PROCEDURE — 73030 X-RAY EXAM OF SHOULDER: CPT | Mod: LT

## 2020-10-20 PROCEDURE — 20610 DRAIN/INJ JOINT/BURSA W/O US: CPT | Mod: LT

## 2020-10-20 NOTE — DISCUSSION/SUMMARY
[de-identified] : Prior rotator cuff repair surgery with new worsening left shoulder pain requesting injection today\par \par Injection: Left shoulder (Subacromial).\par Indication: Impingement\par A discussion was had with the patient regarding this procedure and all questions were answered. All risks, benefits and alternatives were discussed. These included but were not limited to bleeding, infection, and allergic reaction. Alcohol was used to clean the skin, and betadine was used to sterilize and prep the area in the posterior aspect of the left shoulder. Ethyl chloride spray was then used as a topical anesthetic. A 21-gauge needle was used to inject 4cc of 1% lidocaine and 1cc of 40mg/ml methylprednisolone into the left subacromial space. A sterile bandage was then applied. The patient tolerated the procedure well and there were no complications. \par \par If not improved consider MRI

## 2020-10-20 NOTE — HISTORY OF PRESENT ILLNESS
[de-identified] : 64yo male presents complaining of left shoulder pain for a couple of weeks. He dropped a fork, went to pick it up felt immediate pain in the left shoulder. Majority of pain is anterior aspect worse with reaching outwards, how to cross over his body.  He also has pain lateral and posterior aspect. He has tried rest, ice, stretching with minimal relief. Denies numbness tingling

## 2020-10-20 NOTE — PHYSICAL EXAM
[de-identified] : General Exam\par \par Well developed, well nourished\par No apparent distress\par Oriented to person, place, and time\par Mood: Normal\par Affect: Normal\par Balance and coordination: Normal\par Gait: Normal\par \par Left shoulder exam\par \par Inspection: No swelling, ecchymosis or gross deformity.\par Skin: No masses, No lesions\par Tenderness: + bicipital tenderness, no tenderness to the greater tuberosity/RTC insertion, no anterior shoulder/lesser tuberosity tenderness. No tenderness SC joint, clavicle, +ttp AC joint.\par ROM: 140/60/T6\par Impingement tests: Positive [place]\par AC Joint: + pain with cross arm testing\par Biceps: +speed\par Strength: 5/5 abduction, external rotation, and internal rotation\par Neuro: AIN, PIN, Ulnar nerve motor intact\par Sensation: Intact to light touch in radial, median, ulnar, and axillary nerve distributions\par Vasc: 2+ radial pulse\par  [de-identified] : \par The following radiographs were ordered and read by me during this patients visit. I reviewed each radiograph in detail with the patient and discussed the findings as highlighted below. \par \par 3 views left shoulder obtained today. There is any anchor in the humerus consistent with prior rotator cuff repair\par \par

## 2020-10-29 ENCOUNTER — NON-APPOINTMENT (OUTPATIENT)
Age: 65
End: 2020-10-29

## 2020-11-06 ENCOUNTER — RESULT REVIEW (OUTPATIENT)
Age: 65
End: 2020-11-06

## 2020-11-06 ENCOUNTER — APPOINTMENT (OUTPATIENT)
Dept: ORTHOPEDIC SURGERY | Facility: CLINIC | Age: 65
End: 2020-11-06
Payer: MEDICARE

## 2020-11-06 ENCOUNTER — OUTPATIENT (OUTPATIENT)
Dept: OUTPATIENT SERVICES | Facility: HOSPITAL | Age: 65
LOS: 1 days | End: 2020-11-06
Payer: MEDICARE

## 2020-11-06 ENCOUNTER — APPOINTMENT (OUTPATIENT)
Dept: MRI IMAGING | Facility: CLINIC | Age: 65
End: 2020-11-06
Payer: MEDICARE

## 2020-11-06 VITALS
HEART RATE: 58 BPM | HEIGHT: 68 IN | DIASTOLIC BLOOD PRESSURE: 83 MMHG | SYSTOLIC BLOOD PRESSURE: 139 MMHG | WEIGHT: 172 LBS | BODY MASS INDEX: 26.07 KG/M2

## 2020-11-06 DIAGNOSIS — M79.605 PAIN IN LEFT LEG: ICD-10-CM

## 2020-11-06 DIAGNOSIS — M16.12 UNILATERAL PRIMARY OSTEOARTHRITIS, LEFT HIP: ICD-10-CM

## 2020-11-06 DIAGNOSIS — M25.512 PAIN IN LEFT SHOULDER: ICD-10-CM

## 2020-11-06 DIAGNOSIS — M48.00 SPINAL STENOSIS, SITE UNSPECIFIED: ICD-10-CM

## 2020-11-06 DIAGNOSIS — Z98.890 OTHER SPECIFIED POSTPROCEDURAL STATES: Chronic | ICD-10-CM

## 2020-11-06 DIAGNOSIS — Z98.1 ARTHRODESIS STATUS: Chronic | ICD-10-CM

## 2020-11-06 PROCEDURE — 73502 X-RAY EXAM HIP UNI 2-3 VIEWS: CPT | Mod: LT

## 2020-11-06 PROCEDURE — 99204 OFFICE O/P NEW MOD 45 MIN: CPT

## 2020-11-06 PROCEDURE — 99214 OFFICE O/P EST MOD 30 MIN: CPT

## 2020-11-06 PROCEDURE — 73221 MRI JOINT UPR EXTREM W/O DYE: CPT

## 2020-11-06 PROCEDURE — 73221 MRI JOINT UPR EXTREM W/O DYE: CPT | Mod: 26,LT

## 2020-11-06 NOTE — PHYSICAL EXAM
[Antalgic] : antalgic [LE] : Sensory: Intact in bilateral lower extremities [DP] : dorsalis pedis 2+ and symmetric bilaterally [PT] : posterior tibial 2+ and symmetric bilaterally [Normal] : no peripheral adenopathy appreciated [de-identified] : Examination of the left hip: the skin is warm and dry with no lesions there is some tenderness over the trochanteric bursa extending into the IT band the patient has pain with active range of motion  as well as passive range of motion to flexion 0-90 external rotation 00 30 and internal rotation 0-20 is exquisite pain with range of motion of the hip. Leg lengths are grossly equal. [de-identified] : 3 views of the left hip:there is arthritic changes noted with sclerosisand periarticular osteophytes there is minimal joint space narrowing noted.\par \par Review of the patient's most previous MRI as well as there arthroscopic hip films shows oderate arthritic changes with diffuse chondral loss that has progressed from previous MRI.

## 2020-11-06 NOTE — DISCUSSION/SUMMARY
[Medication Risks Reviewed] : Medication risks reviewed [Surgical risks reviewed] : Surgical risks reviewed [de-identified] : left hip osteoarthritis\par Left hip pain\par \par \par \par \par treatment options were reviewed with the patient although he has mild arthritic changes noted on plain radiographs his physical exam and MRI studies would suggest a progressive osteoarthritic changes to a more moderate degree. He has failed conservative treatment with injections physical therapy and activity modification in his symptoms persist. At this time the patient would be a candidate for anterior total hip arthroplasty.Risks benefits alternatives to the left anterior total hip arthroplasty were reviewed with the patient in detailit was also discussed the patient at the due to lumbar pathology and the hip replacement surgery will not address any of those lumbar symptoms so the chance he may have some pain at the surgery is related to his back.\par \par Dr. Jensen evaluated this patient and is guiding this patient's entire orthopedic management plan. The patient was advised that based upon their clinical presentation and radiographic findings they meet inclusion criteria for benefitting from an Anterior approach(ASI) total hip arthroplasty as a treatment option for severe arthritis of their [Left] hip.\par The spectrum of treatments for severe hip DJD were reviewed in detail. Dr. Jensen reviewed in detail the goals, alternatives, risks and benefits of ASI total hip arthroplasty. \par The patient was advised of the possible complications which are inclusive of but not exclusive to VTE-related, infectious-related, anesthetic-related, surgically-related, Lateral Femoral Cutaneous nerve injury-related, medically-related, and implant-related.\par The patient was advised that limb length equality may not be assured secondary to variabilities in pelvic malrotation, stable intraoperative fit, opposite side wear, and other anatomic deformities of the lower extremity.\par The patient was advised of the goals, alternatives, risks and benefits of autologous, directed and banked blood product transfusions for perioperative blood losses.\par A non-cemented type hip implant is utilized in consideration of bony integrity intraoperatively. \par The patient was advised of the goals, alternatives, risks and benefits of celebrex utilized by Dr. Jensen in his practice to prevent heterotopic ossification seen in patients post total hip arthroplasty.\par The patient was advised that they will require a medical preoperative risk evaluation by their PCP. Further medical subspecialty clearances such as cardiac may be indicated if felt needed by their PCP.\par The patient was advised he/she may call our office after careful consideration of their treatment options and further consultation with any other physician to begin the process of preoperative planning for elective [default value]ASI at a time of their choosing. \par

## 2020-11-06 NOTE — REVIEW OF SYSTEMS
[Joint Pain] : joint pain [Joint Stiffness] : joint stiffness [Urinary Frequency] : urinary frequency [Sleep Disturbances] : ~T sleep disturbances [Negative] : Heme/Lymph [[id number]] : left hip

## 2020-11-06 NOTE — HISTORY OF PRESENT ILLNESS
[___ mths] : [unfilled] month(s) ago [3] : a current pain level of 3/10 [NSAIDs] : relieved by nonsteroidal anti-inflammatory drugs [Rest] : relieved by rest [de-identified] : 65-year-old male presents complaining of left hip pain that began over a year ago. His hip pain started after he had lumbar fusion during therapy doing some knee exercises she felt sharp pain in his hip. At that time an MRI was obtained he had a labral tear he underwent arthroscopic labral debridement of his left hip at the time of his arthroscopy there was some early arthritic changes noted. After his arthroscopy he continues to have a WT pain in his groin being bounced by the pain is worse with activity he can be 8/10. He did have intra-articular hip injections the last one was back in August he does get some relief but doesn't last very long. He denies fever chills or night sweats she has no paresthesias motion of the knee at this time there is no orthopedic complaints he does have a history of DVT and nature fibrillation and is on Zaroxolyn.He is unable to take anti-inflammatory medications due to the use of xarelto. He is ambulating without assistive devices. [de-identified] : sit to stand

## 2020-11-09 ENCOUNTER — APPOINTMENT (OUTPATIENT)
Dept: COLORECTAL SURGERY | Facility: CLINIC | Age: 65
End: 2020-11-09

## 2020-11-11 LAB
ALBUMIN SERPL ELPH-MCNC: 4.3 G/DL
ALP BLD-CCNC: 102 U/L
ALT SERPL-CCNC: 18 U/L
ANION GAP SERPL CALC-SCNC: 12 MMOL/L
APTT BLD: 37.1 SEC
AST SERPL-CCNC: 20 U/L
BASOPHILS # BLD AUTO: 0.05 K/UL
BASOPHILS NFR BLD AUTO: 0.9 %
BILIRUB SERPL-MCNC: 0.6 MG/DL
BUN SERPL-MCNC: 18 MG/DL
CALCIUM SERPL-MCNC: 9.5 MG/DL
CHLORIDE SERPL-SCNC: 102 MMOL/L
CO2 SERPL-SCNC: 26 MMOL/L
CREAT SERPL-MCNC: 0.97 MG/DL
EOSINOPHIL # BLD AUTO: 0.17 K/UL
EOSINOPHIL NFR BLD AUTO: 3 %
GLUCOSE SERPL-MCNC: 111 MG/DL
HCT VFR BLD CALC: 41.4 %
HGB BLD-MCNC: 13.9 G/DL
IMM GRANULOCYTES NFR BLD AUTO: 0.2 %
INR PPP: 1.3 RATIO
LYMPHOCYTES # BLD AUTO: 2.07 K/UL
LYMPHOCYTES NFR BLD AUTO: 36.4 %
MAN DIFF?: NORMAL
MCHC RBC-ENTMCNC: 31.3 PG
MCHC RBC-ENTMCNC: 33.6 GM/DL
MCV RBC AUTO: 93.2 FL
MONOCYTES # BLD AUTO: 0.42 K/UL
MONOCYTES NFR BLD AUTO: 7.4 %
NEUTROPHILS # BLD AUTO: 2.97 K/UL
NEUTROPHILS NFR BLD AUTO: 52.1 %
PLATELET # BLD AUTO: 205 K/UL
POTASSIUM SERPL-SCNC: 4.8 MMOL/L
PROT SERPL-MCNC: 6.6 G/DL
PT BLD: 15.1 SEC
RBC # BLD: 4.44 M/UL
RBC # FLD: 12.9 %
SODIUM SERPL-SCNC: 139 MMOL/L
WBC # FLD AUTO: 5.69 K/UL

## 2020-11-17 RX ORDER — FLAXSEED OIL 1000 MG
CAPSULE ORAL
Refills: 0 | Status: ACTIVE | COMMUNITY

## 2020-11-17 RX ORDER — OXYCODONE 5 MG/1
5 TABLET ORAL
Qty: 42 | Refills: 0 | Status: DISCONTINUED | COMMUNITY
Start: 2019-12-13 | End: 2020-11-17

## 2020-11-17 RX ORDER — METHYLPREDNISOLONE 4 MG/1
4 TABLET ORAL
Qty: 1 | Refills: 0 | Status: DISCONTINUED | COMMUNITY
Start: 2019-12-02 | End: 2020-11-17

## 2020-11-17 RX ORDER — AMOXICILLIN 500 MG/1
500 TABLET, FILM COATED ORAL
Qty: 1 | Refills: 0 | Status: DISCONTINUED | COMMUNITY
Start: 2020-01-08 | End: 2020-11-17

## 2020-11-17 RX ORDER — ALFUZOSIN HYDROCHLORIDE 10 MG/1
10 TABLET, EXTENDED RELEASE ORAL
Qty: 90 | Refills: 0 | Status: DISCONTINUED | COMMUNITY
Start: 2019-02-04 | End: 2020-11-17

## 2020-11-17 RX ORDER — TRAMADOL HYDROCHLORIDE 50 MG/1
50 TABLET, COATED ORAL
Qty: 60 | Refills: 0 | Status: DISCONTINUED | COMMUNITY
Start: 2020-03-02 | End: 2020-11-17

## 2020-11-17 RX ORDER — RIVAROXABAN 2.5 MG/1
TABLET, FILM COATED ORAL
Refills: 0 | Status: ACTIVE | COMMUNITY

## 2020-11-17 RX ORDER — AMOXICILLIN 500 MG/1
500 TABLET, FILM COATED ORAL
Qty: 4 | Refills: 1 | Status: DISCONTINUED | COMMUNITY
Start: 2019-10-04 | End: 2020-11-17

## 2020-11-17 RX ORDER — PREDNISONE 10 MG/1
10 TABLET ORAL
Qty: 30 | Refills: 0 | Status: DISCONTINUED | COMMUNITY
Start: 2019-07-24 | End: 2020-11-17

## 2020-11-19 DIAGNOSIS — Z01.818 ENCOUNTER FOR OTHER PREPROCEDURAL EXAMINATION: ICD-10-CM

## 2020-11-20 ENCOUNTER — LABORATORY RESULT (OUTPATIENT)
Age: 65
End: 2020-11-20

## 2020-11-20 ENCOUNTER — APPOINTMENT (OUTPATIENT)
Dept: DISASTER EMERGENCY | Facility: CLINIC | Age: 65
End: 2020-11-20

## 2020-11-22 LAB — SARS-COV-2 N GENE NPH QL NAA+PROBE: NOT DETECTED

## 2020-11-23 VITALS
HEART RATE: 66 BPM | TEMPERATURE: 98 F | WEIGHT: 171.96 LBS | DIASTOLIC BLOOD PRESSURE: 74 MMHG | OXYGEN SATURATION: 99 % | SYSTOLIC BLOOD PRESSURE: 152 MMHG | HEIGHT: 68 IN | RESPIRATION RATE: 14 BRPM

## 2020-11-23 RX ORDER — DOXAZOSIN MESYLATE 4 MG
1 TABLET ORAL
Qty: 0 | Refills: 0 | DISCHARGE

## 2020-11-23 RX ORDER — MUPIROCIN 20 MG/G
1 OINTMENT TOPICAL
Qty: 1 | Refills: 0
Start: 2020-11-23 | End: 2020-11-27

## 2020-11-23 RX ORDER — MELOXICAM 15 MG/1
1 TABLET ORAL
Qty: 0 | Refills: 0 | DISCHARGE

## 2020-11-23 NOTE — H&P PST ADULT - HISTORY OF PRESENT ILLNESS
64 yo male reports 2 year history of left hip pain that increases when standing up and extended. As per Covid protocol telephone interview conducted.  66 yo male reports 2 year history of left hip pain that increases when standing up from a seated position and extended ambulation.  No current analgesics.

## 2020-11-23 NOTE — H&P PST ADULT - NSICDXPASTSURGICALHX_GEN_ALL_CORE_FT
PAST SURGICAL HISTORY:  H/O cervical spine surgery 1991    History of excision of pilonidal cyst ' 87    History of hydrocelectomy     S/P right knee arthroscopy 5/2017    S/P rotator cuff repair left   95 and '96        right 2017    Status post colonoscopy ' 2015   negative    Status post laminectomy with spinal fusion lumbar L5-S1     PAST SURGICAL HISTORY:  H/O cervical spine surgery 1991    History of excision of pilonidal cyst ' 87    History of hydrocelectomy     S/P right knee arthroscopy 5/2017    S/P rotator cuff repair left   95 and '96        right 2017    Status post arthroscopy of hip left 2/2020    Status post colonoscopy ' 2015   negative    Status post laminectomy with spinal fusion lumbar L5-S1

## 2020-11-23 NOTE — H&P PST ADULT - NSICDXPROBLEM_GEN_ALL_CORE_FT
PROBLEM DIAGNOSES  Problem: Primary osteoarthritis of left hip  Assessment and Plan: LEFT TOTAL HIP REPLACEMENT ANTERIOR APPROACH  MEDICAL/CARDIAC CLEARANCE  COVID SCREEN 11/28  NPO AFTER 12 AM 11/30, GATORADE, ANTIBACTERIAL WASH  XARELTO AS PER CARDIOLOGIST

## 2020-11-23 NOTE — H&P PST ADULT - COMMENTS
Patient was instructed to hold Xarelto as of 11/21 (colonoscopy 11/25) and remain off of Xarelto until surgery.

## 2020-11-23 NOTE — H&P PST ADULT - NSICDXPASTMEDICALHX_GEN_ALL_CORE_FT
PAST MEDICAL HISTORY:  BPH (benign prostatic hyperplasia)     Degenerative lumbar spinal stenosis     DVT (deep venous thrombosis) 5/2017    One day post-op for right Knee Arthroscopy. Currently on xarelto    H/O prostatitis     HLD (hyperlipidemia)     Paroxysmal a-fib '2010 and '2012    Two episodes    Pilonidal cyst ' 87    Rotator cuff tear '95 and '96   Left    Sciatica both legs    Valvular heart disease      PAST MEDICAL HISTORY:  BPH (benign prostatic hyperplasia)     Degenerative lumbar spinal stenosis     DVT (deep venous thrombosis) 5/2017    One day post-op for right Knee Arthroscopy. Currently on xarelto    H/O prostatitis     HLD (hyperlipidemia)     Paroxysmal a-fib '2010 and '2012    Two episodes    Pilonidal cyst ' 87    Rotator cuff tear '95 and '96   Left         Right 2011    Valvular heart disease

## 2020-11-24 ENCOUNTER — OUTPATIENT (OUTPATIENT)
Dept: OUTPATIENT SERVICES | Facility: HOSPITAL | Age: 65
LOS: 1 days | End: 2020-11-24
Payer: MEDICARE

## 2020-11-24 DIAGNOSIS — Z98.890 OTHER SPECIFIED POSTPROCEDURAL STATES: Chronic | ICD-10-CM

## 2020-11-24 DIAGNOSIS — M16.12 UNILATERAL PRIMARY OSTEOARTHRITIS, LEFT HIP: ICD-10-CM

## 2020-11-24 DIAGNOSIS — Z98.1 ARTHRODESIS STATUS: Chronic | ICD-10-CM

## 2020-11-24 DIAGNOSIS — I80.229 PHLEBITIS AND THROMBOPHLEBITIS OF UNSPECIFIED POPLITEAL VEIN: ICD-10-CM

## 2020-11-25 ENCOUNTER — APPOINTMENT (OUTPATIENT)
Dept: COLORECTAL SURGERY | Facility: CLINIC | Age: 65
End: 2020-11-25
Payer: MEDICARE

## 2020-11-25 PROCEDURE — G0463: CPT

## 2020-11-25 PROCEDURE — 45378 DIAGNOSTIC COLONOSCOPY: CPT

## 2020-11-27 ENCOUNTER — APPOINTMENT (OUTPATIENT)
Dept: DISASTER EMERGENCY | Facility: CLINIC | Age: 65
End: 2020-11-27

## 2020-11-28 ENCOUNTER — OUTPATIENT (OUTPATIENT)
Dept: OUTPATIENT SERVICES | Facility: HOSPITAL | Age: 65
LOS: 1 days | End: 2020-11-28

## 2020-11-28 DIAGNOSIS — Z98.890 OTHER SPECIFIED POSTPROCEDURAL STATES: Chronic | ICD-10-CM

## 2020-11-28 DIAGNOSIS — Z11.59 ENCOUNTER FOR SCREENING FOR OTHER VIRAL DISEASES: ICD-10-CM

## 2020-11-28 DIAGNOSIS — Z98.1 ARTHRODESIS STATUS: Chronic | ICD-10-CM

## 2020-11-28 LAB — SARS-COV-2 RNA SPEC QL NAA+PROBE: SIGNIFICANT CHANGE UP

## 2020-11-29 ENCOUNTER — FORM ENCOUNTER (OUTPATIENT)
Age: 65
End: 2020-11-29

## 2020-11-30 ENCOUNTER — TRANSCRIPTION ENCOUNTER (OUTPATIENT)
Age: 65
End: 2020-11-30

## 2020-11-30 ENCOUNTER — APPOINTMENT (OUTPATIENT)
Dept: ORTHOPEDIC SURGERY | Facility: HOSPITAL | Age: 65
End: 2020-11-30

## 2020-11-30 ENCOUNTER — RESULT REVIEW (OUTPATIENT)
Age: 65
End: 2020-11-30

## 2020-11-30 ENCOUNTER — INPATIENT (INPATIENT)
Facility: HOSPITAL | Age: 65
LOS: 0 days | Discharge: HOME | DRG: 470 | End: 2020-12-01
Attending: ORTHOPAEDIC SURGERY | Admitting: ORTHOPAEDIC SURGERY
Payer: MEDICARE

## 2020-11-30 VITALS
HEART RATE: 66 BPM | SYSTOLIC BLOOD PRESSURE: 152 MMHG | HEIGHT: 68 IN | RESPIRATION RATE: 13 BRPM | TEMPERATURE: 98 F | WEIGHT: 171.74 LBS | DIASTOLIC BLOOD PRESSURE: 74 MMHG | OXYGEN SATURATION: 100 %

## 2020-11-30 DIAGNOSIS — Z98.890 OTHER SPECIFIED POSTPROCEDURAL STATES: Chronic | ICD-10-CM

## 2020-11-30 DIAGNOSIS — Z98.1 ARTHRODESIS STATUS: Chronic | ICD-10-CM

## 2020-11-30 DIAGNOSIS — M16.12 UNILATERAL PRIMARY OSTEOARTHRITIS, LEFT HIP: ICD-10-CM

## 2020-11-30 LAB
ANION GAP SERPL CALC-SCNC: 11 MMOL/L — SIGNIFICANT CHANGE UP (ref 5–17)
APTT BLD: 31.4 SEC — SIGNIFICANT CHANGE UP (ref 27.5–35.5)
BLD GP AB SCN SERPL QL: SIGNIFICANT CHANGE UP
BUN SERPL-MCNC: 16 MG/DL — SIGNIFICANT CHANGE UP (ref 7–23)
CALCIUM SERPL-MCNC: 8.7 MG/DL — SIGNIFICANT CHANGE UP (ref 8.4–10.5)
CHLORIDE SERPL-SCNC: 103 MMOL/L — SIGNIFICANT CHANGE UP (ref 96–108)
CO2 SERPL-SCNC: 24 MMOL/L — SIGNIFICANT CHANGE UP (ref 22–31)
CREAT SERPL-MCNC: 1.29 MG/DL — SIGNIFICANT CHANGE UP (ref 0.5–1.3)
GLUCOSE SERPL-MCNC: 133 MG/DL — HIGH (ref 70–99)
HCT VFR BLD CALC: 34.4 % — LOW (ref 39–50)
HGB BLD-MCNC: 11.8 G/DL — LOW (ref 13–17)
INR BLD: 1.01 RATIO — SIGNIFICANT CHANGE UP (ref 0.88–1.16)
MCHC RBC-ENTMCNC: 30.9 PG — SIGNIFICANT CHANGE UP (ref 27–34)
MCHC RBC-ENTMCNC: 34.3 GM/DL — SIGNIFICANT CHANGE UP (ref 32–36)
MCV RBC AUTO: 90.1 FL — SIGNIFICANT CHANGE UP (ref 80–100)
NRBC # BLD: 0 /100 WBCS — SIGNIFICANT CHANGE UP (ref 0–0)
PLATELET # BLD AUTO: 215 K/UL — SIGNIFICANT CHANGE UP (ref 150–400)
POTASSIUM SERPL-MCNC: 4.9 MMOL/L — SIGNIFICANT CHANGE UP (ref 3.5–5.3)
POTASSIUM SERPL-SCNC: 4.9 MMOL/L — SIGNIFICANT CHANGE UP (ref 3.5–5.3)
PROTHROM AB SERPL-ACNC: 12.2 SEC — SIGNIFICANT CHANGE UP (ref 10.6–13.6)
RBC # BLD: 3.82 M/UL — LOW (ref 4.2–5.8)
RBC # FLD: 12.4 % — SIGNIFICANT CHANGE UP (ref 10.3–14.5)
SODIUM SERPL-SCNC: 138 MMOL/L — SIGNIFICANT CHANGE UP (ref 135–145)
WBC # BLD: 10.93 K/UL — HIGH (ref 3.8–10.5)
WBC # FLD AUTO: 10.93 K/UL — HIGH (ref 3.8–10.5)

## 2020-11-30 PROCEDURE — 93010 ELECTROCARDIOGRAM REPORT: CPT

## 2020-11-30 PROCEDURE — 88305 TISSUE EXAM BY PATHOLOGIST: CPT | Mod: 26

## 2020-11-30 PROCEDURE — 99223 1ST HOSP IP/OBS HIGH 75: CPT

## 2020-11-30 PROCEDURE — 12345: CPT | Mod: NC

## 2020-11-30 PROCEDURE — 73501 X-RAY EXAM HIP UNI 1 VIEW: CPT | Mod: 26,LT

## 2020-11-30 PROCEDURE — 88311 DECALCIFY TISSUE: CPT | Mod: 26

## 2020-11-30 PROCEDURE — 27130 TOTAL HIP ARTHROPLASTY: CPT | Mod: LT

## 2020-11-30 RX ORDER — APIXABAN 2.5 MG/1
2.5 TABLET, FILM COATED ORAL EVERY 12 HOURS
Refills: 0 | Status: DISCONTINUED | OUTPATIENT
Start: 2020-12-01 | End: 2020-12-01

## 2020-11-30 RX ORDER — APIXABAN 2.5 MG/1
5 TABLET, FILM COATED ORAL EVERY 12 HOURS
Refills: 0 | Status: DISCONTINUED | OUTPATIENT
Start: 2020-12-02 | End: 2020-12-01

## 2020-11-30 RX ORDER — HYDROMORPHONE HYDROCHLORIDE 2 MG/ML
0.5 INJECTION INTRAMUSCULAR; INTRAVENOUS; SUBCUTANEOUS
Refills: 0 | Status: DISCONTINUED | OUTPATIENT
Start: 2020-11-30 | End: 2020-11-30

## 2020-11-30 RX ORDER — ACETAMINOPHEN 500 MG
1000 TABLET ORAL EVERY 6 HOURS
Refills: 0 | Status: COMPLETED | OUTPATIENT
Start: 2020-11-30 | End: 2020-12-01

## 2020-11-30 RX ORDER — SODIUM CHLORIDE 9 MG/ML
500 INJECTION INTRAMUSCULAR; INTRAVENOUS; SUBCUTANEOUS ONCE
Refills: 0 | Status: COMPLETED | OUTPATIENT
Start: 2020-11-30 | End: 2020-11-30

## 2020-11-30 RX ORDER — LANOLIN ALCOHOL/MO/W.PET/CERES
5 CREAM (GRAM) TOPICAL AT BEDTIME
Refills: 0 | Status: COMPLETED | OUTPATIENT
Start: 2020-11-30 | End: 2020-11-30

## 2020-11-30 RX ORDER — APIXABAN 2.5 MG/1
1 TABLET, FILM COATED ORAL
Qty: 28 | Refills: 0
Start: 2020-11-30 | End: 2020-12-13

## 2020-11-30 RX ORDER — TRANEXAMIC ACID 100 MG/ML
1 INJECTION, SOLUTION INTRAVENOUS ONCE
Refills: 0 | Status: DISCONTINUED | OUTPATIENT
Start: 2020-11-30 | End: 2020-11-30

## 2020-11-30 RX ORDER — ACETAMINOPHEN 500 MG
1000 TABLET ORAL EVERY 8 HOURS
Refills: 0 | Status: DISCONTINUED | OUTPATIENT
Start: 2020-12-01 | End: 2020-12-01

## 2020-11-30 RX ORDER — PANTOPRAZOLE SODIUM 20 MG/1
40 TABLET, DELAYED RELEASE ORAL
Refills: 0 | Status: DISCONTINUED | OUTPATIENT
Start: 2020-11-30 | End: 2020-12-01

## 2020-11-30 RX ORDER — CHLORHEXIDINE GLUCONATE 213 G/1000ML
1 SOLUTION TOPICAL ONCE
Refills: 0 | Status: COMPLETED | OUTPATIENT
Start: 2020-11-30 | End: 2020-11-30

## 2020-11-30 RX ORDER — CEFAZOLIN SODIUM 1 G
2000 VIAL (EA) INJECTION EVERY 8 HOURS
Refills: 0 | Status: COMPLETED | OUTPATIENT
Start: 2020-11-30 | End: 2020-11-30

## 2020-11-30 RX ORDER — TRANEXAMIC ACID 100 MG/ML
1000 INJECTION, SOLUTION INTRAVENOUS ONCE
Refills: 0 | Status: COMPLETED | OUTPATIENT
Start: 2020-11-30 | End: 2020-11-30

## 2020-11-30 RX ORDER — OXYCODONE HYDROCHLORIDE 5 MG/1
10 TABLET ORAL
Refills: 0 | Status: DISCONTINUED | OUTPATIENT
Start: 2020-11-30 | End: 2020-12-01

## 2020-11-30 RX ORDER — OXYCODONE HYDROCHLORIDE 5 MG/1
5 TABLET ORAL
Refills: 0 | Status: DISCONTINUED | OUTPATIENT
Start: 2020-11-30 | End: 2020-12-01

## 2020-11-30 RX ORDER — SENNA PLUS 8.6 MG/1
2 TABLET ORAL
Qty: 0 | Refills: 0 | DISCHARGE
Start: 2020-11-30

## 2020-11-30 RX ORDER — HYDROMORPHONE HYDROCHLORIDE 2 MG/ML
0.5 INJECTION INTRAMUSCULAR; INTRAVENOUS; SUBCUTANEOUS
Refills: 0 | Status: DISCONTINUED | OUTPATIENT
Start: 2020-11-30 | End: 2020-12-01

## 2020-11-30 RX ORDER — SODIUM CHLORIDE 9 MG/ML
1000 INJECTION, SOLUTION INTRAVENOUS
Refills: 0 | Status: DISCONTINUED | OUTPATIENT
Start: 2020-11-30 | End: 2020-11-30

## 2020-11-30 RX ORDER — OMEPRAZOLE 10 MG/1
1 CAPSULE, DELAYED RELEASE ORAL
Qty: 30 | Refills: 1
Start: 2020-11-30 | End: 2021-01-28

## 2020-11-30 RX ORDER — POLYETHYLENE GLYCOL 3350 17 G/17G
17 POWDER, FOR SOLUTION ORAL DAILY
Refills: 0 | Status: DISCONTINUED | OUTPATIENT
Start: 2020-11-30 | End: 2020-12-01

## 2020-11-30 RX ORDER — ACETAMINOPHEN 500 MG
1000 TABLET ORAL ONCE
Refills: 0 | Status: COMPLETED | OUTPATIENT
Start: 2020-11-30 | End: 2020-11-30

## 2020-11-30 RX ORDER — CELECOXIB 200 MG/1
1 CAPSULE ORAL
Qty: 42 | Refills: 0
Start: 2020-11-30 | End: 2020-12-20

## 2020-11-30 RX ORDER — DOXAZOSIN MESYLATE 4 MG
8 TABLET ORAL AT BEDTIME
Refills: 0 | Status: DISCONTINUED | OUTPATIENT
Start: 2020-11-30 | End: 2020-12-01

## 2020-11-30 RX ORDER — CEFAZOLIN SODIUM 1 G
2000 VIAL (EA) INJECTION ONCE
Refills: 0 | Status: COMPLETED | OUTPATIENT
Start: 2020-11-30 | End: 2020-11-30

## 2020-11-30 RX ORDER — APREPITANT 80 MG/1
40 CAPSULE ORAL ONCE
Refills: 0 | Status: COMPLETED | OUTPATIENT
Start: 2020-11-30 | End: 2020-11-30

## 2020-11-30 RX ORDER — POLYETHYLENE GLYCOL 3350 17 G/17G
17 POWDER, FOR SOLUTION ORAL
Qty: 0 | Refills: 0 | DISCHARGE
Start: 2020-11-30

## 2020-11-30 RX ORDER — SENNA PLUS 8.6 MG/1
2 TABLET ORAL AT BEDTIME
Refills: 0 | Status: DISCONTINUED | OUTPATIENT
Start: 2020-11-30 | End: 2020-12-01

## 2020-11-30 RX ORDER — SODIUM CHLORIDE 9 MG/ML
1000 INJECTION, SOLUTION INTRAVENOUS
Refills: 0 | Status: DISCONTINUED | OUTPATIENT
Start: 2020-11-30 | End: 2020-12-01

## 2020-11-30 RX ORDER — ONDANSETRON 8 MG/1
4 TABLET, FILM COATED ORAL EVERY 6 HOURS
Refills: 0 | Status: DISCONTINUED | OUTPATIENT
Start: 2020-11-30 | End: 2020-12-01

## 2020-11-30 RX ORDER — TRANEXAMIC ACID 100 MG/ML
1 INJECTION, SOLUTION INTRAVENOUS ONCE
Refills: 0 | Status: DISCONTINUED | OUTPATIENT
Start: 2020-11-30 | End: 2020-12-01

## 2020-11-30 RX ORDER — ACETAMINOPHEN 500 MG
2 TABLET ORAL
Qty: 0 | Refills: 0 | DISCHARGE
Start: 2020-11-30 | End: 2020-12-02

## 2020-11-30 RX ADMIN — HYDROMORPHONE HYDROCHLORIDE 0.5 MILLIGRAM(S): 2 INJECTION INTRAMUSCULAR; INTRAVENOUS; SUBCUTANEOUS at 11:30

## 2020-11-30 RX ADMIN — SODIUM CHLORIDE 75 MILLILITER(S): 9 INJECTION, SOLUTION INTRAVENOUS at 10:28

## 2020-11-30 RX ADMIN — HYDROMORPHONE HYDROCHLORIDE 0.5 MILLIGRAM(S): 2 INJECTION INTRAMUSCULAR; INTRAVENOUS; SUBCUTANEOUS at 10:27

## 2020-11-30 RX ADMIN — Medication 1000 MILLIGRAM(S): at 15:28

## 2020-11-30 RX ADMIN — Medication 100 MILLIGRAM(S): at 15:46

## 2020-11-30 RX ADMIN — HYDROMORPHONE HYDROCHLORIDE 0.5 MILLIGRAM(S): 2 INJECTION INTRAMUSCULAR; INTRAVENOUS; SUBCUTANEOUS at 10:41

## 2020-11-30 RX ADMIN — SODIUM CHLORIDE 500 MILLILITER(S): 9 INJECTION INTRAMUSCULAR; INTRAVENOUS; SUBCUTANEOUS at 16:56

## 2020-11-30 RX ADMIN — HYDROMORPHONE HYDROCHLORIDE 0.5 MILLIGRAM(S): 2 INJECTION INTRAMUSCULAR; INTRAVENOUS; SUBCUTANEOUS at 11:00

## 2020-11-30 RX ADMIN — HYDROMORPHONE HYDROCHLORIDE 0.5 MILLIGRAM(S): 2 INJECTION INTRAMUSCULAR; INTRAVENOUS; SUBCUTANEOUS at 11:16

## 2020-11-30 RX ADMIN — APREPITANT 40 MILLIGRAM(S): 80 CAPSULE ORAL at 06:46

## 2020-11-30 RX ADMIN — OXYCODONE HYDROCHLORIDE 5 MILLIGRAM(S): 5 TABLET ORAL at 15:12

## 2020-11-30 RX ADMIN — CHLORHEXIDINE GLUCONATE 1 APPLICATION(S): 213 SOLUTION TOPICAL at 06:47

## 2020-11-30 RX ADMIN — SODIUM CHLORIDE 125 MILLILITER(S): 9 INJECTION, SOLUTION INTRAVENOUS at 12:17

## 2020-11-30 RX ADMIN — HYDROMORPHONE HYDROCHLORIDE 0.5 MILLIGRAM(S): 2 INJECTION INTRAMUSCULAR; INTRAVENOUS; SUBCUTANEOUS at 10:42

## 2020-11-30 RX ADMIN — Medication 400 MILLIGRAM(S): at 15:07

## 2020-11-30 RX ADMIN — OXYCODONE HYDROCHLORIDE 10 MILLIGRAM(S): 5 TABLET ORAL at 18:43

## 2020-11-30 RX ADMIN — Medication 5 MILLIGRAM(S): at 22:06

## 2020-11-30 RX ADMIN — SODIUM CHLORIDE 500 MILLILITER(S): 9 INJECTION INTRAMUSCULAR; INTRAVENOUS; SUBCUTANEOUS at 10:42

## 2020-11-30 RX ADMIN — Medication 100 MILLIGRAM(S): at 23:14

## 2020-11-30 RX ADMIN — Medication 400 MILLIGRAM(S): at 21:07

## 2020-11-30 RX ADMIN — OXYCODONE HYDROCHLORIDE 5 MILLIGRAM(S): 5 TABLET ORAL at 15:42

## 2020-11-30 RX ADMIN — Medication 8 MILLIGRAM(S): at 21:07

## 2020-11-30 RX ADMIN — SODIUM CHLORIDE 125 MILLILITER(S): 9 INJECTION, SOLUTION INTRAVENOUS at 21:07

## 2020-11-30 RX ADMIN — OXYCODONE HYDROCHLORIDE 10 MILLIGRAM(S): 5 TABLET ORAL at 19:20

## 2020-11-30 NOTE — PHYSICAL THERAPY INITIAL EVALUATION ADULT - RANGE OF MOTION EXAMINATION, REHAB EVAL
left RC tear noted/bilateral upper extremity ROM was WFL (within functional limits)/bilateral lower extremity ROM was WFL (within functional limits)

## 2020-11-30 NOTE — DISCHARGE NOTE PROVIDER - NSDCCPGOAL_GEN_ALL_CORE_FT
To get better and follow your care plan as instructed. Improve ambulation, ADLs and quality of life.

## 2020-11-30 NOTE — BRIEF OPERATIVE NOTE - NSICDXBRIEFPREOP_GEN_ALL_CORE_FT
PRE-OP DIAGNOSIS:  Degenerative joint disease (DJD) of hip 30-Nov-2020 09:54:56 left Luis Angel Rice

## 2020-11-30 NOTE — DISCHARGE NOTE NURSING/CASE MANAGEMENT/SOCIAL WORK - PATIENT PORTAL LINK FT
You can access the FollowMyHealth Patient Portal offered by [facility] by registering at the following website: [email or web address]. By joining [facility]’s FollowMyHealth portal, you will also be able to view your health information using other applications (apps) compatible with our system.

## 2020-11-30 NOTE — PHYSICAL THERAPY INITIAL EVALUATION ADULT - GAIT TRAINING, PT EVAL
Pt will ambulate with a RW for 150 feet independently in 1-2 days Pt will ambulate with a RW for 150 feet independently in 1-2 days. 13 steps with SAC/HR and supervision in 1-2 days

## 2020-11-30 NOTE — PROGRESS NOTE ADULT - SUBJECTIVE AND OBJECTIVE BOX
Orthopaedic Post Op Note    Procedure: Left Anterior THR  Surgeon: Rufino Jensen     65y Male comfortable, without complaints. Finished lunch, well-tolerated. Reported pain score = 2  Denies N/V, CP, SOB, numbness/tingling of extremities.    PE:  Vital Signs Last 24 Hrs  T(C): 36.4 (30 Nov 2020 12:00), Max: 36.9 (30 Nov 2020 10:10)  T(F): 97.6 (30 Nov 2020 12:00), Max: 98.4 (30 Nov 2020 10:10)  HR: 63 (30 Nov 2020 12:00) (63 - 84)  BP: 109/57 (30 Nov 2020 12:00) (97/57 - 152/74)  RR: 13 (30 Nov 2020 12:00) (12 - 18)  SpO2: 100% (30 Nov 2020 12:00) (98% - 100%)  General: Pt alert and oriented   Lungs: + BS CTA bilaterally  Heart: +S1 & S2 heard, RRR  Abd: + BS heard, soft, NT, ND  Left Hip [place] Dressing: C/D/I   Bilateral LEs:  Motor:   5/5 dorsiflexion, plantarflexion, EHL  Sensation intact to LT  2+ DP Pulses  SCDs in place    A/P: 65y Male stable POD#0 s/p Left Anteroir THR   -  Acetaminophen, Celebrex, Oxycodone, Dilaudid for Pain Control   - DVT ppx: Eliquis 2.5mg q 12h x 2 doses, then Eliquis 5mg q 12h  - Katie op IV abx: Ancef  - Celebrex for HO ppx  - total hip precautions  - PT, OT per protocol  - F/U AM Labs  DCP = home tomorrow pending PT, OT, medical clearance

## 2020-11-30 NOTE — CONSULT NOTE ADULT - SUBJECTIVE AND OBJECTIVE BOX
Patient is a 65y old  Male who presents with a chief complaint of     HPI:  Patient is seen and examined.      PAST MEDICAL & SURGICAL HISTORY:  BPH (benign prostatic hyperplasia)    Valvular heart disease    HLD (hyperlipidemia)    H/O prostatitis    Rotator cuff tear  &#x27;95 and &#x27;96   Left         Right 2011    Pilonidal cyst  &#x27; 87    DVT (deep venous thrombosis)  5/2017    One day post-op for right Knee Arthroscopy. Currently on xarelto    Degenerative lumbar spinal stenosis    Paroxysmal a-fib  &#x27;2010 and &#x27;2012    Two episodes    Status post arthroscopy of hip  left 2/2020    H/O cervical spine surgery  1991    History of hydrocelectomy    S/P rotator cuff repair  left   95 and &#x27;96        right 2017    Status post colonoscopy  &#x27; 2015   negative    S/P right knee arthroscopy  5/2017    Status post laminectomy with spinal fusion  lumbar L5-S1    History of excision of pilonidal cyst  &#x27; 87      MEDICATIONS  (STANDING):  acetaminophen  IVPB .. 1000 milliGRAM(s) IV Intermittent every 6 hours  ceFAZolin   IVPB 2000 milliGRAM(s) IV Intermittent every 8 hours  doxazosin 8 milliGRAM(s) Oral at bedtime  lactated ringers. 1000 milliLiter(s) (75 mL/Hr) IV Continuous <Continuous>  lactated ringers. 1000 milliLiter(s) (125 mL/Hr) IV Continuous <Continuous>  pantoprazole    Tablet 40 milliGRAM(s) Oral before breakfast  senna 2 Tablet(s) Oral at bedtime  tranexamic acid 4% Topical Irrigation 1 Application(s) IntraArticular once    MEDICATIONS  (PRN):  aluminum hydroxide/magnesium hydroxide/simethicone Suspension 30 milliLiter(s) Oral four times a day PRN Indigestion  HYDROmorphone  Injectable 0.5 milliGRAM(s) IV Push every 10 minutes PRN Moderate Pain (4 - 6)  HYDROmorphone  Injectable 0.5 milliGRAM(s) IV Push every 3 hours PRN breakthrough  ondansetron Injectable 4 milliGRAM(s) IV Push every 6 hours PRN Nausea and/or Vomiting  oxyCODONE    IR 5 milliGRAM(s) Oral every 3 hours PRN Moderate Pain (4 - 6)  oxyCODONE    IR 10 milliGRAM(s) Oral every 3 hours PRN Severe Pain (7 - 10)  polyethylene glycol 3350 17 Gram(s) Oral daily PRN Constipation      Allergies    No Known Allergies    Intolerances    SOCIAL HISTORY:  Smoker:  YES / NO        PACK YEARS:                         WHEN QUIT?  ETOH use:  YES / NO               FREQUENCY / QUANTITY:  Ilicit Drug use:  YES / NO  Occupation:  Assisted device use (Cane / Walker):  Live with:      FAMILY HISTORY:  Family history of stroke (Sibling)    Melanoma (Father)        Vital Signs Last 24 Hrs  T(C): 36.4 (30 Nov 2020 11:50), Max: 36.9 (30 Nov 2020 10:10)  T(F): 97.5 (30 Nov 2020 11:50), Max: 98.4 (30 Nov 2020 10:10)  HR: 71 (30 Nov 2020 11:50) (63 - 84)  BP: 97/57 (30 Nov 2020 11:50) (97/57 - 152/74)  BP(mean): --  RR: 18 (30 Nov 2020 11:50) (12 - 18)  SpO2: 100% (30 Nov 2020 11:50) (98% - 100%)            LABS:          PT/INR - ( 30 Nov 2020 06:27 )   PT: 12.2 sec;   INR: 1.01 ratio         PTT - ( 30 Nov 2020 06:27 )  PTT:31.4 sec    CAPILLARY BLOOD GLUCOSE          RADIOLOGY & ADDITIONAL STUDIES: Patient is a 65y old  Male who presents with a chief complaint of     66 yo male reports 2 year history of left hip pain that increases when standing up from a seated position and extended ambulation.  No current analgesics.  HPI:  Patient is seen and examined.  Pain is controlled.      PAST MEDICAL & SURGICAL HISTORY:  BPH (benign prostatic hyperplasia)    Valvular heart disease    HLD (hyperlipidemia)    H/O prostatitis    Rotator cuff tear  &#x27;95 and &#x27;96   Left         Right 2011    Pilonidal cyst  &#x27; 87    DVT (deep venous thrombosis)  5/2017    One day post-op for right Knee Arthroscopy. Currently on xarelto    Degenerative lumbar spinal stenosis    Paroxysmal a-fib  &#x27;2010 and &#x27;2012    Two episodes    Status post arthroscopy of hip  left 2/2020    H/O cervical spine surgery  1991    History of hydrocelectomy    S/P rotator cuff repair  left   95 and &#x27;96        right 2017    Status post colonoscopy  &#x27; 2015   negative    S/P right knee arthroscopy  5/2017    Status post laminectomy with spinal fusion  lumbar L5-S1    History of excision of pilonidal cyst  &#x27; 87      MEDICATIONS  (STANDING):  acetaminophen  IVPB .. 1000 milliGRAM(s) IV Intermittent every 6 hours  ceFAZolin   IVPB 2000 milliGRAM(s) IV Intermittent every 8 hours  doxazosin 8 milliGRAM(s) Oral at bedtime  lactated ringers. 1000 milliLiter(s) (75 mL/Hr) IV Continuous <Continuous>  lactated ringers. 1000 milliLiter(s) (125 mL/Hr) IV Continuous <Continuous>  pantoprazole    Tablet 40 milliGRAM(s) Oral before breakfast  senna 2 Tablet(s) Oral at bedtime  tranexamic acid 4% Topical Irrigation 1 Application(s) IntraArticular once    MEDICATIONS  (PRN):  aluminum hydroxide/magnesium hydroxide/simethicone Suspension 30 milliLiter(s) Oral four times a day PRN Indigestion  HYDROmorphone  Injectable 0.5 milliGRAM(s) IV Push every 10 minutes PRN Moderate Pain (4 - 6)  HYDROmorphone  Injectable 0.5 milliGRAM(s) IV Push every 3 hours PRN breakthrough  ondansetron Injectable 4 milliGRAM(s) IV Push every 6 hours PRN Nausea and/or Vomiting  oxyCODONE    IR 5 milliGRAM(s) Oral every 3 hours PRN Moderate Pain (4 - 6)  oxyCODONE    IR 10 milliGRAM(s) Oral every 3 hours PRN Severe Pain (7 - 10)  polyethylene glycol 3350 17 Gram(s) Oral daily PRN Constipation      Allergies    No Known Allergies    Intolerances    SOCIAL HISTORY:  Smoker:  NO        PACK YEARS:                         WHEN QUIT?  ETOH use:   NO               FREQUENCY / QUANTITY:  Ilicit Drug use:  NO    FAMILY HISTORY:  Family history of stroke (Sibling)    Melanoma (Father)        Vital Signs Last 24 Hrs  T(C): 36.4 (30 Nov 2020 11:50), Max: 36.9 (30 Nov 2020 10:10)  T(F): 97.5 (30 Nov 2020 11:50), Max: 98.4 (30 Nov 2020 10:10)  HR: 71 (30 Nov 2020 11:50) (63 - 84)  BP: 97/57 (30 Nov 2020 11:50) (97/57 - 152/74)  BP(mean): --  RR: 18 (30 Nov 2020 11:50) (12 - 18)  SpO2: 100% (30 Nov 2020 11:50) (98% - 100%  LABS:    PT/INR - ( 30 Nov 2020 06:27 )   PT: 12.2 sec;   INR: 1.01 ratio         PTT - ( 30 Nov 2020 06:27 )  PTT:31.4 sec

## 2020-11-30 NOTE — DISCHARGE NOTE PROVIDER - PROVIDER TOKENS
PROVIDER:[TOKEN:[[id number]],SCHEDULEDAPPT:[12/17/2020],SCHEDULEDAPPTTIME:[11:00 AM],ESTABLISHEDPATIENT:[T]]

## 2020-11-30 NOTE — BRIEF OPERATIVE NOTE - NSICDXBRIEFPOSTOP_GEN_ALL_CORE_FT
POST-OP DIAGNOSIS:  Degenerative joint disease (DJD) of hip 30-Nov-2020 09:55:20 left Luis Angel Rice

## 2020-11-30 NOTE — DISCHARGE NOTE PROVIDER - CARE PROVIDER_API CALL
Angelina Fournier  NP IN ADULT HEALTH  [address], [address]  [place] [address]  Phone: [phone]  Fax: [phone]  Established Patient  Scheduled Appointment: 12/17/2020 11:00 AM

## 2020-11-30 NOTE — DISCHARGE NOTE PROVIDER - NSDCFUSCHEDAPPT_GEN_ALL_CORE_FT
MARE CHUNG ; 11/30/2020 ; [facility] [address] [facility]  MARE CHUNG ; 12/17/2020 ; [facility] [address] [facility]  MARE CHUNG ; 02/05/2021 ; [facility] [address] [facility] MARE CHUNG ; 12/17/2020 ; [facility] [address] [facility]  MARE CHUNG ; 02/05/2021 ; [facility] Orth[facility] [address] [facility]

## 2020-11-30 NOTE — PHYSICAL THERAPY INITIAL EVALUATION ADULT - GENERAL OBSERVATIONS, REHAB EVAL
Pt received supine in bed. All lines intact. Pt agreeable to physical therapy. + IV pink wedges placed to maintain neutral rotation of hip while pt supine in bed. +SCD

## 2020-11-30 NOTE — OCCUPATIONAL THERAPY INITIAL EVALUATION ADULT - ADDITIONAL COMMENTS
Pt lives with spouse in a private home 4-5 RACHELE no railing pt has a flight +railing to bedroom and bathroom  Pt has a commode, RW and SAC. OT educated pt on how to use a commode over the toilet v bedside

## 2020-11-30 NOTE — DISCHARGE NOTE PROVIDER - NSDCMRMEDTOKEN_GEN_ALL_CORE_FT
acetaminophen 500 mg oral tablet: 2 tab(s) orally every 8 hours  celecoxib 200 mg oral capsule: 1 cap orally every 12 hours.   doxazosin 8 mg oral tablet: 1 tab(s) orally once a day  Eliquis 2.5 mg oral tablet: 1 tab orally every 12 hours. Change back to home dose of Xarelto when Eliquis is completed.  magnesium carbonate 250 mg oral capsule: 1 tab(s) orally once a day  Multiple Vitamins oral tablet: 1 tab(s) orally once a day    mupirocin 2% topical ointment: 1 application in each nostril 2 times a day   omeprazole 20 mg oral delayed release capsule: 1 cap orally once a day   polyethylene glycol 3350 oral powder for reconstitution: 17 gram(s) orally once a day  potassium acid phosphate 500 mg oral tablet:   senna oral tablet: 2 tab(s) orally once a day (at bedtime)  Vitamin D3 1000 intl units (25 mcg) oral tablet: 1 tab(s) orally once a day  Xarelto 20 mg oral tablet: 1 tab(s) orally once a day   acetaminophen 500 mg oral tablet: 2 tab(s) orally every 8 hours  apixaban 5 mg oral tablet: 1 tab orally every 12 hours. Change to Xarelto once Eliquis is completed.  celecoxib 200 mg oral capsule: 1 cap orally every 12 hours.   doxazosin 8 mg oral tablet: 1 tab(s) orally once a day  Eliquis 2.5 mg oral tablet: 1 tab(s) orally once MDD:1   magnesium carbonate 250 mg oral capsule: 1 tab(s) orally once a day  Multiple Vitamins oral tablet: 1 tab(s) orally once a day    omeprazole 20 mg oral delayed release capsule: 1 cap orally once a day   oxyCODONE 5 mg oral tablet: 1 tab(s) orally every 3 hours, As Needed MDD:8   polyethylene glycol 3350 oral powder for reconstitution: 17 gram(s) orally once a day  potassium acid phosphate 500 mg oral tablet:   senna oral tablet: 2 tab(s) orally once a day (at bedtime)  Vitamin D3 1000 intl units (25 mcg) oral tablet: 1 tab(s) orally once a day  Xarelto 20 mg oral tablet: 1 tab(s) orally once a day.  resume once eliquis course is complete

## 2020-11-30 NOTE — DISCHARGE NOTE PROVIDER - NSDCCPTREATMENT_GEN_ALL_CORE_FT
PRINCIPAL PROCEDURE  Procedure: Total replacement of left hip joint by anterior approach  Findings and Treatment: Severe DJD left hip

## 2020-11-30 NOTE — CONSULT NOTE ADULT - ASSESSMENT
Aftercare following left THR 11/30/20    pain meds oxycodone and dilaudid. Monitor for respiratory depression and lethargy.  PT/OT.  DVT prophylaxis.  DVT ppx: [ ]ASA81 [ ] LNI511 [ ] Lovenox [ ] Coumadin   [ ] Eliquis [  ] Heparin  Dispo:     Home [ ]     Acute Rehab [ ]     WILLIAM [ ]     TBD [x ]    PAF/Dyslipidemia/valvular heart disease  Eliquis Aftercare following left THR 11/30/20    pain meds oxycodone and dilaudid. Monitor for respiratory depression and lethargy.  PT/OT.  DVT prophylaxis.  DVT ppx: [ ]ASA81 [ ] [id number] [ ] Lovenox [ ] Coumadin   [ ] Eliquis [  ] Heparin  Dispo:     Home [x ]     Acute Rehab [ ]     WILLIAM [ ]     TBD [ ]    PAF/Dyslipidemia/valvular heart disease  Eliquis    Plan of care was discuss with patient, all questions were answered, seems understand and in agreement.

## 2020-11-30 NOTE — DISCHARGE NOTE PROVIDER - HOSPITAL COURSE
This patient was admitted to [facility] with a history of severe degenerative joint disease of the left hip.  Patient underwent Pre-Surgical Testing and was medically cleared to undergo elective procedure. Patient underwent Left Anterior THR by Dr. Rufino Jensen on 11/30/20. Procedure was well tolerated.  No operative or gabriela-operative complications arose during patient's hospital course.  Patient received antibiotic according to SCIP guidelines for infection prevention.  Eliquis 2.5mg x 2 doses, then Eliquis 5mg q 12h was given for DVT prophylaxis, in addition to the use of SCDs.  Anesthesia, Medical Hospitalist, Physical Therapy and Occupational Therapy were consulted. Patient is stable for discharge with a good prognosis.  Appropriate discharge instructions and medications are provided in this document.

## 2020-11-30 NOTE — DISCHARGE NOTE NURSING/CASE MANAGEMENT/SOCIAL WORK - NSDCFUADDAPPT_GEN_ALL_CORE_FT
It is advisable to follow up with your primary care provider within the next 2-3 weeks to ensure your medications are appropriate and there are no underlying problems after your procedure.

## 2020-11-30 NOTE — OCCUPATIONAL THERAPY INITIAL EVALUATION ADULT - GENERAL OBSERVATIONS, REHAB EVAL
Pt found supine in bed +SCDs, IV, AUTUMN wound dressing, LE wedges for positioning, NCO2 Pt found supine in bed +SCDs, IV, LE wedges for positioning

## 2020-11-30 NOTE — DISCHARGE NOTE NURSING/CASE MANAGEMENT/SOCIAL WORK - NSSCNAMETXT_GEN_ALL_CORE
[facility] - Nurse to visit the day after hospital discharge; physical therapist to follow. Please contact the home care agency if you have not heard from them by 12 noon on the day after your hospital discharge.

## 2020-11-30 NOTE — OCCUPATIONAL THERAPY INITIAL EVALUATION ADULT - PRECAUTIONS/LIMITATIONS, REHAB EVAL
surgical precautions/fall precautions/No hyperextension, no extreme external rotation, no extreme abduction of operated LE.

## 2020-11-30 NOTE — DISCHARGE NOTE PROVIDER - NSDCACTIVITY_GEN_ALL_CORE
No heavy lifting/straining/Walking - Outdoors allowed/Stairs allowed/Walking - Indoors allowed/Showering allowed/Do not drive or operate machinery

## 2020-11-30 NOTE — DISCHARGE NOTE PROVIDER - NSDCCPCAREPLAN_GEN_ALL_CORE_FT
PRINCIPAL DISCHARGE DIAGNOSIS  Diagnosis: Primary osteoarthritis of left hip  Assessment and Plan of Treatment: You have had an Anterior Total Hip Replacement. Follow instructions given to you by your surgeon.   PT/OT Total Hip Protocol; Ambulation, transfers, stairs, & ADLs  Full weight bearing both legs; Walker/cane use as instructed by PT/OT  Anterior THR precautions for 4 weeks: No straight leg raise; No external rotation of hip when extended-standing or lying flat; No hyperextension of hip when standing (kickback)  • Ice 20 minutes several times daily with at least a 20 minute break in between icing sessions  Silverlon dressing is water-resistant, not waterproof.  You may shower, but do not aim shower stream at surgical site. Pat dry after shower.   Remove Silverlon dressing on postop day #7.   Keep incision clean. DO NOT APPLY ANYTHING to incision site (salves/ointments/creams).   Do not scrub incision site. Pat dry after shower.  Prineo tape removal on/near after Post Op Day # 14.  See PCP in office approximately 2-3 weeks from discharge for exam/labwork.

## 2020-11-30 NOTE — PHYSICAL THERAPY INITIAL EVALUATION ADULT - ADDITIONAL COMMENTS
Pt lives in private home with 4-5 RACHE[place] no HR and 1 flight inside +HR. Pt rec'd RW and Commode in preparation for surgery.

## 2020-11-30 NOTE — DISCHARGE NOTE NURSING/CASE MANAGEMENT/SOCIAL WORK - NSDCPEXARELTOCOMP_GEN_ALL_CORE
Rivaroxaban/Xarelto is used to treat and prevent blood clots.  If you are not able to swallow the tablets whole, you may crush the tablets and mix them with a small amount of applesauce and promptly take within four hours. Eat some food right after you swallow the mixture. Take 2.5mg or 10mg tablets of Rivaroxaban/Xarelto with or without food. Take 15mg or 20mg tablets of Rivaroxaban/Xarelto with food. Never skip a dose of Rivaroxaban/Xarelto.  If you take Rivaroxaban/Xarelto once a day and you forget to take your dose, take a dose as soon as you remember on the same day. If you take Rivaroxaban/Xarelto 2.5 mg twice a day and you forget to take your dose, skip the missed dose and take your next dose at your usual time. DO NOT take an extra pill to ‘catch up’. If you take Rivaroxaban/Xarelto 15 mg twice a day and you forget to take your dose, take a dose as soon as you remember on the same day. You may take 2 doses at the same time to make up for missed dose ONLY if you take a total of 30mg per day. Notify your doctor that you missed a dose. Take Rivaroxaban/Xarelto at the same time each morning and evening. Rivaroxaban/Xarelto may be taken with other medication or food.

## 2020-12-01 VITALS
SYSTOLIC BLOOD PRESSURE: 99 MMHG | TEMPERATURE: 98 F | HEART RATE: 58 BPM | OXYGEN SATURATION: 97 % | RESPIRATION RATE: 17 BRPM | DIASTOLIC BLOOD PRESSURE: 55 MMHG

## 2020-12-01 LAB
ANION GAP SERPL CALC-SCNC: 7 MMOL/L — SIGNIFICANT CHANGE UP (ref 5–17)
BUN SERPL-MCNC: 16 MG/DL — SIGNIFICANT CHANGE UP (ref 7–23)
CALCIUM SERPL-MCNC: 8.6 MG/DL — SIGNIFICANT CHANGE UP (ref 8.4–10.5)
CHLORIDE SERPL-SCNC: 103 MMOL/L — SIGNIFICANT CHANGE UP (ref 96–108)
CO2 SERPL-SCNC: 28 MMOL/L — SIGNIFICANT CHANGE UP (ref 22–31)
CREAT SERPL-MCNC: 1.05 MG/DL — SIGNIFICANT CHANGE UP (ref 0.5–1.3)
GLUCOSE SERPL-MCNC: 138 MG/DL — HIGH (ref 70–99)
HCT VFR BLD CALC: 32.5 % — LOW (ref 39–50)
HGB BLD-MCNC: 11.3 G/DL — LOW (ref 13–17)
MCHC RBC-ENTMCNC: 31.2 PG — SIGNIFICANT CHANGE UP (ref 27–34)
MCHC RBC-ENTMCNC: 34.8 GM/DL — SIGNIFICANT CHANGE UP (ref 32–36)
MCV RBC AUTO: 89.8 FL — SIGNIFICANT CHANGE UP (ref 80–100)
NRBC # BLD: 0 /100 WBCS — SIGNIFICANT CHANGE UP (ref 0–0)
PLATELET # BLD AUTO: 202 K/UL — SIGNIFICANT CHANGE UP (ref 150–400)
POTASSIUM SERPL-MCNC: 4.7 MMOL/L — SIGNIFICANT CHANGE UP (ref 3.5–5.3)
POTASSIUM SERPL-SCNC: 4.7 MMOL/L — SIGNIFICANT CHANGE UP (ref 3.5–5.3)
RBC # BLD: 3.62 M/UL — LOW (ref 4.2–5.8)
RBC # FLD: 12.5 % — SIGNIFICANT CHANGE UP (ref 10.3–14.5)
SODIUM SERPL-SCNC: 138 MMOL/L — SIGNIFICANT CHANGE UP (ref 135–145)
WBC # BLD: 14.12 K/UL — HIGH (ref 3.8–10.5)
WBC # FLD AUTO: 14.12 K/UL — HIGH (ref 3.8–10.5)

## 2020-12-01 PROCEDURE — 99233 SBSQ HOSP IP/OBS HIGH 50: CPT

## 2020-12-01 RX ORDER — OXYCODONE HYDROCHLORIDE 5 MG/1
1 TABLET ORAL
Qty: 56 | Refills: 0
Start: 2020-12-01 | End: 2020-12-07

## 2020-12-01 RX ORDER — CELECOXIB 200 MG/1
200 CAPSULE ORAL EVERY 12 HOURS
Refills: 0 | Status: DISCONTINUED | OUTPATIENT
Start: 2020-12-01 | End: 2020-12-01

## 2020-12-01 RX ORDER — RIVAROXABAN 15 MG-20MG
1 KIT ORAL
Qty: 0 | Refills: 0 | DISCHARGE

## 2020-12-01 RX ORDER — APIXABAN 2.5 MG/1
1 TABLET, FILM COATED ORAL
Qty: 1 | Refills: 0
Start: 2020-12-01 | End: 2020-12-01

## 2020-12-01 RX ADMIN — OXYCODONE HYDROCHLORIDE 5 MILLIGRAM(S): 5 TABLET ORAL at 10:21

## 2020-12-01 RX ADMIN — Medication 400 MILLIGRAM(S): at 02:29

## 2020-12-01 RX ADMIN — SENNA PLUS 2 TABLET(S): 8.6 TABLET ORAL at 06:07

## 2020-12-01 RX ADMIN — Medication 1000 MILLIGRAM(S): at 09:26

## 2020-12-01 RX ADMIN — OXYCODONE HYDROCHLORIDE 5 MILLIGRAM(S): 5 TABLET ORAL at 09:25

## 2020-12-01 RX ADMIN — SODIUM CHLORIDE 125 MILLILITER(S): 9 INJECTION, SOLUTION INTRAVENOUS at 06:07

## 2020-12-01 RX ADMIN — OXYCODONE HYDROCHLORIDE 10 MILLIGRAM(S): 5 TABLET ORAL at 01:00

## 2020-12-01 RX ADMIN — CELECOXIB 200 MILLIGRAM(S): 200 CAPSULE ORAL at 10:45

## 2020-12-01 RX ADMIN — Medication 1000 MILLIGRAM(S): at 09:25

## 2020-12-01 RX ADMIN — APIXABAN 2.5 MILLIGRAM(S): 2.5 TABLET, FILM COATED ORAL at 09:25

## 2020-12-01 RX ADMIN — OXYCODONE HYDROCHLORIDE 10 MILLIGRAM(S): 5 TABLET ORAL at 00:24

## 2020-12-01 RX ADMIN — PANTOPRAZOLE SODIUM 40 MILLIGRAM(S): 20 TABLET, DELAYED RELEASE ORAL at 06:06

## 2020-12-01 NOTE — PROGRESS NOTE ADULT - SUBJECTIVE AND OBJECTIVE BOX
Post Op     MARE CHUNG      65y        Male                                                                                                                 T(C): 36.7 (12-01-20 @ 07:30), Max: 37 (11-30-20 @ 23:06)  HR: 50 (12-01-20 @ 07:30) (50 - 84)  BP: 103/51 (12-01-20 @ 07:30) (92/53 - 125/65)  RR: 17 (12-01-20 @ 07:30) (12 - 18)  SpO2: 96% (12-01-20 @ 07:30) (96% - 100%)  Wt(kg): --    S/P   total hip replacement    Patient denies shortness of breath, chest pain, dyspnea, No complaints  Pain is 3 /10    Physical Exam    Extremity: Bilaterally:  No holmon                                           No Cord                                          PAS on b/l                                           Neurovascular intact                                          Motor intact EHL/FHL                                          Sensation intact                                          Pulses intact DP/PT                                         Calves Soft                                         Dressing Clean / Dry / Intact                                         Capillary refill with 5 seconds                          11.3   14.12 )-----------( 202      ( 01 Dec 2020 06:31 )             32.5       12-01    138  |  103  |  16  ----------------------------<  138<H>  4.7   |  28  |  1.05    Ca    8.6      01 Dec 2020 06:31        A/P  -- S/P total hip replacement anterior  left     -  Medicine To Follow   - DVT prophylaxis PAS eliquis 2.5mg po bid  x  1  day  then  eliquis  5mg po bid , finish course  and resume  xarelto , hx  of DVT  and  Paroxysmal a- fib  discussed with MARITO Ayala  will contact primary cardiologist  - PT & OT   - Analagesia  - Incentive Spirometry  - Discharge Planning  - Safety Precautions  -  CBC , BMP daily

## 2020-12-01 NOTE — PROGRESS NOTE ADULT - SUBJECTIVE AND OBJECTIVE BOX
Discharge medication calendar:  (Xarelto 20mg QPM)  Eliquis 2.5mg q12h POD1 then Eliquis 5mg q12h x 14 days then resume Xarelto 20mg QPM without interruption  APAP 1000mg q8h x 2-3 weeks  Celecoxib 200mg q12h x 21 days ONLY  Omeprazole 20mg QAM x 6 weeks  Narcotic PRN  Docusate 100mg TID while taking narcotic  Miralax, Senna, or Bisacodyl PRN for treatment of constipation

## 2020-12-01 NOTE — PROGRESS NOTE ADULT - SUBJECTIVE AND OBJECTIVE BOX
Patient is a 65y old  Male who presents with a chief complaint of Left Anterior THR for severe left hip OA (30 Nov 2020 12:35)      INTERVAL HPI/OVERNIGHT EVENTS:  Pt is seen and examined.  Pain is controlled.    Pain Location & Control:     MEDICATIONS  (STANDING):  acetaminophen   Tablet .. 1000 milliGRAM(s) Oral every 8 hours  apixaban 2.5 milliGRAM(s) Oral every 12 hours  celecoxib 200 milliGRAM(s) Oral every 12 hours  doxazosin 8 milliGRAM(s) Oral at bedtime  lactated ringers. 1000 milliLiter(s) (125 mL/Hr) IV Continuous <Continuous>  pantoprazole    Tablet 40 milliGRAM(s) Oral before breakfast  senna 2 Tablet(s) Oral at bedtime  tranexamic acid 4% Topical Irrigation 1 Application(s) IntraArticular once    MEDICATIONS  (PRN):  aluminum hydroxide/magnesium hydroxide/simethicone Suspension 30 milliLiter(s) Oral four times a day PRN Indigestion  HYDROmorphone  Injectable 0.5 milliGRAM(s) IV Push every 3 hours PRN breakthrough  ondansetron Injectable 4 milliGRAM(s) IV Push every 6 hours PRN Nausea and/or Vomiting  oxyCODONE    IR 5 milliGRAM(s) Oral every 3 hours PRN Moderate Pain (4 - 6)  oxyCODONE    IR 10 milliGRAM(s) Oral every 3 hours PRN Severe Pain (7 - 10)  polyethylene glycol 3350 17 Gram(s) Oral daily PRN Constipation      Allergies    No Known Allergies    Intolerances      Vital Signs Last 24 Hrs  T(C): 36.7 (01 Dec 2020 07:30), Max: 37 (30 Nov 2020 23:06)  T(F): 98 (01 Dec 2020 07:30), Max: 98.6 (30 Nov 2020 23:06)  HR: 66 (01 Dec 2020 09:00) (50 - 71)  BP: 129/64 (01 Dec 2020 09:00) (92/53 - 129/64)  BP(mean): --  RR: 17 (01 Dec 2020 07:30) (12 - 18)  SpO2: 96% (01 Dec 2020 09:00) (96% - 100%)        LABS:                        11.3   14.12 )-----------( 202      ( 01 Dec 2020 06:31 )             32.5     01 Dec 2020 06:31    138    |  103    |  16     ----------------------------<  138    4.7     |  28     |  1.05     Ca    8.6        01 Dec 2020 06:31      PT/INR - ( 30 Nov 2020 06:27 )   PT: 12.2 sec;   INR: 1.01 ratio         PTT - ( 30 Nov 2020 06:27 )  PTT:31.4 sec        Imaging Personally Reviewed:  [ ] YES  [ ] NO    Consultant(s) Notes Reviewed:  [ ] YES  [ ] NO    Care Discussed with Consultants/Other Providers [ x] YES  [ ] NO

## 2020-12-17 ENCOUNTER — APPOINTMENT (OUTPATIENT)
Dept: ORTHOPEDIC SURGERY | Facility: CLINIC | Age: 65
End: 2020-12-17

## 2020-12-18 ENCOUNTER — APPOINTMENT (OUTPATIENT)
Dept: ORTHOPEDIC SURGERY | Facility: CLINIC | Age: 65
End: 2020-12-18
Payer: MEDICARE

## 2020-12-18 VITALS
HEART RATE: 68 BPM | DIASTOLIC BLOOD PRESSURE: 76 MMHG | TEMPERATURE: 98.2 F | BODY MASS INDEX: 26.07 KG/M2 | SYSTOLIC BLOOD PRESSURE: 149 MMHG | HEIGHT: 68 IN | WEIGHT: 172 LBS

## 2020-12-18 PROCEDURE — 73502 X-RAY EXAM HIP UNI 2-3 VIEWS: CPT | Mod: LT

## 2020-12-18 PROCEDURE — 99024 POSTOP FOLLOW-UP VISIT: CPT

## 2020-12-28 PROCEDURE — U0003: CPT

## 2020-12-28 PROCEDURE — 97535 SELF CARE MNGMENT TRAINING: CPT

## 2020-12-28 PROCEDURE — 97165 OT EVAL LOW COMPLEX 30 MIN: CPT

## 2020-12-28 PROCEDURE — 86900 BLOOD TYPING SEROLOGIC ABO: CPT

## 2020-12-28 PROCEDURE — 88305 TISSUE EXAM BY PATHOLOGIST: CPT

## 2020-12-28 PROCEDURE — 85027 COMPLETE CBC AUTOMATED: CPT

## 2020-12-28 PROCEDURE — 85730 THROMBOPLASTIN TIME PARTIAL: CPT

## 2020-12-28 PROCEDURE — C1776: CPT

## 2020-12-28 PROCEDURE — 97116 GAIT TRAINING THERAPY: CPT

## 2020-12-28 PROCEDURE — 97530 THERAPEUTIC ACTIVITIES: CPT

## 2020-12-28 PROCEDURE — 85610 PROTHROMBIN TIME: CPT

## 2020-12-28 PROCEDURE — 36415 COLL VENOUS BLD VENIPUNCTURE: CPT

## 2020-12-28 PROCEDURE — 97161 PT EVAL LOW COMPLEX 20 MIN: CPT

## 2020-12-28 PROCEDURE — 76000 FLUOROSCOPY <1 HR PHYS/QHP: CPT

## 2020-12-28 PROCEDURE — 27130 TOTAL HIP ARTHROPLASTY: CPT

## 2020-12-28 PROCEDURE — 93005 ELECTROCARDIOGRAM TRACING: CPT

## 2020-12-28 PROCEDURE — 86850 RBC ANTIBODY SCREEN: CPT

## 2020-12-28 PROCEDURE — C1713: CPT

## 2020-12-28 PROCEDURE — 73501 X-RAY EXAM HIP UNI 1 VIEW: CPT

## 2020-12-28 PROCEDURE — 86901 BLOOD TYPING SEROLOGIC RH(D): CPT

## 2020-12-28 PROCEDURE — 88311 DECALCIFY TISSUE: CPT

## 2020-12-28 PROCEDURE — 80048 BASIC METABOLIC PNL TOTAL CA: CPT

## 2020-12-28 PROCEDURE — 97110 THERAPEUTIC EXERCISES: CPT

## 2021-01-01 NOTE — ED STATDOCS - CPE ED RESP NORM
There are no preventive care reminders to display for this patient.    Patient is up to date, no discussion needed.           normal...

## 2021-01-12 ENCOUNTER — APPOINTMENT (OUTPATIENT)
Dept: ORTHOPEDIC SURGERY | Facility: CLINIC | Age: 66
End: 2021-01-12
Payer: MEDICARE

## 2021-01-12 VITALS
BODY MASS INDEX: 26.83 KG/M2 | HEART RATE: 61 BPM | HEIGHT: 68 IN | SYSTOLIC BLOOD PRESSURE: 136 MMHG | TEMPERATURE: 97.5 F | WEIGHT: 177 LBS | DIASTOLIC BLOOD PRESSURE: 95 MMHG

## 2021-01-12 DIAGNOSIS — M75.102 UNSPECIFIED ROTATOR CUFF TEAR OR RUPTURE OF LEFT SHOULDER, NOT SPECIFIED AS TRAUMATIC: ICD-10-CM

## 2021-01-12 PROCEDURE — 99215 OFFICE O/P EST HI 40 MIN: CPT | Mod: 24

## 2021-01-13 ENCOUNTER — OUTPATIENT (OUTPATIENT)
Dept: OUTPATIENT SERVICES | Facility: HOSPITAL | Age: 66
LOS: 1 days | End: 2021-01-13
Payer: MEDICARE

## 2021-01-13 VITALS
RESPIRATION RATE: 11 BRPM | DIASTOLIC BLOOD PRESSURE: 80 MMHG | OXYGEN SATURATION: 97 % | WEIGHT: 179.02 LBS | HEIGHT: 68 IN | SYSTOLIC BLOOD PRESSURE: 127 MMHG | TEMPERATURE: 98 F | HEART RATE: 69 BPM

## 2021-01-13 DIAGNOSIS — Z98.890 OTHER SPECIFIED POSTPROCEDURAL STATES: Chronic | ICD-10-CM

## 2021-01-13 DIAGNOSIS — Z96.642 PRESENCE OF LEFT ARTIFICIAL HIP JOINT: Chronic | ICD-10-CM

## 2021-01-13 DIAGNOSIS — M75.102 UNSPECIFIED ROTATOR CUFF TEAR OR RUPTURE OF LEFT SHOULDER, NOT SPECIFIED AS TRAUMATIC: ICD-10-CM

## 2021-01-13 DIAGNOSIS — Z98.1 ARTHRODESIS STATUS: Chronic | ICD-10-CM

## 2021-01-13 DIAGNOSIS — Z01.818 ENCOUNTER FOR OTHER PREPROCEDURAL EXAMINATION: ICD-10-CM

## 2021-01-13 RX ORDER — POTASSIUM PHOSPHATE, MONOBASIC 500 MG/1
0 TABLET, SOLUBLE ORAL
Qty: 0 | Refills: 0 | DISCHARGE

## 2021-01-13 RX ORDER — MAGNESIUM CARBONATE 54 MG/5 ML
1 LIQUID (ML) ORAL
Qty: 0 | Refills: 0 | DISCHARGE

## 2021-01-13 NOTE — H&P PST ADULT - SOURCE OF INFORMATION, PROFILE
medical history obtained via telephone as per COVID protocol. physical exam to be done on DOS/patient

## 2021-01-13 NOTE — H&P PST ADULT - NSICDXPASTMEDICALHX_GEN_ALL_CORE_FT
PAST MEDICAL HISTORY:  BPH (benign prostatic hyperplasia)     Degenerative lumbar spinal stenosis     DVT (deep venous thrombosis) 5/2017    One day post-op for right Knee Arthroscopy. Currently on xarelto    HLD (hyperlipidemia)     Paroxysmal a-fib '2010 and '2012    Two episodes    Valvular heart disease

## 2021-01-13 NOTE — H&P PST ADULT - ASSESSMENT
64 y/o male with left shoulder rotator cuff tear     scheduled for left shoulder arthroscopy   medical/cardiac clearance   pre op instructions on wash and medications   will stop Xarelto  pre op  as per cardiologist   COVID testing on 1/20/21 at 8 am

## 2021-01-13 NOTE — H&P PST ADULT - HISTORY OF PRESENT ILLNESS
This is a 66 y/o male with 4 month history of progressively worsening  left shoulder pain and limited ROM . Reports constant pain especially at night and unable to abduct or raise the arm , MRI revealed rotator cuff tear  scheduled for left shoulder arthroscopy on 1/22/21

## 2021-01-13 NOTE — H&P PST ADULT - NSICDXPASTSURGICALHX_GEN_ALL_CORE_FT
PAST SURGICAL HISTORY:  H/O cervical spine surgery 1991 fusion    History of excision of pilonidal cyst ' 87    History of hydrocelectomy 2010    S/P arthroscopy of left shoulder 1996    S/P arthroscopy of right shoulder 2017    S/P right knee arthroscopy 5/2017    S/P rotator cuff repair left shoulder 1995    Status post arthroscopy of hip left 2/2020    Status post laminectomy with spinal fusion lumbar L5-S1 2018    Status post left hip replacement 11/2020

## 2021-01-20 ENCOUNTER — OUTPATIENT (OUTPATIENT)
Dept: OUTPATIENT SERVICES | Facility: HOSPITAL | Age: 66
LOS: 1 days | End: 2021-01-20
Payer: MEDICARE

## 2021-01-20 DIAGNOSIS — Z98.890 OTHER SPECIFIED POSTPROCEDURAL STATES: Chronic | ICD-10-CM

## 2021-01-20 DIAGNOSIS — Z96.642 PRESENCE OF LEFT ARTIFICIAL HIP JOINT: Chronic | ICD-10-CM

## 2021-01-20 DIAGNOSIS — Z98.1 ARTHRODESIS STATUS: Chronic | ICD-10-CM

## 2021-01-20 DIAGNOSIS — Z20.828 CONTACT WITH AND (SUSPECTED) EXPOSURE TO OTHER VIRAL COMMUNICABLE DISEASES: ICD-10-CM

## 2021-01-20 LAB — SARS-COV-2 RNA SPEC QL NAA+PROBE: SIGNIFICANT CHANGE UP

## 2021-01-20 PROCEDURE — U0003: CPT

## 2021-01-20 PROCEDURE — U0005: CPT

## 2021-01-21 ENCOUNTER — TRANSCRIPTION ENCOUNTER (OUTPATIENT)
Age: 66
End: 2021-01-21

## 2021-01-21 PROCEDURE — G0463: CPT

## 2021-01-21 NOTE — ASU PATIENT PROFILE, ADULT - PMH
BPH (benign prostatic hyperplasia)    Degenerative lumbar spinal stenosis    DVT (deep venous thrombosis)  5/2017    One day post-op for right Knee Arthroscopy. Currently on xarelto  HLD (hyperlipidemia)    Paroxysmal a-fib  '2010 and '2012    Two episodes  Valvular heart disease

## 2021-01-21 NOTE — ASU PATIENT PROFILE, ADULT - PSH
H/O cervical spine surgery  1991 fusion  History of excision of pilonidal cyst  ' 87  History of hydrocelectomy  2010  S/P arthroscopy of left shoulder  1996  S/P arthroscopy of right shoulder  2017  S/P right knee arthroscopy  5/2017  S/P rotator cuff repair  left shoulder 1995  Status post arthroscopy of hip  left 2/2020  Status post laminectomy with spinal fusion  lumbar L5-S1 2018  Status post left hip replacement  11/2020

## 2021-01-22 ENCOUNTER — OUTPATIENT (OUTPATIENT)
Dept: OUTPATIENT SERVICES | Facility: HOSPITAL | Age: 66
LOS: 1 days | End: 2021-01-22
Payer: MEDICARE

## 2021-01-22 ENCOUNTER — RESULT REVIEW (OUTPATIENT)
Age: 66
End: 2021-01-22

## 2021-01-22 ENCOUNTER — APPOINTMENT (OUTPATIENT)
Dept: ORTHOPEDIC SURGERY | Facility: HOSPITAL | Age: 66
End: 2021-01-22

## 2021-01-22 VITALS
RESPIRATION RATE: 18 BRPM | HEART RATE: 63 BPM | OXYGEN SATURATION: 99 % | SYSTOLIC BLOOD PRESSURE: 122 MMHG | DIASTOLIC BLOOD PRESSURE: 65 MMHG

## 2021-01-22 VITALS
RESPIRATION RATE: 11 BRPM | OXYGEN SATURATION: 97 % | DIASTOLIC BLOOD PRESSURE: 80 MMHG | WEIGHT: 179.02 LBS | TEMPERATURE: 98 F | SYSTOLIC BLOOD PRESSURE: 127 MMHG | HEART RATE: 69 BPM | HEIGHT: 68 IN

## 2021-01-22 DIAGNOSIS — Z98.890 OTHER SPECIFIED POSTPROCEDURAL STATES: Chronic | ICD-10-CM

## 2021-01-22 DIAGNOSIS — Z96.642 PRESENCE OF LEFT ARTIFICIAL HIP JOINT: Chronic | ICD-10-CM

## 2021-01-22 DIAGNOSIS — Z98.1 ARTHRODESIS STATUS: Chronic | ICD-10-CM

## 2021-01-22 DIAGNOSIS — M75.102 UNSPECIFIED ROTATOR CUFF TEAR OR RUPTURE OF LEFT SHOULDER, NOT SPECIFIED AS TRAUMATIC: ICD-10-CM

## 2021-01-22 PROCEDURE — 29826 SHO ARTHRS SRG DECOMPRESSION: CPT | Mod: 79,LT

## 2021-01-22 PROCEDURE — 29827 SHO ARTHRS SRG RT8TR CUF RPR: CPT | Mod: LT

## 2021-01-22 PROCEDURE — C1713: CPT

## 2021-01-22 PROCEDURE — 29999 UNLISTED PX ARTHROSCOPY: CPT

## 2021-01-22 PROCEDURE — 29826 SHO ARTHRS SRG DECOMPRESSION: CPT | Mod: LT

## 2021-01-22 PROCEDURE — 88304 TISSUE EXAM BY PATHOLOGIST: CPT

## 2021-01-22 PROCEDURE — 88304 TISSUE EXAM BY PATHOLOGIST: CPT | Mod: 26

## 2021-01-22 PROCEDURE — 29827 SHO ARTHRS SRG RT8TR CUF RPR: CPT | Mod: 79,LT

## 2021-01-22 RX ORDER — RIVAROXABAN 15 MG-20MG
1 KIT ORAL
Qty: 0 | Refills: 0 | DISCHARGE

## 2021-01-22 RX ORDER — MELOXICAM 15 MG/1
0 TABLET ORAL
Qty: 0 | Refills: 0 | DISCHARGE

## 2021-01-22 RX ORDER — CHLORHEXIDINE GLUCONATE 213 G/1000ML
1 SOLUTION TOPICAL ONCE
Refills: 0 | Status: COMPLETED | OUTPATIENT
Start: 2021-01-22 | End: 2021-01-22

## 2021-01-22 RX ORDER — OXYCODONE AND ACETAMINOPHEN 5; 325 MG/1; MG/1
1 TABLET ORAL
Qty: 30 | Refills: 0
Start: 2021-01-22 | End: 2021-01-26

## 2021-01-22 RX ORDER — SODIUM CHLORIDE 9 MG/ML
1000 INJECTION, SOLUTION INTRAVENOUS
Refills: 0 | Status: DISCONTINUED | OUTPATIENT
Start: 2021-01-22 | End: 2021-01-22

## 2021-01-22 RX ORDER — HYDROMORPHONE HYDROCHLORIDE 2 MG/ML
0.5 INJECTION INTRAMUSCULAR; INTRAVENOUS; SUBCUTANEOUS
Refills: 0 | Status: DISCONTINUED | OUTPATIENT
Start: 2021-01-22 | End: 2021-01-22

## 2021-01-22 RX ORDER — CEFAZOLIN SODIUM 1 G
2000 VIAL (EA) INJECTION ONCE
Refills: 0 | Status: COMPLETED | OUTPATIENT
Start: 2021-01-22 | End: 2021-01-22

## 2021-01-22 RX ORDER — DOXAZOSIN MESYLATE 4 MG
1 TABLET ORAL
Qty: 0 | Refills: 0 | DISCHARGE

## 2021-01-22 RX ORDER — OXYCODONE HYDROCHLORIDE 5 MG/1
5 TABLET ORAL ONCE
Refills: 0 | Status: DISCONTINUED | OUTPATIENT
Start: 2021-01-22 | End: 2021-01-22

## 2021-01-22 RX ORDER — ONDANSETRON 8 MG/1
4 TABLET, FILM COATED ORAL ONCE
Refills: 0 | Status: DISCONTINUED | OUTPATIENT
Start: 2021-01-22 | End: 2021-01-22

## 2021-01-22 RX ORDER — CHOLECALCIFEROL (VITAMIN D3) 125 MCG
1 CAPSULE ORAL
Qty: 0 | Refills: 0 | DISCHARGE

## 2021-01-22 RX ADMIN — SODIUM CHLORIDE 75 MILLILITER(S): 9 INJECTION, SOLUTION INTRAVENOUS at 12:30

## 2021-01-22 RX ADMIN — CHLORHEXIDINE GLUCONATE 1 APPLICATION(S): 213 SOLUTION TOPICAL at 09:15

## 2021-01-22 NOTE — ASU DISCHARGE PLAN (ADULT/PEDIATRIC) - CARE PROVIDER_API CALL
Flip Stafford)  Orthopaedic Surgery  [address] [facility], [facility] [address]  [place] [address]  Phone: [phone]  Fax: [phone]  Follow Up Time:

## 2021-01-22 NOTE — ASU DISCHARGE PLAN (ADULT/PEDIATRIC) - ASU DC SPECIAL INSTRUCTIONSFT
FOLLOW enclosed shoulder arthroscopy brochure.  Call MD for severe pain/fever/chills  Ice to shoulder 20 minutes on and off the first 48 hours after surgery to help decrease pain/swelling  Keep shoulder elevated in sling at all times.  Keep your shoulder close to your body and do not externally rotate shoulder in order to preserve rotator cuff repair.  No heavy lifting/pushing/pulling/twisting/weight bearing with shoulder.  Pump fists 10x an hour to help with circulation  Your most comfortable sleeping position will be propped up  Pain medication as needed, take a stool softener like senna or colace to decrease constipation

## 2021-01-22 NOTE — ASU DISCHARGE PLAN (ADULT/PEDIATRIC) - CALL YOUR DOCTOR IF YOU HAVE ANY OF THE FOLLOWING:
Bleeding that does not stop/Fever greater than (need to indicate Fahrenheit or Celsius)/Wound/Surgical Site with redness, or foul smelling discharge or pus/Numbness, tingling, color or temperature change to extremity Bleeding that does not stop/Swelling that gets worse/Pain not relieved by Medications/Fever greater than (need to indicate Fahrenheit or Celsius)/Wound/Surgical Site with redness, or foul smelling discharge or pus/Numbness, tingling, color or temperature change to extremity

## 2021-01-26 LAB — SURGICAL PATHOLOGY STUDY: SIGNIFICANT CHANGE UP

## 2021-02-03 ENCOUNTER — APPOINTMENT (OUTPATIENT)
Dept: ORTHOPEDIC SURGERY | Facility: CLINIC | Age: 66
End: 2021-02-03
Payer: MEDICARE

## 2021-02-03 VITALS — BODY MASS INDEX: 26.83 KG/M2 | TEMPERATURE: 97.1 F | HEIGHT: 68 IN | WEIGHT: 177 LBS

## 2021-02-03 PROCEDURE — 73030 X-RAY EXAM OF SHOULDER: CPT | Mod: LT

## 2021-02-03 PROCEDURE — 99024 POSTOP FOLLOW-UP VISIT: CPT

## 2021-02-05 ENCOUNTER — APPOINTMENT (OUTPATIENT)
Dept: ORTHOPEDIC SURGERY | Facility: CLINIC | Age: 66
End: 2021-02-05
Payer: MEDICARE

## 2021-02-05 VITALS
BODY MASS INDEX: 26.83 KG/M2 | DIASTOLIC BLOOD PRESSURE: 86 MMHG | WEIGHT: 177 LBS | HEART RATE: 72 BPM | HEIGHT: 68 IN | SYSTOLIC BLOOD PRESSURE: 133 MMHG | TEMPERATURE: 97.7 F

## 2021-02-05 DIAGNOSIS — Z96.642 PRESENCE OF LEFT ARTIFICIAL HIP JOINT: ICD-10-CM

## 2021-02-05 PROCEDURE — 99024 POSTOP FOLLOW-UP VISIT: CPT

## 2021-02-05 PROCEDURE — 73502 X-RAY EXAM HIP UNI 2-3 VIEWS: CPT | Mod: LT

## 2021-02-05 RX ORDER — OXYCODONE AND ACETAMINOPHEN 7.5; 325 MG/1; MG/1
7.5-325 TABLET ORAL
Qty: 30 | Refills: 0 | Status: ACTIVE | COMMUNITY
Start: 2021-01-22

## 2021-03-03 ENCOUNTER — APPOINTMENT (OUTPATIENT)
Dept: ORTHOPEDIC SURGERY | Facility: CLINIC | Age: 66
End: 2021-03-03
Payer: MEDICARE

## 2021-03-03 VITALS — TEMPERATURE: 98.1 F | HEIGHT: 68 IN | WEIGHT: 177 LBS | BODY MASS INDEX: 26.83 KG/M2

## 2021-03-03 PROCEDURE — 99024 POSTOP FOLLOW-UP VISIT: CPT

## 2021-04-14 ENCOUNTER — APPOINTMENT (OUTPATIENT)
Dept: ORTHOPEDIC SURGERY | Facility: CLINIC | Age: 66
End: 2021-04-14
Payer: MEDICARE

## 2021-04-14 VITALS — WEIGHT: 177 LBS | TEMPERATURE: 97.8 F | BODY MASS INDEX: 26.83 KG/M2 | HEIGHT: 68 IN

## 2021-04-14 PROCEDURE — 99024 POSTOP FOLLOW-UP VISIT: CPT

## 2021-05-26 ENCOUNTER — APPOINTMENT (OUTPATIENT)
Dept: ORTHOPEDIC SURGERY | Facility: CLINIC | Age: 66
End: 2021-05-26
Payer: MEDICARE

## 2021-05-26 VITALS — BODY MASS INDEX: 26.83 KG/M2 | HEIGHT: 68 IN | TEMPERATURE: 97.2 F | WEIGHT: 177 LBS

## 2021-05-26 DIAGNOSIS — Z98.890 OTHER SPECIFIED POSTPROCEDURAL STATES: ICD-10-CM

## 2021-05-26 PROCEDURE — 99213 OFFICE O/P EST LOW 20 MIN: CPT

## 2021-06-21 ENCOUNTER — RX RENEWAL (OUTPATIENT)
Age: 66
End: 2021-06-21

## 2021-12-21 RX ORDER — AMOXICILLIN 500 MG/1
500 CAPSULE ORAL
Qty: 12 | Refills: 2 | Status: ACTIVE | COMMUNITY
Start: 2021-12-21 | End: 1900-01-01

## 2022-01-25 ENCOUNTER — NON-APPOINTMENT (OUTPATIENT)
Age: 67
End: 2022-01-25

## 2022-01-31 ENCOUNTER — OUTPATIENT (OUTPATIENT)
Dept: OUTPATIENT SERVICES | Facility: HOSPITAL | Age: 67
LOS: 1 days | End: 2022-01-31
Payer: MEDICARE

## 2022-01-31 ENCOUNTER — APPOINTMENT (OUTPATIENT)
Dept: NEUROSURGERY | Facility: CLINIC | Age: 67
End: 2022-01-31
Payer: MEDICARE

## 2022-01-31 ENCOUNTER — APPOINTMENT (OUTPATIENT)
Dept: RADIOLOGY | Facility: CLINIC | Age: 67
End: 2022-01-31
Payer: MEDICARE

## 2022-01-31 VITALS
TEMPERATURE: 97.1 F | SYSTOLIC BLOOD PRESSURE: 147 MMHG | WEIGHT: 175 LBS | OXYGEN SATURATION: 99 % | BODY MASS INDEX: 26.52 KG/M2 | HEIGHT: 68 IN | HEART RATE: 72 BPM | DIASTOLIC BLOOD PRESSURE: 85 MMHG

## 2022-01-31 DIAGNOSIS — Z98.890 OTHER SPECIFIED POSTPROCEDURAL STATES: Chronic | ICD-10-CM

## 2022-01-31 DIAGNOSIS — M54.2 CERVICALGIA: ICD-10-CM

## 2022-01-31 DIAGNOSIS — Z98.1 ARTHRODESIS STATUS: ICD-10-CM

## 2022-01-31 DIAGNOSIS — Z96.642 PRESENCE OF LEFT ARTIFICIAL HIP JOINT: Chronic | ICD-10-CM

## 2022-01-31 DIAGNOSIS — Z98.1 ARTHRODESIS STATUS: Chronic | ICD-10-CM

## 2022-01-31 PROCEDURE — 99214 OFFICE O/P EST MOD 30 MIN: CPT

## 2022-01-31 PROCEDURE — 72052 X-RAY EXAM NECK SPINE 6/>VWS: CPT

## 2022-01-31 PROCEDURE — 72052 X-RAY EXAM NECK SPINE 6/>VWS: CPT | Mod: 26

## 2022-01-31 RX ORDER — TIZANIDINE HYDROCHLORIDE 2 MG/1
2 CAPSULE ORAL EVERY 8 HOURS
Qty: 15 | Refills: 0 | Status: ACTIVE | COMMUNITY
Start: 2022-01-31 | End: 1900-01-01

## 2022-01-31 RX ORDER — EZETIMIBE 10 MG/1
10 TABLET ORAL
Qty: 90 | Refills: 0 | Status: ACTIVE | COMMUNITY
Start: 2021-09-15

## 2022-01-31 RX ORDER — DOXAZOSIN 8 MG/1
8 TABLET ORAL
Qty: 90 | Refills: 0 | Status: ACTIVE | COMMUNITY
Start: 2022-01-27

## 2022-01-31 NOTE — CONSULT LETTER
[Dear  ___] : Dear  [unfilled], [Courtesy Letter:] : I had the pleasure of seeing your patient, [unfilled], in my office today. [Sincerely,] : Sincerely, [FreeTextEntry2] : Keren Odonnell MD\par [address], \[place] [address]  [FreeTextEntry1] : Senthil Luther is a 66-year-old male who returns today with cervical symptoms.  As you may recall patient had underwent L5-S1 lumbar fusion in 2019.  Patient also with history of C6-C7 fusion and discectomy 199.  Patient reports approximately 1 month ago after playing guitar he had developed 6 out of 10 sharp, stabbing, and aching right-sided neck pain with radiation into his right shoulder region.  Patient reports a one-time episode of tingling to the third and fourth digit of his right hand and an ache in his right tricep.  Patient denies any upper extremity shooting pains.  Patient denies any dexterity difficulties, upper extremity weakness, dropping of objects from his hands, or walking difficulties.  Patient with history of hypertrophic prostate on medication.  He denies any bowel or bladder incontinence.  \par \par Patient reports home exercises including traction provide him with no relief.  Oxycodone provides him with no relief.  Patient is unable to take NSAIDs due to Xarelto.  Patient has attempted Robaxin and Flexeril however unable to continue due to side effects.\par \par There is no imaging to review with the patient.\par \par Patient is alert and oriented.  No distress noted.  Strength to bilateral upper extremities 5/5.  Negative Mel.  Present and equal bilateral upper extremity reflexes.\par \par I provided the patient with a prescription for an x-ray of the cervical spine.  I provided patient with a referral for physical therapy.  Provided patient with a prescription for tizanidine.  Side effects reviewed with patient.  We will follow-up in 6 to 8 weeks to evaluate for progression.  Patient aware to call with any further questions or concerns or with any new or worsening symptoms. [FreeTextEntry3] : Queta Weems, MSN, FNP-BC\par Department of Neurosurgery \par [facility]\par [address] [place] [address], 2nd floor\[place] [address]\par Office: [phone]\par Fax: [phone]\par

## 2022-02-07 RX ORDER — TIZANIDINE 2 MG/1
2 TABLET ORAL EVERY 8 HOURS
Qty: 21 | Refills: 0 | Status: ACTIVE | COMMUNITY
Start: 2022-02-07 | End: 1900-01-01

## 2022-03-14 ENCOUNTER — APPOINTMENT (OUTPATIENT)
Dept: NEUROSURGERY | Facility: CLINIC | Age: 67
End: 2022-03-14

## 2022-05-16 NOTE — H&P PST ADULT - PAIN, FACTORS THAT RELIEVE, PROFILE
durable medical equipment  SENT EMAIL TO [facility] TO SEE IF WE COULD BE LINKED TO PT CURRENT DEVICE OR AT LEAST GET AGE OF DEVICE.  THERE WAS AN [facility] STICKER ON THE MACHINE   repositioning/medications

## 2022-05-26 ENCOUNTER — APPOINTMENT (OUTPATIENT)
Dept: ORTHOPEDIC SURGERY | Facility: CLINIC | Age: 67
End: 2022-05-26
Payer: MEDICARE

## 2022-05-26 VITALS
SYSTOLIC BLOOD PRESSURE: 167 MMHG | HEART RATE: 68 BPM | DIASTOLIC BLOOD PRESSURE: 76 MMHG | BODY MASS INDEX: 26.52 KG/M2 | HEIGHT: 68 IN | WEIGHT: 175 LBS

## 2022-05-26 DIAGNOSIS — S64.32XD: ICD-10-CM

## 2022-05-26 DIAGNOSIS — M79.642 PAIN IN LEFT HAND: ICD-10-CM

## 2022-05-26 DIAGNOSIS — Z78.9 OTHER SPECIFIED HEALTH STATUS: ICD-10-CM

## 2022-05-26 DIAGNOSIS — M18.12 UNILATERAL PRIMARY OSTEOARTHRITIS OF FIRST CARPOMETACARPAL JOINT, LEFT HAND: ICD-10-CM

## 2022-05-26 DIAGNOSIS — M18.11 UNILATERAL PRIMARY OSTEOARTHRITIS OF FIRST CARPOMETACARPAL JOINT, RIGHT HAND: ICD-10-CM

## 2022-05-26 PROCEDURE — 99214 OFFICE O/P EST MOD 30 MIN: CPT

## 2022-05-28 PROBLEM — M79.642 LEFT HAND PAIN: Status: ACTIVE | Noted: 2022-05-28

## 2022-05-28 PROBLEM — Z78.9 NON-SMOKER: Status: ACTIVE | Noted: 2022-05-28

## 2022-05-28 NOTE — HISTORY OF PRESENT ILLNESS
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.\par Patient has past history of left shoulder arthroscopic rotator cuff repair January 2022.\par History of previous spine surgery, L5/S1 MIS TLIF 8/20/2019.\par Hip replacement.\par \par TODAY:\par Pt plays guitar considerable time.\par Pt has pain ulnar side of thumb radiating distally and proximally.\par Patient seen many years ago was noted to have left thumb basal joint osteoarthritis.\par Although that was pre-existing the patient's primary pain is at the ulnar base of left thumb MP joint\par Patient perceives pain when the ulnar portion of the thumb presses against the fret board when playing guitar\par Patient not having paresthesias.\par

## 2022-05-28 NOTE — PHYSICAL EXAM
[de-identified] : Left thumb\par Basal joint manipulation 1+ to 2+ crepitus with 2-3+ pain\par 1+ laxity manipulation recreates patient basal joint complaint\par Left thumb MP joint\par Ulnar digital nerve is palpable\par Manipulation of ulnar digital nerve causes local discomfort\par Local discomfort seems to recreate patient complaint in that area\par No paresthesias or altered sensation corresponding to UDN\par RDN normal\par MP extension/flexion 0/30 degrees\par MP joint itself has no pain crepitus and has good stability\par No pertinent MP, PIP, or DIP joint contributory findings, except some Heberden's nodes; none are clinically painful.\par No A1 pulley tenderness and no triggering in any finger.\par \par Right basal joint\par 1-2+ crepitus, 2+ pain\par Right thumb MP joint hinges similar to left 0/30 degrees\par No A1 pulley tenderness and no triggering in any finger.\par No pertinent MP, PIP, or DIP joint contributory findings, except some Heberden's nodes; none are clinically painful.\par \par Neurologic\par Sensation in the ulnar digital nerve distribution of the left thumb is intact.\par Palpation ulnar digital nerve at the area of enlargement\par Negative Tinel's sign of ulnar digital nerve.\par Sensation otherwise intact.\par Median, ulnar, radial motor and sensory otherwise intact.\par Skin: No cyanosis, clubbing, edema or rashes.\par Vascular: Radial pulses intact.\par Lymphatic: No streaking or epitrochlear adenopathy.\par The patient is awake, alert, and oriented. Affect appropriate. Cooperative.

## 2022-05-28 NOTE — DISCUSSION/SUMMARY
[FreeTextEntry1] : Patient has pain playing guitar.  Left hand and thumb pain.\par Pain is a result of the basal joint arthritis as well as from irritation of ulnar digital nerve left thumb.  Patient describes his technique holding the neck of the guitar leaning the base of his thumb against the neck of the guitar.  Patient states that professional guitar players do not without aspect of the thumb directly on the neck but rather leave a gap between the base of the thumb and the neck of the [place] apparently to avoid direct contact.  Patient does not have paresthesias but the tenderness is due to irritation of the ulnar digital nerve equivalent to Hondo's thumb or neuroma in continuity of ulnar digital nerve.  I have explained this to patient.  As treatment the patient is advised to modify activities and avoid direct pressure on the base of the thumb against the guitar.  Patient could use a pad at the base of the thumb such as Dr. Flynn's foot pad in the shape of the letter "O" and placed the open space over the UDN neuroma.  Patient accepts these recommendations.\par Regarding the basal joint arthritis patient could apply diclofenac gel directly on the basal joint.  Techniques for doing so explained and discussed.  Patient declines thermoplastic HMTS.  Patient is currently taking Xarelto and therefore cannot take NSAID medication p.o. because of the antiplatelet effect.\par If basal joint pain is severe then the patient should return for reevaluation.  An intra-articular steroid injection could be done if pain is severe.  If patient can tolerate or adapt to the discomfort no treatment required.\par Prognosis limited.\par A lengthy and detailed discussion was held with the patient regarding analysis, treatment, and recommendations. All questions have been answered. At the conclusion the patient expressed acceptance, understanding and agreement with the plan.

## 2022-06-06 ENCOUNTER — RX RENEWAL (OUTPATIENT)
Age: 67
End: 2022-06-06

## 2022-06-06 RX ORDER — MELOXICAM 15 MG/1
15 TABLET ORAL DAILY
Qty: 30 | Refills: 1 | Status: ACTIVE | COMMUNITY
Start: 2020-12-24 | End: 1900-01-01

## 2022-07-06 ENCOUNTER — APPOINTMENT (OUTPATIENT)
Dept: ORTHOPEDIC SURGERY | Facility: CLINIC | Age: 67
End: 2022-07-06

## 2022-07-06 VITALS — SYSTOLIC BLOOD PRESSURE: 146 MMHG | HEART RATE: 54 BPM | DIASTOLIC BLOOD PRESSURE: 73 MMHG

## 2022-07-06 DIAGNOSIS — M76.891 OTHER SPECIFIED ENTHESOPATHIES OF RIGHT LOWER LIMB, EXCLUDING FOOT: ICD-10-CM

## 2022-07-06 DIAGNOSIS — M25.561 PAIN IN RIGHT KNEE: ICD-10-CM

## 2022-07-06 PROCEDURE — 73564 X-RAY EXAM KNEE 4 OR MORE: CPT | Mod: RT

## 2022-07-06 PROCEDURE — 99214 OFFICE O/P EST MOD 30 MIN: CPT

## 2022-08-29 ENCOUNTER — NON-APPOINTMENT (OUTPATIENT)
Age: 67
End: 2022-08-29

## 2022-09-01 ENCOUNTER — APPOINTMENT (OUTPATIENT)
Dept: NEUROSURGERY | Facility: CLINIC | Age: 67
End: 2022-09-01

## 2022-09-01 VITALS
SYSTOLIC BLOOD PRESSURE: 103 MMHG | DIASTOLIC BLOOD PRESSURE: 67 MMHG | BODY MASS INDEX: 27.28 KG/M2 | HEART RATE: 72 BPM | OXYGEN SATURATION: 97 % | WEIGHT: 180 LBS | TEMPERATURE: 96.7 F | HEIGHT: 68 IN

## 2022-09-01 DIAGNOSIS — M43.17 SPONDYLOLISTHESIS, LUMBOSACRAL REGION: ICD-10-CM

## 2022-09-01 DIAGNOSIS — M47.816 SPONDYLOSIS W/OUT MYELOPATHY OR RADICULOPATHY, LUMBAR REGION: ICD-10-CM

## 2022-09-01 PROCEDURE — 99214 OFFICE O/P EST MOD 30 MIN: CPT

## 2022-09-01 NOTE — CONSULT LETTER
[Dear  ___] : Dear  [unfilled], [Sincerely,] : Sincerely, [FreeTextEntry2] : Keren Odonnell MD\par [address], \[place] [address]  [FreeTextEntry1] : I have seen your patient Senthil Luther in my office to evaluate his left-sided hip and SI joint pain that has developed since undergoing lumbar surgery. 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 He is very athletic and active. He had a significant spondylolisthesis at L5-S1 with associated degenerative disc and facet arthropathy causing stenosis at the affected level and left-sided radiculopathy. The patient underwent a minimally invasive TLIF procedure at the L5-S1 level on August 20, 2019 without complication. The patient indicated that his previous mechanical back pain and left-sided sciatica resolved completely. The patient was advancing physical therapy and was able to return to his personal working out at the gym. Just over 4 weeks ago the patient reports doing stretching and exercising when he developed increased pain in the SI joint hip region and radiating into the groin. It is now one month since I saw the patient previously to evaluate this new symptom. His previous exam did not correlate with a new problem at the site of surgery or adjacent segment. The patient has now undergone an MRI scan of the pelvis and hip region and he is undergone a trial of physical therapy.\par \par The patient indicates that the physical therapy intervention focusing on the hip has not made any significant difference. In fact he has had more pain. He has sharp and stabbing pain with standing and changing posture especially from the sitting to standing position or whenever bending. Walking is sharply painful and stairs are very painful. There are no crossover symptoms to the right leg. There are no symptoms suspicious for recurrent sciatica. He denies any weakness or numbness in the distal leg. The patient is currently taking intermittent oxycodone, Tylenol, and cyclobenzaprine.\par \par I have reviewed with the patient his recent MRI scan. Of significance to his current symptom profile there is evidence of an anterior labral tear. There is otherwise no signs of any joint effusion or bursitis. An incidental observation of an extremely enlarged prostate gland is noticed. The patient was informed of this and has been following with urology. He denies any current urinary symptoms or flank pain to suggest kidney stones or other problems such as prostatitis.\par \par There has been no significant change in the patient's neurologic exam since her previous assessment. The patient has a pain with left hip joint range of motion especially flexion and external rotation. There is some pain to palpation over the greater trochanter. The patellar and ankle reflexes are symmetric and normal. There is no clonus. The patient has good strength of plantar and dorsiflexion especially on the left. Straight leg raise is negative with the exception of a focused hip pain.\par \par I have reviewed with the patient the differential diagnosis of his current pain. It is my feeling based on the postoperative imaging of the fusion and the current physical examination that this is primarily a new left hip injury versus a problem with the recent surgical site. I have therefore made a recommendation that the patient be seen by an orthopedic specialist with expertise in hip pathology to evaluate his condition. The patient indicates that he has a physician that he has worked with in the past for sports related injuries. The patient was reassured by the imaging and examination but there is no problem with his lumbosacral decompression and fusion surgery. At this time given his current symptom profile he would not be appropriate to return to his regular work duties. His responsibilities include prolonged standing bending and assistance with patient transfer which currently he is unable to perform. A letter in support of further absence for medical purposes from his work has been provided. We will arrange followup on the basis of his assessment with orthopedic surgery.\par \par There is a possible need for EMG/NC study should there be no definitive diagnosis or treatment after orthopedic evluation\par \par Thank you for very kindly including me in the ongoing evaluation and treatment of your patient. Please do not hesitate to contact me should he have any concerns or questions regarding this evaluation, the patient's recent lumbosacral surgery, where the indication for referral to orthopedic surgery to evaluate his hip. [FreeTextEntry3] : eRg Medrano MD, PhD, FRCPSC \par Attending Neurosurgeon \par  of Neurosurgery \par [facility] \par [address], [address]nd floor \[place] [address] \par Office: [phone] \par Fax: [phone]\par \par

## 2022-09-01 NOTE — H&P PST ADULT - CARDIOVASCULAR
detailed exam Epidermal Autograft Text: The defect edges were debeveled with a #15 scalpel blade.  Given the location of the defect, shape of the defect and the proximity to free margins an epidermal autograft was deemed most appropriate.  Using a sterile surgical marker, the primary defect shape was transferred to the donor site. The epidermal graft was then harvested.  The skin graft was then placed in the primary defect and oriented appropriately.

## 2022-09-02 ENCOUNTER — APPOINTMENT (OUTPATIENT)
Dept: RADIOLOGY | Facility: CLINIC | Age: 67
End: 2022-09-02

## 2022-09-02 ENCOUNTER — APPOINTMENT (OUTPATIENT)
Dept: MRI IMAGING | Facility: CLINIC | Age: 67
End: 2022-09-02

## 2022-09-02 ENCOUNTER — OUTPATIENT (OUTPATIENT)
Dept: OUTPATIENT SERVICES | Facility: HOSPITAL | Age: 67
LOS: 1 days | End: 2022-09-02
Payer: MEDICARE

## 2022-09-02 DIAGNOSIS — Z98.890 OTHER SPECIFIED POSTPROCEDURAL STATES: Chronic | ICD-10-CM

## 2022-09-02 DIAGNOSIS — Z98.1 ARTHRODESIS STATUS: ICD-10-CM

## 2022-09-02 DIAGNOSIS — Z96.642 PRESENCE OF LEFT ARTIFICIAL HIP JOINT: Chronic | ICD-10-CM

## 2022-09-02 DIAGNOSIS — Z98.1 ARTHRODESIS STATUS: Chronic | ICD-10-CM

## 2022-09-02 PROBLEM — M43.17 ACQUIRED SPONDYLOLISTHESIS OF LUMBOSACRAL REGION: Status: ACTIVE | Noted: 2019-07-01

## 2022-09-02 PROBLEM — M47.816 LUMBAR SPONDYLOSIS: Status: ACTIVE | Noted: 2019-08-05

## 2022-09-02 PROCEDURE — 72110 X-RAY EXAM L-2 SPINE 4/>VWS: CPT | Mod: 26

## 2022-09-02 PROCEDURE — 72148 MRI LUMBAR SPINE W/O DYE: CPT | Mod: 26,MH

## 2022-09-02 PROCEDURE — 72148 MRI LUMBAR SPINE W/O DYE: CPT

## 2022-09-02 PROCEDURE — 72110 X-RAY EXAM L-2 SPINE 4/>VWS: CPT

## 2022-09-08 ENCOUNTER — RESULT REVIEW (OUTPATIENT)
Age: 67
End: 2022-09-08

## 2022-09-08 ENCOUNTER — OUTPATIENT (OUTPATIENT)
Dept: OUTPATIENT SERVICES | Facility: HOSPITAL | Age: 67
LOS: 1 days | End: 2022-09-08
Payer: MEDICARE

## 2022-09-08 ENCOUNTER — APPOINTMENT (OUTPATIENT)
Dept: CT IMAGING | Facility: CLINIC | Age: 67
End: 2022-09-08

## 2022-09-08 DIAGNOSIS — Z98.1 ARTHRODESIS STATUS: Chronic | ICD-10-CM

## 2022-09-08 DIAGNOSIS — Z98.890 OTHER SPECIFIED POSTPROCEDURAL STATES: Chronic | ICD-10-CM

## 2022-09-08 DIAGNOSIS — M54.50 LOW BACK PAIN, UNSPECIFIED: ICD-10-CM

## 2022-09-08 DIAGNOSIS — Z96.642 PRESENCE OF LEFT ARTIFICIAL HIP JOINT: Chronic | ICD-10-CM

## 2022-09-08 PROCEDURE — 72131 CT LUMBAR SPINE W/O DYE: CPT | Mod: 26,MH

## 2022-09-08 PROCEDURE — 72131 CT LUMBAR SPINE W/O DYE: CPT

## 2022-09-08 PROCEDURE — 76376 3D RENDER W/INTRP POSTPROCES: CPT | Mod: 26

## 2022-09-08 PROCEDURE — 76376 3D RENDER W/INTRP POSTPROCES: CPT

## 2022-09-09 ENCOUNTER — NON-APPOINTMENT (OUTPATIENT)
Age: 67
End: 2022-09-09

## 2022-09-19 ENCOUNTER — APPOINTMENT (OUTPATIENT)
Dept: NEUROSURGERY | Facility: CLINIC | Age: 67
End: 2022-09-19

## 2022-09-26 ENCOUNTER — APPOINTMENT (OUTPATIENT)
Dept: NEUROSURGERY | Facility: CLINIC | Age: 67
End: 2022-09-26

## 2022-09-26 VITALS
TEMPERATURE: 97.7 F | WEIGHT: 180 LBS | BODY MASS INDEX: 27.28 KG/M2 | HEIGHT: 68 IN | SYSTOLIC BLOOD PRESSURE: 145 MMHG | HEART RATE: 67 BPM | OXYGEN SATURATION: 99 % | DIASTOLIC BLOOD PRESSURE: 84 MMHG

## 2022-09-26 DIAGNOSIS — M54.50 LOW BACK PAIN, UNSPECIFIED: ICD-10-CM

## 2022-09-26 DIAGNOSIS — M79.604 LOW BACK PAIN, UNSPECIFIED: ICD-10-CM

## 2022-09-26 DIAGNOSIS — M79.609 PAIN IN UNSPECIFIED LIMB: ICD-10-CM

## 2022-09-26 DIAGNOSIS — Z98.1 ARTHRODESIS STATUS: ICD-10-CM

## 2022-09-26 DIAGNOSIS — M79.605 LOW BACK PAIN, UNSPECIFIED: ICD-10-CM

## 2022-09-26 PROCEDURE — 99214 OFFICE O/P EST MOD 30 MIN: CPT

## 2022-09-26 RX ORDER — DIAZEPAM 5 MG/1
5 TABLET ORAL
Qty: 10 | Refills: 0 | Status: ACTIVE | COMMUNITY
Start: 2022-09-26 | End: 1900-01-01

## 2022-09-26 RX ORDER — METHYLPREDNISOLONE 4 MG/1
4 TABLET ORAL
Qty: 1 | Refills: 0 | Status: DISCONTINUED | COMMUNITY
Start: 2022-07-06 | End: 2022-09-26

## 2022-09-26 RX ORDER — METHYLPREDNISOLONE 4 MG/1
4 TABLET ORAL
Qty: 1 | Refills: 0 | Status: DISCONTINUED | COMMUNITY
Start: 2022-08-22 | End: 2022-09-26

## 2022-09-26 NOTE — REASON FOR VISIT
[Follow-Up: _____] : a [unfilled] follow-up visit [Other: _____] : [unfilled] [FreeTextEntry1] : Acute onset of lumbar radiculopathy

## 2022-09-26 NOTE — REASON FOR VISIT
[Follow-Up: _____] : a [unfilled] follow-up visit [Other: _____] : [unfilled] [FreeTextEntry1] : S/P TLIF in 2019 and recurrent back pain

## 2022-09-26 NOTE — CONSULT LETTER
[Dear  ___] : Dear  [unfilled], [Courtesy Letter:] : I had the pleasure of seeing your patient, [unfilled], in my office today. [Sincerely,] : Sincerely, [DrSosa  ___] : Dr. 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   To recap, the patient has an extensive history of lumbar radiculopathy and surgically treated spondylolisthesis at L5-S1 with associated degenerative disc and facet arthropathy with stenosis at the affected level and left-sided radiculopathy.   The patient underwent a minimally invasive TLIF procedure at the L5-S1 level on August 20, 2019 without complication.  Since this surgical intervention the patient has been pain free until June 24, 2022 after he lifted a heavy cabinet, heard a popping sound in his back, and then had a sudden onset of a left sided lower back pain and bilateral posterior thigh pain.  The patient also reports numbness of his feet bilaterally and difficulty walking at times and going up and down stairs.  In addition, the patient has undergone a cervical fusion and discectomy in the 1990's. \par \par  At this point in time The pain is described as an achy dull pain just below the level of prior fusion.   The pain does radiate into the posterior thighs.  No shooting pain,   The patient has difficulty sitting.   The patient has no changes of bowel or bladder function since this injury.  The patient denies any leg or foot weakness.  He does not require any assistive devices.  The patient is taking Xarelto for a history of AFib and has been taking two tablets of Aleve a week since the onset of his back pain.   The patient is athletic and active, exercising and lifting weights on a daily basis.  The patient also has Tizanidine and Oxycodone for his pain.     In the past he has taken a Medrol dose pack which provided minor relief. \par \par I reviewed directly with the patient the CT, xrays and MRI of  lumbar spine from [facility] performed on 9/8/2022 shows notable retrolisthesis at L2 level.  There are osteophytes and Modic changes.  The MRI from [facility] dated from 9/2/2022 shows degenerative arthritic changes above the level of fusion.   Ligamentous thickening is noted and at  L1 -2 widening of the facet  joint bilaterally.  This could be associated with micro movement above the fusion.  The hardware is stable and no lucency noted.   \par \par There is no change in the patients neurologic examination.  \par \par  The patient and I had a discussion about the possible causes related to his pain which is most likely related to muscle pain.   There is no hardware failure, disc herniation, or cord compression on imaging.    Neurosurgery was not recommended at this time.  Massage therapy to release the muscle tension was recommended.   Topical pain patches were suggested.    Muscle relaxers were too sedative in the past.  We discussed other medications that could be beneficial and Valium 5 mg will be prescribed.  The side effects were discussed and understood.  No driving.   LESI were discussed and trigger points at the SI joint may be of benefit.   A medial and low facet block at L2 L3 was suggested as well.   The patient will follow up with pain management and if his pain is refractory to non surgical measures he will return to the office for re-evaluation.  \par \par   Thank you for very kindly including me in the ongoing evaluation and treatment of your patient. Please do not hesitate to contact me should he have any concerns or questions regarding this evaluation or the patient's present plan of care. [FreeTextEntry3] : Reg Medrano MD, PhD, FRCPSC \par Attending Neurosurgeon \par  of Neurosurgery \par [facility] \par [address], [address] floor \[place] [address] \par Office: [phone] \par Fax: [phone]\par \par

## 2022-09-26 NOTE — CONSULT LETTER
[Dear  ___] : Dear  [unfilled], [Courtesy Letter:] : I had the pleasure of seeing your patient, [unfilled], in my office today. 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   To recap, the patient has an extensive history of lumbar radiculopathy and surgically treated spondylolisthesis at L5-S1 with associated degenerative disc and facet arthropathy with stenosis at the affected level and left-sided radiculopathy.   The patient underwent a minimally invasive TLIF procedure at the L5-S1 level on August 20, 2019 without complication.  Since this surgical intervention the patient has been pain free until June 24, 2022 when he lifted a heavy cabinet, heard a popping sound in his back, and then had a sudden onset of a left sided lower back pain and bilateral posterior thigh pain.  The patient also reports numbness of his feet bilaterally and difficulty walking at times and going up and down stairs.  In addition, the patient has undergone a cervical fusion and discectomy in the 1990's.  \par \par The pain is described as an achy dull pain that lasts all day and only radiate into the posterior thighs.  No shooting pain, but his feet have been numb and these symptoms have progressed since his injury in June.   The patient has no changes of bowel or bladder function since this injury, he does have chronic urinary dribbling and in the past has been evaluated by Urology .  The patient denies any leg or foot weakness.  He does not require any assistive devices.  The patient is taking Xarelto for a history of AFib and has been taking two tablets of Aleve a week since the onset of his back pain.   The patient is athletic and active, exercising and lifting weights on a daily basis.  The patient also has Tizanidine and Oxycodone for his pain.     In the past he has taken a Medrol dose nannette which provided unsustained relief.\par \par There are no update images for review at today's visit.    \par \par On neurologic examination, the patient is alert and oriented.  Appears well and in no distress.  Speech clear and and fluent.  CN intact.  Hearing normal.  There is a sensory change of both calf laterally with decreased temperature sensation, otherwise normal sensation through out.   No dysmetria of LE.  Full strength in all muscle groups.  No dorsiflex weakness.  Reflexes are 2+ in all extremities and equal and symmetric through out, except a brisk right knee reflex was noted.   Plantar reflexes are flexor.  MAXI are intact.  No abnormal extraneous movements.  No clonus.  Romberg is absent.   Gait is steady.  Difficulty with Tandem gait.   The patient was able to walk on his toes with out difficulty.  Negative straight leg testing bilaterally.\par \par The patient and I had a discussion about the possible causes related to his pain.  It is highly possible that a herniated disc is present which correlates to his clinical presentation.  An MRI of the lumbar spine was ordered to assess for spinal compression, hardware failure, herniated disc, or adjacent stenosis.   A flexion extension lumbar Xray was ordered to assess for spondylolisthesis.  The patient indicates that the physical therapy intervention focusing on the back has not made any significant difference in the past and deferred this at the visit.   We discussed all non surgical treatment options and he will undergo an MRI and then determine the approach of care.   The patient was  given instructions about the combination of Aleve and Xarelto, which should be avoided.  The patient deferred a medrol dose for now.  If he experiences increased symptoms or new signs of weakness, he will contact the office.  Once his images are complete he will inform the office and the images will be reviewed with a discussion for further treatment options.         \par \par Thank you for very kindly including me in the ongoing evaluation and treatment of your patient. Please do not hesitate to contact me should he have any concerns or questions regarding this evaluation or the patient's present plan of care. [FreeTextEntry3] : Kimberly Lombardo, DNP, NP\par Nurse Practitioner\par Neurosurgery and Spine\par [facility] [place]\par [address]\[place]  [address]\par Assistant \par [employer]\par and Physician Assistant Studies\par email: [email or web address] P- [phone]\par F- [phone]\par \par \par

## 2022-09-26 NOTE — DATA REVIEWED
[de-identified] : CT scan of lumbar spine  [facility] 9/8/2022 [de-identified] : MRI of lumbar spine from [facility] 9/2/2022 [de-identified] : Lumbar Xray from [facility] 9/2022

## 2023-02-23 RX ORDER — METHYLPREDNISOLONE 4 MG/1
4 TABLET ORAL
Qty: 1 | Refills: 0 | Status: ACTIVE | COMMUNITY
Start: 2023-02-23 | End: 1900-01-01

## 2023-03-08 ENCOUNTER — NON-APPOINTMENT (OUTPATIENT)
Age: 68
End: 2023-03-08

## 2023-04-28 NOTE — PRE-ANESTHESIA EVALUATION ADULT - HEIGHT IN INCHES
PATIENTINFORMATION    Date of Office Visit: 2023  Encounter Provider: John Portillo MD  Place of Service: [facility] CARDIOLOGY  Patient Name: Juan Lima  : 1950    Subjective:     Encounter Date:2023      Patient ID: Juan Lima is a 72 y.o. male.    No chief complaint on file.    HPI  Mr. Lima is a pleasant 72 years old gentleman who came to cardiac clinic for follow-up visit.  He reports right lower extremity swelling for the past 2 weeks with some mild improvement.  Usually gets worse at the end of the day.  He denies any significant discomfort of extremity, change in his breathing, chest pain, trauma.  He continues to be active and exercise regularly without any significant limiting symptoms otherwise.  No recent ER visit or hospitalization since last clinic visit      ROS  All systems reviewed and negative except as noted in HPI.    Past Medical History:   Diagnosis Date   • Familial hypercholesteremia    • Graves disease        Past Surgical History:   Procedure Laterality Date   • COLONOSCOPY  10/217   • EYE SURGERY Left 2020   • EYE SURGERY Right 2021   • HERNIA REPAIR     • THYROIDECTOMY     • WRIST SURGERY  2017    left wrist        Social History     Socioeconomic History   • Marital status:    Tobacco Use   • Smoking status: Never   • Smokeless tobacco: Never   Substance and Sexual Activity   • Alcohol use: Yes     Alcohol/week: 2.0 standard drinks     Types: 2 Shots of liquor per week   • Drug use: No   • Sexual activity: Yes     Partners: Female     Birth control/protection: None       Family History   Problem Relation Age of Onset   • Osteoporosis Mother    • Breast cancer Mother    • Scoliosis Mother    • Heart disease Father            ECG 12 Lead    Date/Time: 2023 12:42 PM  Performed by: John Portillo MD  Authorized by: John Portillo MD   Comparison: compared with previous ECG from 2022  Similar to  previous ECG  Rhythm: atrial flutter  Rate: normal  Conduction: conduction normal  ST Segments: ST segments normal  T Waves: T waves normal  QRS axis: normal  Other: no other findings    Clinical impression: abnormal EKG               Objective:     There were no vitals taken for this visit. There is no height or weight on file to calculate BMI.     Constitutional:       General: Not in acute distress.     Appearance: Well-developed. Not diaphoretic.   Eyes:      Pupils: Pupils are equal, round, and reactive to light.   HENT:      Head: Normocephalic and atraumatic.   Neck:      Thyroid: No thyromegaly.   Pulmonary:      Effort: Pulmonary effort is normal. No respiratory distress.      Breath sounds: Normal breath sounds. No wheezing. No rales.   Chest:      Chest wall: Not tender to palpatation.   Cardiovascular:      Normal rate. Regular rhythm.      No gallop.   Pulses:     Intact distal pulses.   Edema:     Peripheral edema absent.   Abdominal:      General: Bowel sounds are normal. There is no distension.      Palpations: Abdomen is soft.      Tenderness: There is no guarding.   Musculoskeletal: Normal range of motion.         General: No deformity.      Cervical back: Normal range of motion and neck supple. Skin:     General: Skin is warm and dry.      Findings: No rash.   Neurological:      Mental Status: Alert and oriented to person, place, and time.      Cranial Nerves: No cranial nerve deficit.      Deep Tendon Reflexes: Reflexes are normal and symmetric.   Psychiatric:         Judgment: Judgment normal.         Review Of Data: I have reviewed pertinent recent labs, images and documents and pertinent findings included in HPI or assessment below.    Lipid Panel        9/22/2022    08:23 4/5/2023    08:35   Lipid Panel   Total Cholesterol 120   119     Triglycerides 45   55     HDL Cholesterol 61   59     VLDL Cholesterol 11   12     LDL Cholesterol  48   48           Assessment/Plan:         1. Persistent  asymptomatic typical atrial flutter: Rate controlled without AV josefina blockers, on low-dose aspirin  2. Hypercholesterolemia: On simvastatin-lipid panel at goal.  3. As symmetric pitting right lower extremity swelling: Get venous Doppler to rule out DVT.    Mr. Lima remains asymptomatic with regards to atrial flutter.  Rate controlled without AV josefina blockers.  Continue low-dose aspirin.  Get venous Doppler to rule out DVT: Venous Doppler negative for DVT.  Treat symptomatically with compression stocking during daytime and leg elevation at nighttime.  Otherwise I will see him back in 6 months or sooner with any concerning symptoms.  Diagnosis and plan of care discussed with patient and verbalized understanding.            Your medication list          Accurate as of April 28, 2023 12:43 PM. If you have any questions, ask your nurse or doctor.            CONTINUE taking these medications      Instructions Last Dose Given Next Dose Due   aspirin 81 MG EC tablet      Take 1 tablet by mouth Daily.       CBD oral oil  Commonly known as: cannabidiol      Take  by mouth.       FIBER CHOICE PO      Take 2 tablets by mouth Daily.       Glucosamine-Chondroitin--200-150 MG tablet      Take  by mouth.       levothyroxine 150 MCG tablet  Commonly known as: SYNTHROID, LEVOTHROID      TAKE 1 TABLET BY MOUTH  DAILY       multivitamin tablet tablet  Commonly known as: THERAGRAN      Take 1 tablet by mouth Daily.       oxybutynin XL 5 MG 24 hr tablet  Commonly known as: DITROPAN-XL           Saw Palmetto 1000 MG capsule      Take  by mouth.       simvastatin 20 MG tablet  Commonly known as: ZOCOR      TAKE 1 TABLET BY MOUTH AT  NIGHT       tamsulosin 0.4 MG capsule 24 hr capsule  Commonly known as: FLOMAX      TAKE 1 CAPSULE BY MOUTH  DAILY                  John Portillo MD  04/28/23  12:43 EDT     8

## 2023-07-13 ENCOUNTER — NON-APPOINTMENT (OUTPATIENT)
Age: 68
End: 2023-07-13

## 2023-07-13 RX ORDER — AMOXICILLIN 500 MG/1
500 CAPSULE ORAL
Qty: 12 | Refills: 2 | Status: ACTIVE | COMMUNITY
Start: 2020-12-18 | End: 1900-01-01

## 2023-07-18 ENCOUNTER — APPOINTMENT (OUTPATIENT)
Dept: ORTHOPEDIC SURGERY | Facility: CLINIC | Age: 68
End: 2023-07-18
Payer: MEDICARE

## 2023-07-18 VITALS — SYSTOLIC BLOOD PRESSURE: 127 MMHG | HEART RATE: 71 BPM | DIASTOLIC BLOOD PRESSURE: 61 MMHG

## 2023-07-18 DIAGNOSIS — S43.401A UNSPECIFIED SPRAIN OF RIGHT SHOULDER JOINT, INITIAL ENCOUNTER: ICD-10-CM

## 2023-07-18 DIAGNOSIS — M25.519 PAIN IN UNSPECIFIED SHOULDER: ICD-10-CM

## 2023-07-18 DIAGNOSIS — Z98.890 OTHER SPECIFIED POSTPROCEDURAL STATES: ICD-10-CM

## 2023-07-18 PROCEDURE — 73030 X-RAY EXAM OF SHOULDER: CPT | Mod: RT

## 2023-07-18 PROCEDURE — 99214 OFFICE O/P EST MOD 30 MIN: CPT

## 2023-07-30 ENCOUNTER — APPOINTMENT (OUTPATIENT)
Dept: MRI IMAGING | Facility: CLINIC | Age: 68
End: 2023-07-30

## 2023-08-31 NOTE — PHYSICAL THERAPY INITIAL EVALUATION ADULT - FOLLOWS COMMANDS/ANSWERS QUESTIONS, REHAB EVAL
100% of the time Picato Counseling:  I discussed with the patient the risks of Picato including but not limited to erythema, scaling, itching, weeping, crusting, and pain.

## 2024-01-12 NOTE — ASU DISCHARGE PLAN (ADULT/PEDIATRIC) - PHYSICIAN SECTION COMPLETE
Patient's wife Saira called in and patient had to go back to hospital because device area was bleeding.  She stated that they will take patient back to ER if bleeding worsen and scheduled for device check with Device clinic to evaluate wound.  Saira verbalized understanding.   Yes

## 2024-01-19 ENCOUNTER — APPOINTMENT (OUTPATIENT)
Dept: CT IMAGING | Facility: CLINIC | Age: 69
End: 2024-01-19

## 2024-04-30 NOTE — ASU PREOP CHECKLIST - ANTIBIOTIC
Pt agreed to left thyroid FNA on 5/13/2024. Pt is on coumadin and [facility] notified pt's INR will need to be <1.8. Pt instructed that [facility] will be calling her to discuss coumadin dosing prior to FNA/biopsy. Pt verbalized understanding.   yes
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
## 2025-01-13 ENCOUNTER — NON-APPOINTMENT (OUTPATIENT)
Age: 70
End: 2025-01-13

## 2025-02-25 ENCOUNTER — APPOINTMENT (OUTPATIENT)
Dept: ELECTROPHYSIOLOGY | Facility: CLINIC | Age: 70
End: 2025-02-25
Payer: MEDICARE

## 2025-02-25 ENCOUNTER — NON-APPOINTMENT (OUTPATIENT)
Age: 70
End: 2025-02-25

## 2025-02-25 VITALS
DIASTOLIC BLOOD PRESSURE: 98 MMHG | OXYGEN SATURATION: 98 % | BODY MASS INDEX: 28.04 KG/M2 | SYSTOLIC BLOOD PRESSURE: 177 MMHG | HEART RATE: 71 BPM | HEIGHT: 68 IN | WEIGHT: 185 LBS

## 2025-02-25 DIAGNOSIS — I48.91 UNSPECIFIED ATRIAL FIBRILLATION: ICD-10-CM

## 2025-02-25 PROCEDURE — 99205 OFFICE O/P NEW HI 60 MIN: CPT

## 2025-02-25 PROCEDURE — 93000 ELECTROCARDIOGRAM COMPLETE: CPT

## 2025-03-04 ENCOUNTER — APPOINTMENT (OUTPATIENT)
Dept: CT IMAGING | Facility: CLINIC | Age: 70
End: 2025-03-04
Payer: MEDICARE

## 2025-03-04 ENCOUNTER — OUTPATIENT (OUTPATIENT)
Dept: OUTPATIENT SERVICES | Facility: HOSPITAL | Age: 70
LOS: 1 days | End: 2025-03-04
Payer: MEDICARE

## 2025-03-04 DIAGNOSIS — Z98.890 OTHER SPECIFIED POSTPROCEDURAL STATES: Chronic | ICD-10-CM

## 2025-03-04 DIAGNOSIS — Z96.642 PRESENCE OF LEFT ARTIFICIAL HIP JOINT: Chronic | ICD-10-CM

## 2025-03-04 DIAGNOSIS — Z98.1 ARTHRODESIS STATUS: Chronic | ICD-10-CM

## 2025-03-04 DIAGNOSIS — I48.91 UNSPECIFIED ATRIAL FIBRILLATION: ICD-10-CM

## 2025-03-04 PROCEDURE — 75572 CT HRT W/3D IMAGE: CPT | Mod: 26

## 2025-03-04 PROCEDURE — 75572 CT HRT W/3D IMAGE: CPT

## 2025-03-06 NOTE — ED PROVIDER NOTE - NS_EDPROVIDERDISPOUSERTYPE_ED_A_ED
SPIRITUAL HEALTH SERVICES Note     Saw pt Fang Estes and administered Tenriism sacrament of anointing for the healing of the sick.    Fr. Lon Dinh     Attending Attestation (For Attendings USE Only)...

## 2025-05-15 ENCOUNTER — EMERGENCY (EMERGENCY)
Facility: HOSPITAL | Age: 70
LOS: 0 days | Discharge: HOME | End: 2025-05-15
Attending: EMERGENCY MEDICINE
Payer: MEDICARE

## 2025-05-15 VITALS
DIASTOLIC BLOOD PRESSURE: 66 MMHG | HEART RATE: 81 BPM | OXYGEN SATURATION: 100 % | SYSTOLIC BLOOD PRESSURE: 132 MMHG | TEMPERATURE: 101 F | RESPIRATION RATE: 17 BRPM

## 2025-05-15 VITALS — WEIGHT: 182.98 LBS

## 2025-05-15 DIAGNOSIS — Z98.890 OTHER SPECIFIED POSTPROCEDURAL STATES: ICD-10-CM

## 2025-05-15 DIAGNOSIS — I48.0 PAROXYSMAL ATRIAL FIBRILLATION: ICD-10-CM

## 2025-05-15 DIAGNOSIS — N40.0 BENIGN PROSTATIC HYPERPLASIA WITHOUT LOWER URINARY TRACT SYMPTOMS: ICD-10-CM

## 2025-05-15 DIAGNOSIS — Z98.890 OTHER SPECIFIED POSTPROCEDURAL STATES: Chronic | ICD-10-CM

## 2025-05-15 DIAGNOSIS — Z86.79 PERSONAL HISTORY OF OTHER DISEASES OF THE CIRCULATORY SYSTEM: ICD-10-CM

## 2025-05-15 DIAGNOSIS — N39.0 URINARY TRACT INFECTION, SITE NOT SPECIFIED: ICD-10-CM

## 2025-05-15 DIAGNOSIS — Z98.1 ARTHRODESIS STATUS: Chronic | ICD-10-CM

## 2025-05-15 DIAGNOSIS — R50.9 FEVER, UNSPECIFIED: ICD-10-CM

## 2025-05-15 DIAGNOSIS — Z79.01 LONG TERM (CURRENT) USE OF ANTICOAGULANTS: ICD-10-CM

## 2025-05-15 DIAGNOSIS — E78.5 HYPERLIPIDEMIA, UNSPECIFIED: ICD-10-CM

## 2025-05-15 DIAGNOSIS — Z86.718 PERSONAL HISTORY OF OTHER VENOUS THROMBOSIS AND EMBOLISM: ICD-10-CM

## 2025-05-15 DIAGNOSIS — Z96.642 PRESENCE OF LEFT ARTIFICIAL HIP JOINT: Chronic | ICD-10-CM

## 2025-05-15 LAB
ALBUMIN SERPL ELPH-MCNC: 3.9 G/DL — SIGNIFICANT CHANGE UP (ref 3.3–5)
ALP SERPL-CCNC: 85 U/L — SIGNIFICANT CHANGE UP (ref 40–120)
ALT FLD-CCNC: 24 U/L — SIGNIFICANT CHANGE UP (ref 12–78)
ANION GAP SERPL CALC-SCNC: 6 MMOL/L — SIGNIFICANT CHANGE UP (ref 5–17)
APPEARANCE UR: ABNORMAL
APTT BLD: 32.1 SEC — SIGNIFICANT CHANGE UP (ref 26.1–36.8)
AST SERPL-CCNC: 18 U/L — SIGNIFICANT CHANGE UP (ref 15–37)
BACTERIA # UR AUTO: ABNORMAL /HPF
BASOPHILS # BLD AUTO: 0.03 K/UL — SIGNIFICANT CHANGE UP (ref 0–0.2)
BASOPHILS NFR BLD AUTO: 0.2 % — SIGNIFICANT CHANGE UP (ref 0–2)
BILIRUB SERPL-MCNC: 2.1 MG/DL — HIGH (ref 0.2–1.2)
BILIRUB UR-MCNC: NEGATIVE — SIGNIFICANT CHANGE UP
BUN SERPL-MCNC: 11 MG/DL — SIGNIFICANT CHANGE UP (ref 7–23)
CALCIUM SERPL-MCNC: 9.3 MG/DL — SIGNIFICANT CHANGE UP (ref 8.5–10.1)
CAST: 0 /LPF — SIGNIFICANT CHANGE UP (ref 0–4)
CHLORIDE SERPL-SCNC: 102 MMOL/L — SIGNIFICANT CHANGE UP (ref 96–108)
CO2 SERPL-SCNC: 28 MMOL/L — SIGNIFICANT CHANGE UP (ref 22–31)
COLOR SPEC: YELLOW — SIGNIFICANT CHANGE UP
CREAT SERPL-MCNC: 1.18 MG/DL — SIGNIFICANT CHANGE UP (ref 0.5–1.3)
DIFF PNL FLD: ABNORMAL
EGFR: 66 ML/MIN/1.73M2 — SIGNIFICANT CHANGE UP
EGFR: 66 ML/MIN/1.73M2 — SIGNIFICANT CHANGE UP
EOSINOPHIL # BLD AUTO: 0.01 K/UL — SIGNIFICANT CHANGE UP (ref 0–0.5)
EOSINOPHIL NFR BLD AUTO: 0.1 % — SIGNIFICANT CHANGE UP (ref 0–6)
GLUCOSE SERPL-MCNC: 116 MG/DL — HIGH (ref 70–99)
GLUCOSE UR QL: NEGATIVE MG/DL — SIGNIFICANT CHANGE UP
HCT VFR BLD CALC: 46.2 % — SIGNIFICANT CHANGE UP (ref 39–50)
HGB BLD-MCNC: 15.7 G/DL — SIGNIFICANT CHANGE UP (ref 13–17)
IMM GRANULOCYTES # BLD AUTO: 0.05 K/UL — SIGNIFICANT CHANGE UP (ref 0–0.07)
IMM GRANULOCYTES NFR BLD AUTO: 0.3 % — SIGNIFICANT CHANGE UP (ref 0–0.9)
INR BLD: 1.31 RATIO — HIGH (ref 0.85–1.16)
KETONES UR QL: ABNORMAL MG/DL
LACTATE SERPL-SCNC: 1.4 MMOL/L — SIGNIFICANT CHANGE UP (ref 0.7–2)
LEUKOCYTE ESTERASE UR-ACNC: ABNORMAL
LYMPHOCYTES # BLD AUTO: 0.52 K/UL — LOW (ref 1–3.3)
LYMPHOCYTES NFR BLD AUTO: 3.5 % — LOW (ref 13–44)
MCHC RBC-ENTMCNC: 30.7 PG — SIGNIFICANT CHANGE UP (ref 27–34)
MCHC RBC-ENTMCNC: 34 G/DL — SIGNIFICANT CHANGE UP (ref 32–36)
MCV RBC AUTO: 90.2 FL — SIGNIFICANT CHANGE UP (ref 80–100)
MONOCYTES # BLD AUTO: 0.64 K/UL — SIGNIFICANT CHANGE UP (ref 0–0.9)
MONOCYTES NFR BLD AUTO: 4.3 % — SIGNIFICANT CHANGE UP (ref 2–14)
NEUTROPHILS # BLD AUTO: 13.51 K/UL — HIGH (ref 1.8–7.4)
NEUTROPHILS NFR BLD AUTO: 91.6 % — HIGH (ref 43–77)
NITRITE UR-MCNC: POSITIVE
NRBC # BLD AUTO: 0 K/UL — SIGNIFICANT CHANGE UP (ref 0–0)
NRBC # FLD: 0 K/UL — SIGNIFICANT CHANGE UP (ref 0–0)
NRBC BLD AUTO-RTO: 0 /100 WBCS — SIGNIFICANT CHANGE UP (ref 0–0)
PH UR: 6 — SIGNIFICANT CHANGE UP (ref 5–8)
PLATELET # BLD AUTO: 242 K/UL — SIGNIFICANT CHANGE UP (ref 150–400)
PMV BLD: 10.5 FL — SIGNIFICANT CHANGE UP (ref 7–13)
POTASSIUM SERPL-MCNC: 3.7 MMOL/L — SIGNIFICANT CHANGE UP (ref 3.5–5.3)
POTASSIUM SERPL-SCNC: 3.7 MMOL/L — SIGNIFICANT CHANGE UP (ref 3.5–5.3)
PROT SERPL-MCNC: 7.6 GM/DL — SIGNIFICANT CHANGE UP (ref 6–8.3)
PROT UR-MCNC: 30 MG/DL
PROTHROM AB SERPL-ACNC: 15 SEC — HIGH (ref 9.9–13.4)
RBC # BLD: 5.12 M/UL — SIGNIFICANT CHANGE UP (ref 4.2–5.8)
RBC # FLD: 14.1 % — SIGNIFICANT CHANGE UP (ref 10.3–14.5)
RBC CASTS # UR COMP ASSIST: 16 /HPF — HIGH (ref 0–4)
SODIUM SERPL-SCNC: 136 MMOL/L — SIGNIFICANT CHANGE UP (ref 135–145)
SP GR SPEC: 1.02 — SIGNIFICANT CHANGE UP (ref 1–1.03)
SQUAMOUS # UR AUTO: 0 /HPF — SIGNIFICANT CHANGE UP (ref 0–5)
UROBILINOGEN FLD QL: 0.2 MG/DL — SIGNIFICANT CHANGE UP (ref 0.2–1)
WBC # BLD: 14.76 K/UL — HIGH (ref 3.8–10.5)
WBC # FLD AUTO: 14.76 K/UL — HIGH (ref 3.8–10.5)
WBC UR QL: 138 /HPF — HIGH (ref 0–5)

## 2025-05-15 PROCEDURE — 87086 URINE CULTURE/COLONY COUNT: CPT

## 2025-05-15 PROCEDURE — 81001 URINALYSIS AUTO W/SCOPE: CPT

## 2025-05-15 PROCEDURE — 85610 PROTHROMBIN TIME: CPT

## 2025-05-15 PROCEDURE — 80053 COMPREHEN METABOLIC PANEL: CPT

## 2025-05-15 PROCEDURE — 87040 BLOOD CULTURE FOR BACTERIA: CPT

## 2025-05-15 PROCEDURE — 99284 EMERGENCY DEPT VISIT MOD MDM: CPT | Mod: FS

## 2025-05-15 PROCEDURE — 36415 COLL VENOUS BLD VENIPUNCTURE: CPT

## 2025-05-15 PROCEDURE — 85025 COMPLETE CBC W/AUTO DIFF WBC: CPT

## 2025-05-15 PROCEDURE — 85730 THROMBOPLASTIN TIME PARTIAL: CPT

## 2025-05-15 PROCEDURE — 87077 CULTURE AEROBIC IDENTIFY: CPT

## 2025-05-15 PROCEDURE — 87150 DNA/RNA AMPLIFIED PROBE: CPT

## 2025-05-15 PROCEDURE — 87186 SC STD MICRODIL/AGAR DIL: CPT

## 2025-05-15 PROCEDURE — 83605 ASSAY OF LACTIC ACID: CPT

## 2025-05-15 PROCEDURE — 96374 THER/PROPH/DIAG INJ IV PUSH: CPT

## 2025-05-15 PROCEDURE — 99284 EMERGENCY DEPT VISIT MOD MDM: CPT | Mod: 25

## 2025-05-15 RX ORDER — CEFTRIAXONE 500 MG/1
1000 INJECTION, POWDER, FOR SOLUTION INTRAMUSCULAR; INTRAVENOUS ONCE
Refills: 0 | Status: DISCONTINUED | OUTPATIENT
Start: 2025-05-15 | End: 2025-05-15

## 2025-05-15 RX ORDER — CEFTRIAXONE 500 MG/1
1000 INJECTION, POWDER, FOR SOLUTION INTRAMUSCULAR; INTRAVENOUS ONCE
Refills: 0 | Status: COMPLETED | OUTPATIENT
Start: 2025-05-15 | End: 2025-05-15

## 2025-05-15 RX ORDER — CEFUROXIME SODIUM 1.5 G
1 VIAL (EA) INJECTION
Qty: 14 | Refills: 0
Start: 2025-05-15 | End: 2025-05-21

## 2025-05-15 RX ORDER — ACETAMINOPHEN 500 MG/5ML
1000 LIQUID (ML) ORAL ONCE
Refills: 0 | Status: COMPLETED | OUTPATIENT
Start: 2025-05-15 | End: 2025-05-15

## 2025-05-15 RX ADMIN — Medication 1000 MILLIGRAM(S): at 16:25

## 2025-05-15 RX ADMIN — CEFTRIAXONE 1000 MILLIGRAM(S): 500 INJECTION, POWDER, FOR SOLUTION INTRAMUSCULAR; INTRAVENOUS at 16:25

## 2025-05-15 RX ADMIN — Medication 1000 MILLILITER(S): at 16:25

## 2025-05-15 NOTE — ED ADULT NURSE NOTE - NSFALLHARMRISKINTERV_ED_ALL_ED

## 2025-05-15 NOTE — ED STATDOCS - CLINICAL SUMMARY MEDICAL DECISION MAKING FREE TEXT BOX
70 y/o M presents to the ED c/o fever. Pt s/p Cystoscopy yesterday. Also had some takeout last night. No vomiting no diarrhea. Check labs and urine.

## 2025-05-15 NOTE — ED ADULT NURSE NOTE - OBJECTIVE STATEMENT
pt presented to the er c/o fever and chills beginning this morning. reports he had a cystoscopy yesterday due to hematuria. denies current hematuria, n/v/d. wife at bedside. safety and comfort measures in place.

## 2025-05-15 NOTE — ED STATDOCS - NSFOLLOWUPINSTRUCTIONS_ED_ALL_ED_FT
FOLLOW UP WITH DR GOLDBERG TOMORROW. CALL THE OFFICE TO MAKE AN APPOINTMENT. RETURN TO ER FOR ANY WORSENING SYMPTOMS OR NEW CONCERNS.     Urinary Tract Infection, Adult  ImageA urinary tract infection (UTI) is an infection of any part of the urinary tract, which includes the kidneys, ureters, bladder, and urethra. These organs make, store, and get rid of urine in the body. UTI can be a bladder infection (cystitis) or kidney infection (pyelonephritis).    What are the causes?  This infection may be caused by fungi, viruses, or bacteria. Bacteria are the most common cause of UTIs. This condition can also be caused by repeated incomplete emptying of the bladder during urination.    What increases the risk?  This condition is more likely to develop if:    You ignore your need to urinate or hold urine for long periods of time.  You do not empty your bladder completely during urination.  You wipe back to front after urinating or having a bowel movement, if you are female.  You are uncircumcised, if you are male.  You are constipated.  You have a urinary catheter that stays in place (indwelling).  You have a weak defense (immune) system.  You have a medical condition that affects your bowels, kidneys, or bladder.  You have diabetes.  You take antibiotic medicines frequently or for long periods of time, and the antibiotics no longer work well against certain types of infections (antibiotic resistance).  You take medicines that irritate your urinary tract.  You are exposed to chemicals that irritate your urinary tract.  You are female.    What are the signs or symptoms?  Symptoms of this condition include:    Fever.  Frequent urination or passing small amounts of urine frequently.  Needing to urinate urgently.  Pain or burning with urination.  Urine that smells bad or unusual.  Cloudy urine.  Pain in the lower abdomen or back.  Trouble urinating.  Blood in the urine.  Vomiting or being less hungry than normal.  Diarrhea or abdominal pain.  Vaginal discharge, if you are female.    How is this diagnosed?  This condition is diagnosed with a medical history and physical exam. You will also need to provide a urine sample to test your urine. Other tests may be done, including:    Blood tests.  Sexually transmitted disease (STD) testing.    If you have had more than one UTI, a cystoscopy or imaging studies may be done to determine the cause of the infections.    How is this treated?  Treatment for this condition often includes a combination of two or more of the following:    Antibiotic medicine.  Other medicines to treat less common causes of UTI.  Over-the-counter medicines to treat pain.  Drinking enough water to stay hydrated.    Follow these instructions at home:  Take over-the-counter and prescription medicines only as told by your health care provider.  If you were prescribed an antibiotic, take it as told by your health care provider. Do not stop taking the antibiotic even if you start to feel better.  Avoid alcohol, caffeine, tea, and carbonated beverages. They can irritate your bladder.  Drink enough fluid to keep your urine clear or pale yellow.  Keep all follow-up visits as told by your health care provider. This is important.  ImageMake sure to:    Empty your bladder often and completely. Do not hold urine for long periods of time.  Empty your bladder before and after sex.  Wipe from front to back after a bowel movement if you are female. Use each tissue one time when you wipe.    Contact a health care provider if:  You have back pain.  You have a fever.  You feel nauseous or vomit.  Your symptoms do not get better after 3 days.  Your symptoms go away and then return.  Get help right away if:  You have severe back pain or lower abdominal pain.  You are vomiting and cannot keep down any medicines or water.  This information is not intended to replace advice given to you by your health care provider. Make sure you discuss any questions you have with your health care provider.

## 2025-05-15 NOTE — ED ADULT TRIAGE NOTE - LOCATION:
----- Message from Shawn Rogers sent at 9/25/2024 10:52 AM CDT -----  Regarding: Pt Advice  Contact: [phone]  Missed Callback         Pt returning callback from missed call. Requesting to speak with somebody in  office. Please call [phone].   Right arm;

## 2025-05-15 NOTE — ED STATDOCS - PATIENT PORTAL LINK FT
You can access the FollowMyHealth Patient Portal offered by [facility] by registering at the following website: [email or web address]. By joining [employer]’s FollowMyHealth portal, you will also be able to view your health information using other applications (apps) compatible with our system.

## 2025-05-15 NOTE — ED STATDOCS - PROGRESS NOTE DETAILS
signed Judy Guevara PA-C Pt seen initially in intake by Dr. Luis  69M c/o fever, chills today. Pt s/p cystoscopy yesterday for hematuria with urologist Dr Gary Goldberg in [place]. Pt denies AP/N/V/D/cough. Pt on Xarelto for parox Afib. Did not take tylenol PTA. Plan labs, UA, abx, re-eval. Pt agrees with plan of  care. PMD Blas signed Judy Guevara PA-C   WBC 14, lactate negative. UA +uti. Pt feeling better. Admission offered. pt prefers to DC home on PO abx. I called Dr Goldberg but he is in OR currently and not available. Will DC on cefuroxime. pt to f/u with Dr Goldberg tomorrow. return precautions given. Pt feeling well, pt and family agree with DC and plan of care.

## 2025-05-15 NOTE — ED ADULT NURSE NOTE - CAS DISCH CONDITION
SW met with treatment team to discuss pt's progress and setbacks. SW  2 was present. Pt was admitted, voluntary, for SI, SA by OD, has hx of psychiatric treatment/admissions, hx of Bipolar DO, denies HI/AVH. Since admission, pt reportedly was agitated, aggressive and disrespectful to staff during admission process r/t not being able to use her cell phone while on [facility] unit, required medication to calm down,  slept fair last night, with medications, appetite has improved, attends scheduled group activities, minimal participation, social with peers/staff, performs ADL's, compliant with medications, behavior has been calmer, more cooperative than at admission, reports her depression is worse, anxiety is on/off, denies SI/HI/AVH, will be discharged today, follow-up appointments will be scheduled. Improved

## 2025-05-15 NOTE — ED STATDOCS - OBJECTIVE STATEMENT
68 y/o M with PMHX of BPH, valvular heart disease, HLD , Paroxysmal Afib on Xarelto, hx DVT, lumbar spinal stenosis s/p laminectomy with spinal fusion, s/p cervical spinal fusion, hx L hip replacement , presents to ED  with wife SIB urologist Dr Goldberg c/o fever, chills, starting this morning s/p cystoscopy yesterday. Cystoscopy was done because of hematuria. No hematuria anymore.  States he also had takeout for dinner last night. Denies vomiting, diarrhea ,cough, sore throat, sick contacts. +Recent travel to [place] and [place] ,returned 5/9. +AC use, on Xarelto.

## 2025-05-15 NOTE — ED ADULT TRIAGE NOTE - CHIEF COMPLAINT QUOTE
sib urologist c/o fever, chills difficulty urinating s/p cystoscopy yesterday. pmh: hip replacement, spine surgery, bph.

## 2025-05-17 ENCOUNTER — INPATIENT (INPATIENT)
Facility: HOSPITAL | Age: 70
LOS: 1 days | Discharge: HOME | DRG: 863 | End: 2025-05-19
Attending: HOSPITALIST | Admitting: STUDENT IN AN ORGANIZED HEALTH CARE EDUCATION/TRAINING PROGRAM
Payer: MEDICARE

## 2025-05-17 VITALS
DIASTOLIC BLOOD PRESSURE: 97 MMHG | RESPIRATION RATE: 18 BRPM | HEART RATE: 79 BPM | SYSTOLIC BLOOD PRESSURE: 181 MMHG | WEIGHT: 184.31 LBS | TEMPERATURE: 98 F | OXYGEN SATURATION: 100 %

## 2025-05-17 DIAGNOSIS — Z98.890 OTHER SPECIFIED POSTPROCEDURAL STATES: Chronic | ICD-10-CM

## 2025-05-17 DIAGNOSIS — I48.0 PAROXYSMAL ATRIAL FIBRILLATION: ICD-10-CM

## 2025-05-17 DIAGNOSIS — Z98.1 ARTHRODESIS STATUS: Chronic | ICD-10-CM

## 2025-05-17 DIAGNOSIS — R78.81 BACTEREMIA: ICD-10-CM

## 2025-05-17 DIAGNOSIS — Z96.642 PRESENCE OF LEFT ARTIFICIAL HIP JOINT: Chronic | ICD-10-CM

## 2025-05-17 DIAGNOSIS — Z29.9 ENCOUNTER FOR PROPHYLACTIC MEASURES, UNSPECIFIED: ICD-10-CM

## 2025-05-17 LAB
ALBUMIN SERPL ELPH-MCNC: 3.7 G/DL — SIGNIFICANT CHANGE UP (ref 3.3–5)
ALP SERPL-CCNC: 72 U/L — SIGNIFICANT CHANGE UP (ref 40–120)
ALT FLD-CCNC: 24 U/L — SIGNIFICANT CHANGE UP (ref 12–78)
ANION GAP SERPL CALC-SCNC: 3 MMOL/L — LOW (ref 5–17)
APPEARANCE UR: SIGNIFICANT CHANGE UP
AST SERPL-CCNC: 22 U/L — SIGNIFICANT CHANGE UP (ref 15–37)
BACTERIA # UR AUTO: NEGATIVE /HPF — SIGNIFICANT CHANGE UP
BASOPHILS # BLD AUTO: 0.05 K/UL — SIGNIFICANT CHANGE UP (ref 0–0.2)
BASOPHILS NFR BLD AUTO: 1 % — SIGNIFICANT CHANGE UP (ref 0–2)
BILIRUB SERPL-MCNC: 2.2 MG/DL — HIGH (ref 0.2–1.2)
BILIRUB UR-MCNC: NEGATIVE — SIGNIFICANT CHANGE UP
BUN SERPL-MCNC: 11 MG/DL — SIGNIFICANT CHANGE UP (ref 7–23)
CALCIUM SERPL-MCNC: 9.3 MG/DL — SIGNIFICANT CHANGE UP (ref 8.5–10.1)
CAST: 0 /LPF — SIGNIFICANT CHANGE UP (ref 0–4)
CHLORIDE SERPL-SCNC: 104 MMOL/L — SIGNIFICANT CHANGE UP (ref 96–108)
CITROBAC SP DNA BLD POS QL NAA+PROBE: SIGNIFICANT CHANGE UP
CO2 SERPL-SCNC: 31 MMOL/L — SIGNIFICANT CHANGE UP (ref 22–31)
COLOR SPEC: YELLOW — SIGNIFICANT CHANGE UP
CREAT SERPL-MCNC: 1.3 MG/DL — SIGNIFICANT CHANGE UP (ref 0.5–1.3)
DIFF PNL FLD: ABNORMAL
EGFR: 59 ML/MIN/1.73M2 — LOW
EGFR: 59 ML/MIN/1.73M2 — LOW
EOSINOPHIL # BLD AUTO: 0.04 K/UL — SIGNIFICANT CHANGE UP (ref 0–0.5)
EOSINOPHIL NFR BLD AUTO: 0.8 % — SIGNIFICANT CHANGE UP (ref 0–6)
GLUCOSE SERPL-MCNC: 103 MG/DL — HIGH (ref 70–99)
GLUCOSE UR QL: NEGATIVE MG/DL — SIGNIFICANT CHANGE UP
GRAM STN FLD: ABNORMAL
HCT VFR BLD CALC: 47.4 % — SIGNIFICANT CHANGE UP (ref 39–50)
HGB BLD-MCNC: 15.6 G/DL — SIGNIFICANT CHANGE UP (ref 13–17)
IMM GRANULOCYTES # BLD AUTO: 0.01 K/UL — SIGNIFICANT CHANGE UP (ref 0–0.07)
IMM GRANULOCYTES NFR BLD AUTO: 0.2 % — SIGNIFICANT CHANGE UP (ref 0–0.9)
KETONES UR QL: ABNORMAL MG/DL
LACTATE SERPL-SCNC: 1.4 MMOL/L — SIGNIFICANT CHANGE UP (ref 0.7–2)
LACTATE SERPL-SCNC: 3.2 MMOL/L — HIGH (ref 0.7–2)
LEUKOCYTE ESTERASE UR-ACNC: ABNORMAL
LYMPHOCYTES # BLD AUTO: 0.91 K/UL — LOW (ref 1–3.3)
LYMPHOCYTES NFR BLD AUTO: 17.6 % — SIGNIFICANT CHANGE UP (ref 13–44)
MCHC RBC-ENTMCNC: 30.4 PG — SIGNIFICANT CHANGE UP (ref 27–34)
MCHC RBC-ENTMCNC: 32.9 G/DL — SIGNIFICANT CHANGE UP (ref 32–36)
MCV RBC AUTO: 92.2 FL — SIGNIFICANT CHANGE UP (ref 80–100)
METHOD TYPE: SIGNIFICANT CHANGE UP
MONOCYTES # BLD AUTO: 0.56 K/UL — SIGNIFICANT CHANGE UP (ref 0–0.9)
MONOCYTES NFR BLD AUTO: 10.9 % — SIGNIFICANT CHANGE UP (ref 2–14)
NEUTROPHILS # BLD AUTO: 3.59 K/UL — SIGNIFICANT CHANGE UP (ref 1.8–7.4)
NEUTROPHILS NFR BLD AUTO: 69.5 % — SIGNIFICANT CHANGE UP (ref 43–77)
NITRITE UR-MCNC: NEGATIVE — SIGNIFICANT CHANGE UP
NRBC # BLD AUTO: 0 K/UL — SIGNIFICANT CHANGE UP (ref 0–0)
NRBC # FLD: 0 K/UL — SIGNIFICANT CHANGE UP (ref 0–0)
NRBC BLD AUTO-RTO: 0 /100 WBCS — SIGNIFICANT CHANGE UP (ref 0–0)
PH UR: 5.5 — SIGNIFICANT CHANGE UP (ref 5–8)
PLATELET # BLD AUTO: 199 K/UL — SIGNIFICANT CHANGE UP (ref 150–400)
PMV BLD: 10.1 FL — SIGNIFICANT CHANGE UP (ref 7–13)
POTASSIUM SERPL-MCNC: 3.7 MMOL/L — SIGNIFICANT CHANGE UP (ref 3.5–5.3)
POTASSIUM SERPL-SCNC: 3.7 MMOL/L — SIGNIFICANT CHANGE UP (ref 3.5–5.3)
PROT SERPL-MCNC: 8.1 GM/DL — SIGNIFICANT CHANGE UP (ref 6–8.3)
PROT UR-MCNC: 100 MG/DL
RBC # BLD: 5.14 M/UL — SIGNIFICANT CHANGE UP (ref 4.2–5.8)
RBC # FLD: 14.3 % — SIGNIFICANT CHANGE UP (ref 10.3–14.5)
RBC CASTS # UR COMP ASSIST: 17 /HPF — HIGH (ref 0–4)
SODIUM SERPL-SCNC: 138 MMOL/L — SIGNIFICANT CHANGE UP (ref 135–145)
SP GR SPEC: 1.01 — SIGNIFICANT CHANGE UP (ref 1–1.03)
SQUAMOUS # UR AUTO: 3 /HPF — SIGNIFICANT CHANGE UP (ref 0–5)
UROBILINOGEN FLD QL: 1 MG/DL — SIGNIFICANT CHANGE UP (ref 0.2–1)
WBC # BLD: 5.16 K/UL — SIGNIFICANT CHANGE UP (ref 3.8–10.5)
WBC # FLD AUTO: 5.16 K/UL — SIGNIFICANT CHANGE UP (ref 3.8–10.5)
WBC UR QL: 46 /HPF — HIGH (ref 0–5)

## 2025-05-17 PROCEDURE — 99285 EMERGENCY DEPT VISIT HI MDM: CPT | Mod: FS

## 2025-05-17 PROCEDURE — 86803 HEPATITIS C AB TEST: CPT

## 2025-05-17 PROCEDURE — 99222 1ST HOSP IP/OBS MODERATE 55: CPT

## 2025-05-17 PROCEDURE — 83605 ASSAY OF LACTIC ACID: CPT

## 2025-05-17 PROCEDURE — 36415 COLL VENOUS BLD VENIPUNCTURE: CPT

## 2025-05-17 PROCEDURE — 80048 BASIC METABOLIC PNL TOTAL CA: CPT

## 2025-05-17 PROCEDURE — 82550 ASSAY OF CK (CPK): CPT

## 2025-05-17 PROCEDURE — 83735 ASSAY OF MAGNESIUM: CPT

## 2025-05-17 PROCEDURE — 80053 COMPREHEN METABOLIC PANEL: CPT

## 2025-05-17 PROCEDURE — 74177 CT ABD & PELVIS W/CONTRAST: CPT | Mod: 26

## 2025-05-17 PROCEDURE — 71045 X-RAY EXAM CHEST 1 VIEW: CPT | Mod: 26

## 2025-05-17 PROCEDURE — 84484 ASSAY OF TROPONIN QUANT: CPT

## 2025-05-17 PROCEDURE — 85652 RBC SED RATE AUTOMATED: CPT

## 2025-05-17 PROCEDURE — 84100 ASSAY OF PHOSPHORUS: CPT

## 2025-05-17 PROCEDURE — 86140 C-REACTIVE PROTEIN: CPT

## 2025-05-17 PROCEDURE — 85027 COMPLETE CBC AUTOMATED: CPT

## 2025-05-17 RX ORDER — LORAZEPAM 4 MG/ML
0.5 VIAL (ML) INJECTION ONCE
Refills: 0 | Status: DISCONTINUED | OUTPATIENT
Start: 2025-05-17 | End: 2025-05-17

## 2025-05-17 RX ORDER — SODIUM CHLORIDE 9 G/1000ML
1000 INJECTION, SOLUTION INTRAVENOUS
Refills: 0 | Status: DISCONTINUED | OUTPATIENT
Start: 2025-05-17 | End: 2025-05-19

## 2025-05-17 RX ORDER — PIPERACILLIN-TAZO-DEXTROSE,ISO 3.375G/5
3.38 IV SOLUTION, PIGGYBACK PREMIX FROZEN(ML) INTRAVENOUS ONCE
Refills: 0 | Status: COMPLETED | OUTPATIENT
Start: 2025-05-17 | End: 2025-05-17

## 2025-05-17 RX ORDER — RIVAROXABAN 10 MG/1
20 TABLET, FILM COATED ORAL ONCE
Refills: 0 | Status: COMPLETED | OUTPATIENT
Start: 2025-05-17 | End: 2025-05-17

## 2025-05-17 RX ORDER — ONDANSETRON HCL/PF 4 MG/2 ML
4 VIAL (ML) INJECTION ONCE
Refills: 0 | Status: DISCONTINUED | OUTPATIENT
Start: 2025-05-17 | End: 2025-05-19

## 2025-05-17 RX ORDER — RIVAROXABAN 10 MG/1
20 TABLET, FILM COATED ORAL
Refills: 0 | Status: DISCONTINUED | OUTPATIENT
Start: 2025-05-18 | End: 2025-05-19

## 2025-05-17 RX ORDER — MEROPENEM 1 G/30ML
INJECTION INTRAVENOUS
Refills: 0 | Status: DISCONTINUED | OUTPATIENT
Start: 2025-05-18 | End: 2025-05-19

## 2025-05-17 RX ORDER — AMLODIPINE BESYLATE 10 MG/1
5 TABLET ORAL DAILY
Refills: 0 | Status: DISCONTINUED | OUTPATIENT
Start: 2025-05-17 | End: 2025-05-19

## 2025-05-17 RX ORDER — ACETAMINOPHEN 500 MG/5ML
650 LIQUID (ML) ORAL ONCE
Refills: 0 | Status: DISCONTINUED | OUTPATIENT
Start: 2025-05-17 | End: 2025-05-19

## 2025-05-17 RX ORDER — ACETAMINOPHEN 500 MG/5ML
1000 LIQUID (ML) ORAL ONCE
Refills: 0 | Status: COMPLETED | OUTPATIENT
Start: 2025-05-17 | End: 2025-05-17

## 2025-05-17 RX ORDER — PIPERACILLIN-TAZO-DEXTROSE,ISO 3.375G/5
3.38 IV SOLUTION, PIGGYBACK PREMIX FROZEN(ML) INTRAVENOUS EVERY 8 HOURS
Refills: 0 | Status: DISCONTINUED | OUTPATIENT
Start: 2025-05-17 | End: 2025-05-17

## 2025-05-17 RX ORDER — MELATONIN 5 MG
3 TABLET ORAL AT BEDTIME
Refills: 0 | Status: DISCONTINUED | OUTPATIENT
Start: 2025-05-17 | End: 2025-05-19

## 2025-05-17 RX ORDER — MAGNESIUM, ALUMINUM HYDROXIDE 200-200 MG
30 TABLET,CHEWABLE ORAL EVERY 4 HOURS
Refills: 0 | Status: DISCONTINUED | OUTPATIENT
Start: 2025-05-17 | End: 2025-05-19

## 2025-05-17 RX ADMIN — Medication 4 MILLIGRAM(S): at 16:15

## 2025-05-17 RX ADMIN — Medication 200 GRAM(S): at 16:30

## 2025-05-17 RX ADMIN — RIVAROXABAN 20 MILLIGRAM(S): 10 TABLET, FILM COATED ORAL at 17:17

## 2025-05-17 RX ADMIN — Medication 3.38 GRAM(S): at 17:00

## 2025-05-17 RX ADMIN — Medication 400 MILLIGRAM(S): at 16:15

## 2025-05-17 RX ADMIN — Medication 1000 MILLIGRAM(S): at 16:30

## 2025-05-17 RX ADMIN — Medication 4 MILLIGRAM(S): at 17:00

## 2025-05-17 RX ADMIN — Medication 1000 MILLILITER(S): at 16:15

## 2025-05-17 RX ADMIN — Medication 1000 MILLIGRAM(S): at 17:00

## 2025-05-17 RX ADMIN — Medication 1500 MILLILITER(S): at 16:30

## 2025-05-17 RX ADMIN — Medication 10 MILLIGRAM(S): at 20:48

## 2025-05-17 NOTE — H&P ADULT - HISTORY OF PRESENT ILLNESS
69M with PMHx of BPH, valvular heart disease, HLD, paroxysmal AFib on Xarelto, hx DVT, lumbar spinal stenosis s/p laminectomy with spinal fusion, s/p cervical spinal fusion, and L hip replacement presents to ED for positive blood cultures. Pt initially presented to [facility] on 5/15 with fevers and chills following cystoscopy on 5/14. Today, blood cultures resulted positive for Citrobacter, and pt was contacted for admission for IV antibiotics. Patient otherwise reports feeling well and no acute complaints.

## 2025-05-17 NOTE — ED STATDOCS - CARE PLAN
Principal Discharge DX:	Bacteremia  Secondary Diagnosis:	Pyelonephritis   1 Principal Discharge DX:	Bacteremia  Secondary Diagnosis:	Pyelonephritis  Secondary Diagnosis:	Sepsis

## 2025-05-17 NOTE — PROVIDER CONTACT NOTE (SEPSIS SCREENING) - NOTIFICATION DETAILS (SBAR) SITUATION:
Sepsis huddle initiated, confirmed pt weight and medication dosages. Pt to receive 30cc/kg fluid, Blood cultures and lactate sent and repeat lactate ordered.

## 2025-05-17 NOTE — ED STATDOCS - PHYSICAL EXAMINATION
Physical Exam:  Gen: NAD, non-toxic appearing, able to ambulate without assistance  Head: NCAT  HEENT: EOMI, PEERLA, normal conjunctiva, tongue midline, oral mucosa moist  Lung: CTAB, no respiratory distress, no wheezes/rhonchi/rales B/L, speaking in full sentences  CV: RRR, no murmurs, rubs or gallops, distal pulses 2+ b/l  Abd: soft, no distention, no guarding, no rigidity, no rebound tenderness, b/l CVAT, L > R   MSK: no visible deformities, ROM normal in UE/LE  Skin: Warm, well perfused, no rash  Psych: normal affect, calm

## 2025-05-17 NOTE — ED STATDOCS - PROGRESS NOTE DETAILS
signed Judy Guevara PA-C Pt seen initially in intake by Dr Oshea.   69M returns to ER for positive blood cxs, citrobacter. Pt seen in ED on 5/15 for fever 102.9 s/p cystoscopy on 5/14 with uro Dr Gary Goldberg for hematuria. pt was dx with uti and offered admission but preferred to DC home on PO abx cefuroxime. Pt had fever yesterday. Got call to return to ER today. While in waiting room pt developed chills and bilateral flank pain, left worse than right. +CVA TTP left worse than right. Plan admission for IV abx. Pt agrees with admission and plan of care. signed Judy Guevara PA-C   lactate 3.2. Will treat for sepsis, IV fluids and zosyn ordered. UA +uti. No significant findings on CT. pt with clinical s/s of pyelonephritis. Case discussed with and admission accepted by hospitalist Dr. Plunkett.

## 2025-05-17 NOTE — PATIENT PROFILE ADULT - FALL HARM RISK - UNIVERSAL INTERVENTIONS
Bed in lowest position, wheels locked, appropriate side rails in place/Call bell, personal items and telephone in reach/Instruct patient to call for assistance before getting out of bed or chair/Non-slip footwear when patient is out of bed/[place] to call system/Physically safe environment - no spills, clutter or unnecessary equipment/Purposeful Proactive Rounding/Room/bathroom lighting operational, light cord in reach

## 2025-05-17 NOTE — H&P ADULT - NSHPLABSRESULTS_GEN_ALL_CORE
LABS:  cret                        15.6   5.16  )-----------( 199      ( 17 May 2025 15:57 )             47.4     05-17    138  |  104  |  11  ----------------------------<  103[H]  3.7   |  31  |  1.30    Ca    9.3      17 May 2025 15:57    TPro  8.1  /  Alb  3.7  /  TBili  2.2[H]  /  DBili  x   /  AST  22  /  ALT  24  /  AlkPhos  72  05-17    < from: CT Abdomen and Pelvis w/ IV Cont (05.17.25 @ 16:06) >    IMPRESSION: No acute abnormality.        --- End of Report ---    < end of copied text >

## 2025-05-17 NOTE — H&P ADULT - PROBLEM SELECTOR PLAN 1
Source likely 2/2 cystoscopy, possible pyelonephritis.   Pt had been taking cefuroxime as outpatient for past several days.   C/w zosyn  ID consult.

## 2025-05-17 NOTE — ED STATDOCS - OBJECTIVE STATEMENT
Pt is a 69y male with a PMHx of BPH, valvular heart disease, HLD , Paroxysmal Afib on Xarelto, hx DVT, lumbar spinal stenosis s/p laminectomy with spinal fusion, s/p cervical spinal fusion, hx L hip replacement who presents to the ED after he was called by Annamaria for positive blood cultures. The patient presented to [facility] on 05/15 regarding fevers and chills s/p cystoscopy on the 14th. Today, the patient's cultures were resulted showing Citrobacter and was called back for admission for IV antibiotics. In the ED, the patient endorsed flank pain and chills.

## 2025-05-17 NOTE — ED ADULT NURSE NOTE - CAS EDN DISCHARGE ASSESSMENT
What Type Of Note Output Would You Prefer (Optional)?: Bullet Format How Severe Is Your Rash?: mild Is This A New Presentation, Or A Follow-Up?: Rash Alert and oriented to person, place and time

## 2025-05-17 NOTE — ED ADULT NURSE NOTE - OBJECTIVE STATEMENT
pt presenting w/reports of left side flank pain new since his UTI DX on the 15th. Pt was sent home with oral ABX and then called and informed his BC's were positive and to return to the ER. Pt states he was febrile yesterday, and has chills today

## 2025-05-17 NOTE — ED STATDOCS - NSICDXPASTMEDICALHX_GEN_ALL_CORE_FT
PAST MEDICAL HISTORY:  BPH (benign prostatic hyperplasia)     Degenerative lumbar spinal stenosis     DVT (deep venous thrombosis) 5/2017    One day post-op for right Knee Arthroscopy. Currently on xarelto    HLD (hyperlipidemia)     Paroxysmal a-fib '2010 and '2012    Two episodes    Valvular heart disease      Itraconazole Counseling:  I discussed with the patient the risks of itraconazole including but not limited to liver damage, nausea/vomiting, neuropathy, and severe allergy.  The patient understands that this medication is best absorbed when taken with acidic beverages such as non-diet cola or ginger ale.  The patient understands that monitoring is required including baseline LFTs and repeat LFTs at intervals.  The patient understands that they are to contact us or the primary physician if concerning signs are noted.

## 2025-05-17 NOTE — H&P ADULT - NSHPPHYSICALEXAM_GEN_ALL_CORE
T(C): 36.1 (05-17-25 @ 20:33), Max: 37.3 (05-17-25 @ 16:30)  HR: 66 (05-17-25 @ 20:33) (66 - 79)  BP: 180/81 (05-17-25 @ 20:33) (153/76 - 181/97)  RR: 16 (05-17-25 @ 20:33) (16 - 18)  SpO2: 100% (05-17-25 @ 20:33) (98% - 100%)    General: non-toxic  HEENT: non-traumatic, perrla, eomi  Cardio: s1s2 regular rate and rhythm  Lungs: comfortable breathing, clear to auscultation  Abdomen: Soft, non-tender, non-distended  Neuro: AOx4  Ext: Pulses +2

## 2025-05-17 NOTE — ED STATDOCS - CLINICAL SUMMARY MEDICAL DECISION MAKING FREE TEXT BOX
Patient with Citrobacter bacteremia.  Likely urinary source.  Labs today are otherwise unremarkable with exception of a elevated lactate 3.2, which cleared with IV fluids.  CT scan of the abdomen pelvis was unremarkable.  Patient given IV antibiotics.  Admitted to medicine service for further evaluation management.

## 2025-05-17 NOTE — H&P ADULT - NSICDXPASTSURGICALHX_GEN_ALL_CORE_FT
Yes
PAST SURGICAL HISTORY:  H/O cervical spine surgery 1991 fusion    History of excision of pilonidal cyst ' 87    History of hydrocelectomy 2010    S/P arthroscopy of left shoulder 1996    S/P arthroscopy of right shoulder 2017    S/P right knee arthroscopy 5/2017    S/P rotator cuff repair left shoulder 1995    Status post arthroscopy of hip left 2/2020    Status post laminectomy with spinal fusion lumbar L5-S1 2018    Status post left hip replacement 11/2020

## 2025-05-18 LAB
-  AMOXICILLIN/CLAVULANIC ACID: SIGNIFICANT CHANGE UP
-  AMPICILLIN/SULBACTAM: SIGNIFICANT CHANGE UP
-  AMPICILLIN/SULBACTAM: SIGNIFICANT CHANGE UP
-  AMPICILLIN: SIGNIFICANT CHANGE UP
-  AMPICILLIN: SIGNIFICANT CHANGE UP
-  AZTREONAM: SIGNIFICANT CHANGE UP
-  AZTREONAM: SIGNIFICANT CHANGE UP
-  CEFAZOLIN: SIGNIFICANT CHANGE UP
-  CEFAZOLIN: SIGNIFICANT CHANGE UP
-  CEFEPIME: SIGNIFICANT CHANGE UP
-  CEFEPIME: SIGNIFICANT CHANGE UP
-  CEFOXITIN: SIGNIFICANT CHANGE UP
-  CEFOXITIN: SIGNIFICANT CHANGE UP
-  CEFTRIAXONE: SIGNIFICANT CHANGE UP
-  CEFTRIAXONE: SIGNIFICANT CHANGE UP
-  CIPROFLOXACIN: SIGNIFICANT CHANGE UP
-  CIPROFLOXACIN: SIGNIFICANT CHANGE UP
-  ERTAPENEM: SIGNIFICANT CHANGE UP
-  ERTAPENEM: SIGNIFICANT CHANGE UP
-  GENTAMICIN: SIGNIFICANT CHANGE UP
-  GENTAMICIN: SIGNIFICANT CHANGE UP
-  IMIPENEM: SIGNIFICANT CHANGE UP
-  IMIPENEM: SIGNIFICANT CHANGE UP
-  LEVOFLOXACIN: SIGNIFICANT CHANGE UP
-  LEVOFLOXACIN: SIGNIFICANT CHANGE UP
-  MEROPENEM: SIGNIFICANT CHANGE UP
-  MEROPENEM: SIGNIFICANT CHANGE UP
-  NITROFURANTOIN: SIGNIFICANT CHANGE UP
-  PIPERACILLIN/TAZOBACTAM: SIGNIFICANT CHANGE UP
-  PIPERACILLIN/TAZOBACTAM: SIGNIFICANT CHANGE UP
-  TIGECYCLINE: SIGNIFICANT CHANGE UP
-  TIGECYCLINE: SIGNIFICANT CHANGE UP
-  TOBRAMYCIN: SIGNIFICANT CHANGE UP
-  TOBRAMYCIN: SIGNIFICANT CHANGE UP
-  TRIMETHOPRIM/SULFAMETHOXAZOLE: SIGNIFICANT CHANGE UP
-  TRIMETHOPRIM/SULFAMETHOXAZOLE: SIGNIFICANT CHANGE UP
ALBUMIN SERPL ELPH-MCNC: 3 G/DL — LOW (ref 3.3–5)
ALBUMIN SERPL ELPH-MCNC: 3 G/DL — LOW (ref 3.3–5)
ALP SERPL-CCNC: 64 U/L — SIGNIFICANT CHANGE UP (ref 40–120)
ALP SERPL-CCNC: 72 U/L — SIGNIFICANT CHANGE UP (ref 40–120)
ALT FLD-CCNC: 21 U/L — SIGNIFICANT CHANGE UP (ref 12–78)
ALT FLD-CCNC: 22 U/L — SIGNIFICANT CHANGE UP (ref 12–78)
ANION GAP SERPL CALC-SCNC: 0 MMOL/L — LOW (ref 5–17)
ANION GAP SERPL CALC-SCNC: 5 MMOL/L — SIGNIFICANT CHANGE UP (ref 5–17)
AST SERPL-CCNC: 19 U/L — SIGNIFICANT CHANGE UP (ref 15–37)
AST SERPL-CCNC: 21 U/L — SIGNIFICANT CHANGE UP (ref 15–37)
BILIRUB SERPL-MCNC: 1.2 MG/DL — SIGNIFICANT CHANGE UP (ref 0.2–1.2)
BILIRUB SERPL-MCNC: 1.4 MG/DL — HIGH (ref 0.2–1.2)
BUN SERPL-MCNC: 11 MG/DL — SIGNIFICANT CHANGE UP (ref 7–23)
BUN SERPL-MCNC: 15 MG/DL — SIGNIFICANT CHANGE UP (ref 7–23)
CALCIUM SERPL-MCNC: 8.5 MG/DL — SIGNIFICANT CHANGE UP (ref 8.5–10.1)
CALCIUM SERPL-MCNC: 8.9 MG/DL — SIGNIFICANT CHANGE UP (ref 8.5–10.1)
CHLORIDE SERPL-SCNC: 107 MMOL/L — SIGNIFICANT CHANGE UP (ref 96–108)
CHLORIDE SERPL-SCNC: 112 MMOL/L — HIGH (ref 96–108)
CK SERPL-CCNC: 84 U/L — SIGNIFICANT CHANGE UP (ref 26–308)
CO2 SERPL-SCNC: 25 MMOL/L — SIGNIFICANT CHANGE UP (ref 22–31)
CO2 SERPL-SCNC: 29 MMOL/L — SIGNIFICANT CHANGE UP (ref 22–31)
CREAT SERPL-MCNC: 1.19 MG/DL — SIGNIFICANT CHANGE UP (ref 0.5–1.3)
CREAT SERPL-MCNC: 1.34 MG/DL — HIGH (ref 0.5–1.3)
CRP SERPL-MCNC: 75.2 MG/L — HIGH (ref 0–5)
CULTURE RESULTS: ABNORMAL
CULTURE RESULTS: ABNORMAL
EGFR: 57 ML/MIN/1.73M2 — LOW
EGFR: 57 ML/MIN/1.73M2 — LOW
EGFR: 65 ML/MIN/1.73M2 — SIGNIFICANT CHANGE UP
EGFR: 65 ML/MIN/1.73M2 — SIGNIFICANT CHANGE UP
ERYTHROCYTE [SEDIMENTATION RATE] IN BLOOD: 15 MM/HR — SIGNIFICANT CHANGE UP (ref 0–15)
GLUCOSE SERPL-MCNC: 137 MG/DL — HIGH (ref 70–99)
GLUCOSE SERPL-MCNC: 179 MG/DL — HIGH (ref 70–99)
HCT VFR BLD CALC: 41.4 % — SIGNIFICANT CHANGE UP (ref 39–50)
HCT VFR BLD CALC: 41.4 % — SIGNIFICANT CHANGE UP (ref 39–50)
HCV AB S/CO SERPL IA: 0.13 S/CO — SIGNIFICANT CHANGE UP (ref 0–0.79)
HCV AB SERPL-IMP: SIGNIFICANT CHANGE UP
HGB BLD-MCNC: 13.7 G/DL — SIGNIFICANT CHANGE UP (ref 13–17)
HGB BLD-MCNC: 13.9 G/DL — SIGNIFICANT CHANGE UP (ref 13–17)
LACTATE SERPL-SCNC: 1.3 MMOL/L — SIGNIFICANT CHANGE UP (ref 0.7–2)
MAGNESIUM SERPL-MCNC: 2.2 MG/DL — SIGNIFICANT CHANGE UP (ref 1.6–2.6)
MCHC RBC-ENTMCNC: 30 PG — SIGNIFICANT CHANGE UP (ref 27–34)
MCHC RBC-ENTMCNC: 30.7 PG — SIGNIFICANT CHANGE UP (ref 27–34)
MCHC RBC-ENTMCNC: 33.1 G/DL — SIGNIFICANT CHANGE UP (ref 32–36)
MCHC RBC-ENTMCNC: 33.6 G/DL — SIGNIFICANT CHANGE UP (ref 32–36)
MCV RBC AUTO: 90.8 FL — SIGNIFICANT CHANGE UP (ref 80–100)
MCV RBC AUTO: 91.4 FL — SIGNIFICANT CHANGE UP (ref 80–100)
METHOD TYPE: SIGNIFICANT CHANGE UP
METHOD TYPE: SIGNIFICANT CHANGE UP
NRBC # BLD AUTO: 0 K/UL — SIGNIFICANT CHANGE UP (ref 0–0)
NRBC # BLD AUTO: 0 K/UL — SIGNIFICANT CHANGE UP (ref 0–0)
NRBC # FLD: 0 K/UL — SIGNIFICANT CHANGE UP (ref 0–0)
NRBC # FLD: 0 K/UL — SIGNIFICANT CHANGE UP (ref 0–0)
NRBC BLD AUTO-RTO: 0 /100 WBCS — SIGNIFICANT CHANGE UP (ref 0–0)
NRBC BLD AUTO-RTO: 0 /100 WBCS — SIGNIFICANT CHANGE UP (ref 0–0)
ORGANISM # SPEC MICROSCOPIC CNT: ABNORMAL
ORGANISM # SPEC MICROSCOPIC CNT: SIGNIFICANT CHANGE UP
ORGANISM # SPEC MICROSCOPIC CNT: SIGNIFICANT CHANGE UP
PHOSPHATE SERPL-MCNC: 1.2 MG/DL — LOW (ref 2.5–4.5)
PLATELET # BLD AUTO: 187 K/UL — SIGNIFICANT CHANGE UP (ref 150–400)
PLATELET # BLD AUTO: 194 K/UL — SIGNIFICANT CHANGE UP (ref 150–400)
PMV BLD: 10.5 FL — SIGNIFICANT CHANGE UP (ref 7–13)
PMV BLD: 11 FL — SIGNIFICANT CHANGE UP (ref 7–13)
POTASSIUM SERPL-MCNC: 4.3 MMOL/L — SIGNIFICANT CHANGE UP (ref 3.5–5.3)
POTASSIUM SERPL-MCNC: 4.5 MMOL/L — SIGNIFICANT CHANGE UP (ref 3.5–5.3)
POTASSIUM SERPL-SCNC: 4.3 MMOL/L — SIGNIFICANT CHANGE UP (ref 3.5–5.3)
POTASSIUM SERPL-SCNC: 4.5 MMOL/L — SIGNIFICANT CHANGE UP (ref 3.5–5.3)
PROT SERPL-MCNC: 6.3 GM/DL — SIGNIFICANT CHANGE UP (ref 6–8.3)
PROT SERPL-MCNC: 6.6 GM/DL — SIGNIFICANT CHANGE UP (ref 6–8.3)
RBC # BLD: 4.53 M/UL — SIGNIFICANT CHANGE UP (ref 4.2–5.8)
RBC # BLD: 4.56 M/UL — SIGNIFICANT CHANGE UP (ref 4.2–5.8)
RBC # FLD: 14.2 % — SIGNIFICANT CHANGE UP (ref 10.3–14.5)
RBC # FLD: 14.4 % — SIGNIFICANT CHANGE UP (ref 10.3–14.5)
SODIUM SERPL-SCNC: 137 MMOL/L — SIGNIFICANT CHANGE UP (ref 135–145)
SODIUM SERPL-SCNC: 141 MMOL/L — SIGNIFICANT CHANGE UP (ref 135–145)
SPECIMEN SOURCE: SIGNIFICANT CHANGE UP
SPECIMEN SOURCE: SIGNIFICANT CHANGE UP
TROPONIN I, HIGH SENSITIVITY RESULT: 8.3 NG/L — SIGNIFICANT CHANGE UP
WBC # BLD: 4.63 K/UL — SIGNIFICANT CHANGE UP (ref 3.8–10.5)
WBC # BLD: 5.66 K/UL — SIGNIFICANT CHANGE UP (ref 3.8–10.5)
WBC # FLD AUTO: 4.63 K/UL — SIGNIFICANT CHANGE UP (ref 3.8–10.5)
WBC # FLD AUTO: 5.66 K/UL — SIGNIFICANT CHANGE UP (ref 3.8–10.5)

## 2025-05-18 PROCEDURE — 99232 SBSQ HOSP IP/OBS MODERATE 35: CPT

## 2025-05-18 RX ORDER — MEROPENEM 1 G/30ML
1000 INJECTION INTRAVENOUS ONCE
Refills: 0 | Status: COMPLETED | OUTPATIENT
Start: 2025-05-18 | End: 2025-05-18

## 2025-05-18 RX ORDER — CARVEDILOL 3.12 MG/1
12.5 TABLET, FILM COATED ORAL EVERY 12 HOURS
Refills: 0 | Status: DISCONTINUED | OUTPATIENT
Start: 2025-05-18 | End: 2025-05-19

## 2025-05-18 RX ORDER — SODIUM PHOSPHATE,DIBASIC DIHYD
30 POWDER (GRAM) MISCELLANEOUS ONCE
Refills: 0 | Status: COMPLETED | OUTPATIENT
Start: 2025-05-18 | End: 2025-05-18

## 2025-05-18 RX ORDER — METHYLPREDNISOLONE ACETATE 80 MG/ML
40 INJECTION, SUSPENSION INTRA-ARTICULAR; INTRALESIONAL; INTRAMUSCULAR; SOFT TISSUE ONCE
Refills: 0 | Status: COMPLETED | OUTPATIENT
Start: 2025-05-18 | End: 2025-05-18

## 2025-05-18 RX ORDER — MEROPENEM 1 G/30ML
1000 INJECTION INTRAVENOUS EVERY 8 HOURS
Refills: 0 | Status: DISCONTINUED | OUTPATIENT
Start: 2025-05-18 | End: 2025-05-19

## 2025-05-18 RX ADMIN — AMLODIPINE BESYLATE 5 MILLIGRAM(S): 10 TABLET ORAL at 00:11

## 2025-05-18 RX ADMIN — CARVEDILOL 12.5 MILLIGRAM(S): 3.12 TABLET, FILM COATED ORAL at 22:21

## 2025-05-18 RX ADMIN — MEROPENEM 1000 MILLIGRAM(S): 1 INJECTION INTRAVENOUS at 00:53

## 2025-05-18 RX ADMIN — SODIUM CHLORIDE 100 MILLILITER(S): 9 INJECTION, SOLUTION INTRAVENOUS at 01:01

## 2025-05-18 RX ADMIN — Medication 85 MILLIMOLE(S): at 14:12

## 2025-05-18 RX ADMIN — RIVAROXABAN 20 MILLIGRAM(S): 10 TABLET, FILM COATED ORAL at 16:48

## 2025-05-18 RX ADMIN — MEROPENEM 1000 MILLIGRAM(S): 1 INJECTION INTRAVENOUS at 22:19

## 2025-05-18 RX ADMIN — CARVEDILOL 12.5 MILLIGRAM(S): 3.12 TABLET, FILM COATED ORAL at 00:53

## 2025-05-18 RX ADMIN — MEROPENEM 1000 MILLIGRAM(S): 1 INJECTION INTRAVENOUS at 05:49

## 2025-05-18 RX ADMIN — Medication 0.5 MILLIGRAM(S): at 00:11

## 2025-05-18 RX ADMIN — MEROPENEM 1000 MILLIGRAM(S): 1 INJECTION INTRAVENOUS at 14:21

## 2025-05-18 RX ADMIN — METHYLPREDNISOLONE ACETATE 40 MILLIGRAM(S): 80 INJECTION, SUSPENSION INTRA-ARTICULAR; INTRALESIONAL; INTRAMUSCULAR; SOFT TISSUE at 00:49

## 2025-05-18 NOTE — PROVIDER CONTACT NOTE (OTHER) - SITUATION
Patient reports change in skin color/redness of neck and face and feeling restless. Pt hypertensive with /83. Patient denies SOB, chest pain, itching. Pt reports feeling a "welt" forming on face

## 2025-05-18 NOTE — CHART NOTE - NSCHARTNOTEFT_GEN_A_CORE
Pt with no hx of HTN, was htnsive on admission, received hydralazine and zosyn.  Later patient had worsening HTN with SBP in 190s, and developed flushing of his neck and head with wheals on his cheeks.  Patient evaluated at bedside, with anxiety, tremulous, redness of face and neck, blanching.   Possible allergic reaction zosyn vs hydralazine.  Changed zosyn to meropenem  Started on coreg and amlodipine.  Will give 40 mg IV solumedrol.  0.5 mg IV ativan.

## 2025-05-18 NOTE — PROVIDER CONTACT NOTE (OTHER) - BACKGROUND
pt admitted for s/s UTI and hematuria. Elevated lactate in ED, received NS bolus, 1x dose zosyn in ED. Pt received 10 mg IVP hydralazine @ 20:48 for HTN upon arrival to unit.

## 2025-05-18 NOTE — PROGRESS NOTE ADULT - ASSESSMENT
MEDICATIONS  (STANDING):  amLODIPine   Tablet 5 milliGRAM(s) Oral daily  carvedilol 12.5 milliGRAM(s) Oral every 12 hours  lactated ringers. 1000 milliLiter(s) (100 mL/Hr) IV Continuous <Continuous>  meropenem Injectable      meropenem Injectable 1000 milliGRAM(s) IV Push every 8 hours  rivaroxaban 20 milliGRAM(s) Oral with dinner    MEDICATIONS  (PRN):  acetaminophen     Tablet .. 650 milliGRAM(s) Oral once PRN Temp greater or equal to 38C (100.4F), Mild Pain (1 - 3)  aluminum hydroxide/magnesium hydroxide/simethicone Suspension 30 milliLiter(s) Oral every 4 hours PRN Dyspepsia  melatonin 3 milliGRAM(s) Oral at bedtime PRN Insomnia  morphine  - Injectable 4 milliGRAM(s) IV Push once PRN Severe Pain (7 - 10)  ondansetron Injectable 4 milliGRAM(s) IV Push once PRN Nausea and/or Vomiting      Citrobacter Bacteremia.   Citrobacter UTI  - Source likely 2/2 cystoscopy, possible pyelonephritis.   - Pt had been taking cefuroxime as outpatient for past several days.   - initially with zosyn->possible reaction->changed to merrem (5/18)  - ID consult/recs    Paroxysmal atrial fibrillation.   -C/w Xarelto & coreg    DVT ppx: xarelto.    5/18: No acute events. Tolerating antibiotics. Zosyn to merrem in possible reaction to zosyn. ID consult/recs. Follow cultures. Vitals stable. Continue to monitor.

## 2025-05-18 NOTE — PROVIDER CONTACT NOTE (OTHER) - ACTION/TREATMENT ORDERED:
Provider d/c zosyn, ordered Meropenem, amlodipine, coreg, solumedrol, and ativan. Medicated as per EMR. 0 = swallows foods/liquids without difficulty

## 2025-05-18 NOTE — PROGRESS NOTE ADULT - SUBJECTIVE AND OBJECTIVE BOX
CHIEF COMPLAINT: Weakness/Fevers    SUBJECTIVE/SIGNIFICANT INTERVAL EVENTS/OVERNIGHT EVENTS:    5/18: No acute events. Tolerating antibiotics. Zosyn to merrem in possible reaction to zosyn. ID consult/recs. Follow cultures. Vitals stable. Continue to monitor.     Review of Systems: 14 Point review of systems reviewed and reported as negative unless otherwise stated above    FROM H&P:  "69M with PMHx of BPH, valvular heart disease, HLD, paroxysmal AFib on Xarelto, hx DVT, lumbar spinal stenosis s/p laminectomy with spinal fusion, s/p cervical spinal fusion, and L hip replacement presents to ED for positive blood cultures. Pt initially presented to [facility] on 5/15 with fevers and chills following cystoscopy on 5/14. Today, blood cultures resulted positive for Citrobacter, and pt was contacted for admission for IV antibiotics. Patient otherwise reports feeling well and no acute complaints. "    PHYSICAL EXAM:    T(C): 36.8 (05-18-25 @ 15:47), Max: 37.4 (05-17-25 @ 22:07)  HR: 59 (05-18-25 @ 15:47) (59 - 76)  BP: 122/71 (05-18-25 @ 15:47) (95/75 - 195/83)  RR: 18 (05-18-25 @ 15:47) (16 - 18)  SpO2: 98% (05-18-25 @ 15:47) (97% - 100%)    General: AAOx3; NAD  Head: AT/NC  ENT: Moist Mucous Membranes; No Injury  Eyes: EOMI; PERRL  Neck: Non-tender; No JVD  CVS: RRR, S1&S2, No murmur, No edema  Respiratory: Lungs CTA B/L; Normal Respiratory Effort  Abdomen/GI: Soft, non-tender, non-distended, no guarding, no rebound, normal bowel sounds  : No bladder distention, No Guy  Extremities: No cyanosis, No clubbing, No edema  MSK: No CVA tenderness, Normal ROM, No injury  Neuro: AAOx3, CNII-XII grossly intact, non-focal  Psych: Appropriate, Cooperative, No depression, No anxiety  Skin: Clean, Dry and Intact      LABS:                          13.9   4.63  )-----------( 194      ( 18 May 2025 06:54 )             41.4     05-18    137  |  107  |  15  ----------------------------<  179[H]  4.5   |  25  |  1.19    Ca    8.9      18 May 2025 06:54  Phos  1.2     05-18  Mg     2.2     05-18    TPro  6.6  /  Alb  3.0[L]  /  TBili  1.2  /  DBili  x   /  AST  19  /  ALT  21  /  AlkPhos  72  05-18      CAPILLARY BLOOD GLUCOSE    Lactate, Blood (05.18.25 @ 00:22)   Lactate, Blood: 1.3 mmol/LC-Reactive Protein (05.18.25 @ 00:22)   C-Reactive Protein: 75.2 mg/LSedimentation Rate, Erythrocyte (05.18.25 @ 00:22)   Sedimentation Rate, Erythrocyte: 15 mm/hrTroponin I, High Sensitivity (05.18.25 @ 00:22)   Troponin I, High Sensitivity Result: 8.30      RADIOLOGY:  < from: Xray Chest 1 View AP/PA. (05.17.25 @ 16:39) >    IMPRESSION: No acute cardiopulmonary disease process.    < end of copied text >  < from: CT Abdomen and Pelvis w/ IV Cont (05.17.25 @ 16:06) >    IMPRESSION: No acute abnormality.    < end of copied text >            I personally reviewed labs, imaging, ekg, orders and vitals.    Discussed case with:  [X]RN  [X]CM/SW  [X]Patient  []Family  []Specialist:

## 2025-05-18 NOTE — PHARMACOTHERAPY INTERVENTION NOTE - COMMENTS
Medication history complete, reviewed medications with patient and confirmed with doctor first med.

## 2025-05-19 ENCOUNTER — TRANSCRIPTION ENCOUNTER (OUTPATIENT)
Age: 70
End: 2025-05-19

## 2025-05-19 VITALS
OXYGEN SATURATION: 98 % | RESPIRATION RATE: 17 BRPM | DIASTOLIC BLOOD PRESSURE: 78 MMHG | TEMPERATURE: 98 F | HEART RATE: 52 BPM | SYSTOLIC BLOOD PRESSURE: 157 MMHG

## 2025-05-19 LAB
ANION GAP SERPL CALC-SCNC: 3 MMOL/L — LOW (ref 5–17)
BUN SERPL-MCNC: 18 MG/DL — SIGNIFICANT CHANGE UP (ref 7–23)
CALCIUM SERPL-MCNC: 8.9 MG/DL — SIGNIFICANT CHANGE UP (ref 8.5–10.1)
CHLORIDE SERPL-SCNC: 109 MMOL/L — HIGH (ref 96–108)
CO2 SERPL-SCNC: 27 MMOL/L — SIGNIFICANT CHANGE UP (ref 22–31)
CREAT SERPL-MCNC: 1.04 MG/DL — SIGNIFICANT CHANGE UP (ref 0.5–1.3)
CULTURE RESULTS: NO GROWTH — SIGNIFICANT CHANGE UP
EGFR: 77 ML/MIN/1.73M2 — SIGNIFICANT CHANGE UP
EGFR: 77 ML/MIN/1.73M2 — SIGNIFICANT CHANGE UP
GLUCOSE SERPL-MCNC: 123 MG/DL — HIGH (ref 70–99)
MAGNESIUM SERPL-MCNC: 2.2 MG/DL — SIGNIFICANT CHANGE UP (ref 1.6–2.6)
PHOSPHATE SERPL-MCNC: 3 MG/DL — SIGNIFICANT CHANGE UP (ref 2.5–4.5)
POTASSIUM SERPL-MCNC: 3.5 MMOL/L — SIGNIFICANT CHANGE UP (ref 3.5–5.3)
POTASSIUM SERPL-SCNC: 3.5 MMOL/L — SIGNIFICANT CHANGE UP (ref 3.5–5.3)
SODIUM SERPL-SCNC: 139 MMOL/L — SIGNIFICANT CHANGE UP (ref 135–145)
SPECIMEN SOURCE: SIGNIFICANT CHANGE UP

## 2025-05-19 PROCEDURE — 99239 HOSP IP/OBS DSCHRG MGMT >30: CPT

## 2025-05-19 RX ORDER — AMLODIPINE BESYLATE 10 MG/1
1 TABLET ORAL
Qty: 30 | Refills: 0
Start: 2025-05-19

## 2025-05-19 RX ORDER — CIPROFLOXACIN HCL 250 MG
1 TABLET ORAL
Qty: 28 | Refills: 0
Start: 2025-05-19 | End: 2025-06-01

## 2025-05-19 RX ORDER — CARVEDILOL 3.12 MG/1
1 TABLET, FILM COATED ORAL
Qty: 60 | Refills: 0
Start: 2025-05-19

## 2025-05-19 RX ADMIN — AMLODIPINE BESYLATE 5 MILLIGRAM(S): 10 TABLET ORAL at 10:12

## 2025-05-19 RX ADMIN — CARVEDILOL 12.5 MILLIGRAM(S): 3.12 TABLET, FILM COATED ORAL at 10:12

## 2025-05-19 RX ADMIN — MEROPENEM 1000 MILLIGRAM(S): 1 INJECTION INTRAVENOUS at 05:12

## 2025-05-19 NOTE — DISCHARGE NOTE NURSING/CASE MANAGEMENT/SOCIAL WORK - NSDCPNINST_GEN_ALL_CORE
Thank you for choosing [facility]. It has been our pleasure taking care of you. Please let us know if you have any questions, concerns or needs. For a complete copy of medical records please visit : [email or web address]

## 2025-05-19 NOTE — CONSULT NOTE ADULT - SUBJECTIVE AND OBJECTIVE BOX
CHIEF COMPLAINT: Patient is a 69y old  Male who presents with a chief complaint of bacteremia/UTI (18 May 2025 18:14)      HPI:  69M with PMHx of BPH, valvular heart disease, HLD, paroxysmal AFib on Xarelto, hx DVT, lumbar spinal stenosis s/p laminectomy with spinal fusion, s/p cervical spinal fusion, and L hip replacement presents to ED for positive blood cultures. Pt initially presented to [facility] on 5/15 with fevers and chills following cystoscopy on 5/14. Today, blood cultures resulted positive for Citrobacter, and pt was contacted for admission for IV antibiotics. Patient otherwise reports feeling well and no acute complaints.     5/19-patient admitted for UTI/citrobacter bacteremia after cystoscopy; no prior bioprosthetic valve and no significant feelings for endocarditis at this time.  PAtient developed urgent hypertension and allergic reaction to medicaitons and placed on telemetry.  Started on coreg and amlodipine with improved BP and symptoms.        (17 May 2025 22:34)      PMHx: PAST MEDICAL & SURGICAL HISTORY:  Paroxysmal a-fib  '2010 and '2012    Two episodes      Degenerative lumbar spinal stenosis      DVT (deep venous thrombosis)  5/2017    One day post-op for right Knee Arthroscopy. Currently on xarelto      HLD (hyperlipidemia)      Valvular heart disease      BPH (benign prostatic hyperplasia)      History of excision of pilonidal cyst  ' 85      Status post laminectomy with spinal fusion  lumbar L5-S1 2018      S/P right knee arthroscopy  5/2017      S/P rotator cuff repair  left shoulder 1995      History of hydrocelectomy  2010      H/O cervical spine surgery  1991 fusion      Status post arthroscopy of hip  left 2/2020      Status post left hip replacement  11/2020      S/P arthroscopy of right shoulder  2017      S/P arthroscopy of left shoulder  1996            Soc Hx:       Allergies: Allergies    Zosyn (Hives)  hydrALAZINE (Hives)    Intolerances          REVIEW OF SYSTEMS:    CONSTITUTIONAL: weakness, fevers or chills  EYES/ENT: No visual changes;  No vertigo or throat pain   NECK: No pain or stiffness  RESPIRATORY: No cough, wheezing, hemoptysis; No shortness of breath  CARDIOVASCULAR: No chest pain or palpitations  GASTROINTESTINAL: No abdominal or epigastric pain. No nausea, vomiting, or hematemesis; No diarrhea or constipation. No melena or hematochezia.  GENITOURINARY: No dysuria, frequency or hematuria  NEUROLOGICAL: No numbness or weakness  SKIN: No itching, burning, rashes, or lesions   All other review of systems is negative unless indicated above    Vital Signs Last 24 Hrs  T(C): 36.6 (19 May 2025 05:09), Max: 36.8 (18 May 2025 07:59)  T(F): 97.9 (19 May 2025 05:09), Max: 98.3 (18 May 2025 07:59)  HR: 64 (19 May 2025 05:09) (56 - 64)  BP: 144/82 (19 May 2025 05:09) (95/75 - 144/82)  BP(mean): --  RR: 18 (19 May 2025 05:09) (18 - 18)  SpO2: 94% (19 May 2025 05:09) (94% - 98%)    Parameters below as of 19 May 2025 05:09  Patient On (Oxygen Delivery Method): room air        I&O's Summary      CAPILLARY BLOOD GLUCOSE          PHYSICAL EXAM:   Constitutional: NAD, awake and alert, well-developed  HEENT: PERR, EOMI, Normal Hearing, MMM  Neck: Soft and supple, No LAD, No JVD  Respiratory: Breath sounds are clear bilaterally, No wheezing, rales or rhonchi  Cardiovascular: S1 and S2, regular rate and rhythm, Murmurs, gallops or rubs  Gastrointestinal: Bowel Sounds present, soft, nontender, nondistended, no guarding, no rebound  Extremities: No peripheral edema  Vascular: 2+ peripheral pulses  Neurological: A/O x 3, no focal deficits  Musculoskeletal: 5/5 strength b/l upper and lower extremities  Skin: No rashes    MEDICATIONS:  MEDICATIONS  (STANDING):  amLODIPine   Tablet 5 milliGRAM(s) Oral daily  carvedilol 12.5 milliGRAM(s) Oral every 12 hours  lactated ringers. 1000 milliLiter(s) (100 mL/Hr) IV Continuous <Continuous>  meropenem Injectable      meropenem Injectable 1000 milliGRAM(s) IV Push every 8 hours  rivaroxaban 20 milliGRAM(s) Oral with dinner      LABS: All Labs Reviewed:                        13.9   4.63  )-----------( 194      ( 18 May 2025 06:54 )             41.4     05-18    137  |  107  |  15  ----------------------------<  179[H]  4.5   |  25  |  1.19    Ca    8.9      18 May 2025 06:54  Phos  1.2     05-18  Mg     2.2     05-18    TPro  6.6  /  Alb  3.0[L]  /  TBili  1.2  /  DBili  x   /  AST  19  /  ALT  21  /  AlkPhos  72  05-18            Blood Culture: Organism --  Gram Stain Blood -- Gram Stain --  Specimen Source Blood None  Culture-Blood --    Organism Blood Culture PCR  Gram Stain Blood -- Gram Stain   Growth in anaerobic bottle: Gram Negative Rods  Specimen Source Blood None  Culture-Blood --      lipid profile       RADIOLOGY:    EKG:    Telemetry:    ECHO:    
Patient is a 69y old  Male who presents with a chief complaint of   HPI:  69M with PMHx of BPH, valvular heart disease, HLD, paroxysmal AFib on Xarelto, hx DVT, lumbar spinal stenosis s/p laminectomy with spinal fusion, s/p cervical spinal fusion, and L hip replacement presents to ED for positive blood cultures. Pt initially presented to [facility] on 5/15 with fevers and chills following cystoscopy on 5/14. Today, blood cultures resulted positive for Citrobacter, and pt was contacted for admission for IV antibiotics. Patient otherwise reports feeling well and no acute complaints. (17 May 2025 22:34)  Above HPI reviewed and reconciled with patient    69M with BPH, valvular heart disease, HLD, paroxysmal AFib on Xarelto, hx DVT, lumbar spinal stenosis s/p laminectomy with spinal fusion, s/p cervical spinal fusion, and L hip replacement, initially presented to ED 5/15 with fever and chills after cystoscopy on 5/14, diagnosed with UTI and discharged on antibiotic, called back to ED 5/17 after BCx and UCx growing Citrobacter  Patient reports feeling better, no fever or chills, eventually started on IV zosyn empirically   Course complicated by rash after infusion of hydralazine and zosyn, unclear which caused it  Afebrile on admission, on RA  No leukocytosis  Cr 1.1  UCx 5/15 Citrobacter koseri  BCx 5/15 Citrobacter koseri  CTAP negative    PAST MEDICAL & SURGICAL HISTORY:  Paroxysmal a-fib  '2010 and '2012    Two episodes      Degenerative lumbar spinal stenosis      DVT (deep venous thrombosis)  5/2017    One day post-op for right Knee Arthroscopy. Currently on xarelto      HLD (hyperlipidemia)      Valvular heart disease      BPH (benign prostatic hyperplasia)      History of excision of pilonidal cyst  ' 85      Status post laminectomy with spinal fusion  lumbar L5-S1 2018      S/P right knee arthroscopy  5/2017      S/P rotator cuff repair  left shoulder 1995      History of hydrocelectomy  2010      H/O cervical spine surgery  1991 fusion      Status post arthroscopy of hip  left 2/2020      Status post left hip replacement  11/2020      S/P arthroscopy of right shoulder  2017      S/P arthroscopy of left shoulder  1996        FAMILY HISTORY:  Melanoma (Father)    Family history of stroke (Sibling)      Social Hx: Denies alcohol, tobacco, recreational drug use    Allergies  Zosyn (Hives)  hydrALAZINE (Hives)    ANTIMICROBIALS (past 90 days)  MEDICATIONS  (STANDING):    meropenem Injectable   1000 milliGRAM(s) IV Push (05-18-25 @ 00:53)    meropenem Injectable   1000 milliGRAM(s) IV Push (05-18-25 @ 05:49)    piperacillin/tazobactam IVPB...   200 mL/Hr IV Intermittent (05-17-25 @ 16:30)        ACTIVE ANTIMICROBIALS  meropenem Injectable    meropenem Injectable 1000 every 8 hours (5/18 --- )    MEDICATIONS  (STANDING):  acetaminophen     Tablet .. 650 once PRN  aluminum hydroxide/magnesium hydroxide/simethicone Suspension 30 every 4 hours PRN  amLODIPine   Tablet 5 daily  carvedilol 12.5 every 12 hours  cloNIDine 0.1 once  melatonin 3 at bedtime PRN  morphine  - Injectable 4 once PRN  ondansetron Injectable 4 once PRN  rivaroxaban 20 with dinner      REVIEW OF SYSTEMS  [  ] ROS unobtainable because:    [ x ] All other systems negative except as noted below    Constitutional:  [ ] fever [ ] chills  [ ] weight loss  [ ]night sweat  [ ]poor appetite/PO intake [ ]fatigue   Skin:  [ ] rash [ ] phlebitis	  Eyes: [ ] icterus [ ] pain  [ ] discharge	  ENMT: [ ] sore throat  [ ] thrush [ ] ulcers [ ] exudates [ ]anosmia  Respiratory: [ ] dyspnea [ ] hemoptysis [ ] cough [ ] sputum	  Cardiovascular:  [ ] chest pain [ ] palpitations [ ] edema	  Gastrointestinal:  [ ] nausea [ ] vomiting [ ] diarrhea [ ] constipation [ ] pain	  Genitourinary:  [ ] dysuria [ ] frequency [ ] hematuria [ ] discharge [ ] flank pain  [ ] incontinence  Musculoskeletal:  [ ] myalgias [ ] arthralgias [ ] arthritis  [ ] back pain  Neurological:  [ ] headache [ ] weakness [ ] seizures  [ ] confusion/altered mental status    Vital Signs Last 24 Hrs  T(C): 36.8 (18 May 2025 05:29), Max: 37.4 (17 May 2025 22:07)  T(F): 98.2 (18 May 2025 05:29), Max: 99.3 (17 May 2025 22:07)  HR: 62 (18 May 2025 05:29) (62 - 79)  BP: 132/71 (18 May 2025 05:29) (132/71 - 195/83)  BP(mean): 115 (17 May 2025 23:30) (94 - 119)  RR: 18 (18 May 2025 05:29) (16 - 18)  SpO2: 97% (18 May 2025 05:29) (97% - 100%)    Parameters below as of 18 May 2025 05:29  Patient On (Oxygen Delivery Method): room air        Physical Exam:  Constitutional:  frail, NAD  Head/Eyes: no icterus  LUNGS:  CTA  CVS:  regular rhythm  Abd:  soft, non-tender; non-distended  Ext:  no edema  Vascular:  IV site no erythema tenderness or discharge  Neuro: AAO X 3, non- focal    Labs: all available labs reviewed                        13.9   4.63  )-----------( 194      ( 18 May 2025 06:54 )             41.4     05-18    137  |  107  |  15  ----------------------------<  179[H]  4.5   |  25  |  1.19    Ca    8.9      18 May 2025 06:54  Phos  1.2     05-18  Mg     2.2     05-18    TPro  6.6  /  Alb  3.0[L]  /  TBili  1.2  /  DBili  x   /  AST  19  /  ALT  21  /  AlkPhos  72  05-18     LIVER FUNCTIONS - ( 18 May 2025 06:54 )  Alb: 3.0 g/dL / Pro: 6.6 gm/dL / ALK PHOS: 72 U/L / ALT: 21 U/L / AST: 19 U/L / GGT: x           Urinalysis Basic - ( 18 May 2025 06:54 )    Color: x / Appearance: x / SG: x / pH: x  Gluc: 179 mg/dL / Ketone: x  / Bili: x / Urobili: x   Blood: x / Protein: x / Nitrite: x   Leuk Esterase: x / RBC: x / WBC x   Sq Epi: x / Non Sq Epi: x / Bacteria: x          Radiology: all available radiological tests reviewed    Advanced directives addressed: full resuscitation

## 2025-05-19 NOTE — DISCHARGE NOTE NURSING/CASE MANAGEMENT/SOCIAL WORK - FINANCIAL ASSISTANCE
[facility] provides services at a reduced cost to those who are determined to be eligible through [facility]’s financial assistance program. Information regarding [facility]’s financial assistance program can be found by going to [email or web address] or by calling [phone].

## 2025-05-19 NOTE — DISCHARGE NOTE NURSING/CASE MANAGEMENT/SOCIAL WORK - PATIENT PORTAL LINK FT
You can access the FollowMyHealth Patient Portal offered by [facility] by registering at the following website: [email or web address]. By joining [facility]’s FollowMyHealth portal, you will also be able to view your health information using other applications (apps) compatible with our system.

## 2025-05-19 NOTE — DISCHARGE NOTE PROVIDER - CARE PROVIDER_API CALL
Keren Odonnell  Cardiovascular Disease  [address], [address]  [place] [address]  Phone: [phone]  Fax: [phone]  Established Patient  Follow Up Time: 1 week

## 2025-05-19 NOTE — DISCHARGE NOTE PROVIDER - NSDCCPCAREPLAN_GEN_ALL_CORE_FT
PRINCIPAL DISCHARGE DIAGNOSIS  Diagnosis: Bacteremia  Assessment and Plan of Treatment: Repeat blood cultures negative x 48 hours. Still technically with 5 days growth period allowance but at 48 hours jameel with decent certainity of no growth. Complete course of oral antibiotics (Ciprofloxaxin). Of note FDA with black box warning for Cipro. If any symptoms arise, discontinue medication and seek further medical evaluation. Ciprofloxacin Black Box Warning: Fluoroquinolones have been associated with disabling and potentially irreversible serious adverse reactions that have occurred together, including: tendinopathy and tendon rupture, peripheral neuropathy, and CNS effects. Discontinue ciprofloxacin immediately and avoid the use of fluoroquinolones in patients who experience any of these serious adverse reactions. Because fluoroquinolones have been associated with serious adverse reactions, reserve ciprofloxacin for use in patients who have no alternative treatment options for the following indications: acute exacerbation of chronic bronchitis, acute sinusitis, and acute uncomplicated cystitis.      SECONDARY DISCHARGE DIAGNOSES  Diagnosis: Hypertension  Assessment and Plan of Treatment: Your blood pressure was very uncontrolled on presentation and during the hospitalization. Blood pressure medications started and titrated for improved blood pressure control. Important to control blood pressure for long term stroke prevention and future cardiac issues.  Obstructive sleep apnea can also cause issues with blood pressure control and also potentiate the incidence of cardiac arrythmias in the future. If not done so already, recommend outpatient evaluation with a sleep study for obstructive sleep apnea.   Take blood pressure medications as prescribed.  Check blood pressure twice daily (make sure to use correct size blood pressure cuff).  Maintain a log to be reviewed by outpatient provider managing your blood pressure.  Maintain low sodium diet (less than 2000mg=2grams daily)    Diagnosis: Paroxysmal atrial fibrillation  Assessment and Plan of Treatment: Abnormal Heart Rythym leading to more inefficient blood flow at higher heart rates. Medications adjusted to control heart rate. Take medications as prescribed. In addition, with atrial fibrillation, increased risk of developing clots in the heart which can travel to the rest of the body and cause trouble (example is stroke). In order to prevent blood clots, you are on a blood thinner that helps prevent blood clot formation. Take medication as prescribed. Being on a blood thinner will make cuts bleeding quicker/easier/longer and can cause bruising. Maintain prolonged pressure on any bleeding cuts. Caution and avoid trauma as major and minor bleeding can be potentiated by being on a blood thinner. If you notice any prolonged/persistent bleeding, seek medical attention.

## 2025-05-19 NOTE — DISCHARGE NOTE NURSING/CASE MANAGEMENT/SOCIAL WORK - NURSING SECTION COMPLETE
I have reviewed discharge instructions with the patient. The patient verbalized understanding. Patient left ED via Discharge Method: ambulatory to Home with family. Opportunity for questions and clarification provided. Patient given 0 scripts. To continue your aftercare when you leave the hospital, you may receive an automated call from our care team to check in on how you are doing. This is a free service and part of our promise to provide the best care and service to meet your aftercare needs.  If you have questions, or wish to unsubscribe from this service please call [phone]. Thank you for Choosing our [facility] Emergency Department. Patient/Caregiver provided printed discharge information.

## 2025-05-19 NOTE — DISCHARGE NOTE PROVIDER - NSDCMRMEDTOKEN_GEN_ALL_CORE_FT
Cipro 500 mg oral tablet: 1 tab(s) orally every 12 hours  Coreg 12.5 mg oral tablet: 1 tab(s) orally every 12 hours  Norvasc 5 mg oral tablet: 1 tab(s) orally once a day  Xarelto 20 mg oral tablet: 1 tab(s) orally once a day

## 2025-05-19 NOTE — CONSULT NOTE ADULT - ASSESSMENT
Assessment:  69M with BPH, valvular heart disease, HLD, paroxysmal AFib on Xarelto, hx DVT, lumbar spinal stenosis s/p laminectomy with spinal fusion, s/p cervical spinal fusion, and L hip replacement, initially presented to ED 5/15 with fever and chills after cystoscopy on 5/14, diagnosed with UTI and discharged on antibiotic, called back to ED 5/17 after BCx and UCx growing Citrobacter  Patient reports feeling better, no fever or chills, eventually started on IV zosyn empirically   Course complicated by rash after infusion of hydralazine and zosyn, unclear which caused it  Afebrile on admission, on RA  No leukocytosis  Cr 1.1  UCx 5/15 Citrobacter koseri  BCx 5/15 Citrobacter koseri  CTAP negative    Antimicrobials:  meropenem Injectable 1000 every 8 hours (5/18 --- )    Impression:   #Citrobacter koseri Bacteremia, Complicated UTI  #Zosyn Allergy, Hives    Recommendations:  - continue empiric Meropenem 1G q8 (Day #1)  - monitor temperature curve  - trend WBC  - reason for abx use reviewed with patient  - side effects of antibiotic discussed, tolerating abx well so far  - Prior cultures reviewed. An epidemiologic assessment was performed. There is a significant risk for resistant microorganisms to spread to family members, and/or healthcare staff. Isolation precautions based on infection control policy. Will reconsider further isolation measures based on new culture results and other clinical data as appropriate Appropriate cultures collected and an appropriate broad spectrum antibiotic therapy will be considered  - follow up repaet BCx x2 (5/17)  - follow up Citrobacter sensitivity   - rest per primary team    Clinical team may change from intravenous to oral antibiotics when the following criteria are met:   1. Patient is clinically improving/stable       a)	Improved signs and symptoms of infection from initial presentation       b)	Afebrile for 24 hours       c)	Leukocytosis trending towards normal range   2. Patient is tolerating oral intake   3. Initial/repeat blood cultures are negative OR do not need to wait for preliminary blood cultures to result    Cannot advise changing to oral antibiotic therapy until culture sensitivity is available.  
patient with citrobacter bacteremia s/p cystoscopy with HTN response during allergic reaction, on coreg and amlodipine.  Home medication includes doxazosin for prostate/BP control  [id number] on amlodipine and coreg; continue current medications for now; if BP drops low, stop carvedilol; try to reinitiate doxazosin once stable  2-bacteremia; repeat cultures; rs+ cultures suggest regular ECHO; if no further bacteremia, no further cardiac evaluation

## 2025-05-19 NOTE — DISCHARGE NOTE NURSING/CASE MANAGEMENT/SOCIAL WORK - NSDCPEFALRISK_GEN_ALL_CORE
For information on Fall & Injury Prevention, visit: [email or web address] OR  [email or web address] OR  https://www.cdc.gov/steadi/patient.html

## 2025-05-19 NOTE — DISCHARGE NOTE PROVIDER - HOSPITAL COURSE
FROM H&P:    "69M with PMHx of BPH, valvular heart disease, HLD, paroxysmal AFib on Xarelto, hx DVT, lumbar spinal stenosis s/p laminectomy with spinal fusion, s/p cervical spinal fusion, and L hip replacement presents to ED for positive blood cultures. Pt initially presented to [facility] on 5/15 with fevers and chills following cystoscopy on 5/14. Today, blood cultures resulted positive for Citrobacter, and pt was contacted for admission for IV antibiotics. Patient otherwise reports feeling well and no acute complaints. "      5/18: No acute events. Tolerating antibiotics. Zosyn to merrem in possible reaction to zosyn. ID consult/recs. Follow cultures. Vitals stable. Continue to monitor.     5/19: Vitals stable. Afebrile. Repeat blood cultures NGTD (called micro core @ 153 and confirmed). Cleared by ID.   Discharge home in stable condition and close outpatient follow up with primary and urology    Citrobacter Bacteremia.   Citrobacter UTI  - Source likely 2/2 cystoscopy, possible pyelonephritis.   - Pt had been taking cefuroxime as outpatient for past several days.   - initially with zosyn->possible reaction->changed to merrem (5/18)  - ID consult/recs    Paroxysmal atrial fibrillation.   -C/w Xarelto and norvasc and coreg    DVT ppx: xarelto.    T(C): 36.8 (05-19-25 @ 07:56), Max: 36.8 (05-18-25 @ 15:47)  HR: 68 (05-19-25 @ 07:56) (56 - 68)  BP: 146/63 (05-19-25 @ 07:56) (122/71 - 146/63)  RR: 18 (05-19-25 @ 07:56) (18 - 18)  SpO2: 100% (05-19-25 @ 07:56) (94% - 100%)  AAOx3; NAD  No JVD  RRR, Lungs

## 2025-05-19 NOTE — DISCHARGE NOTE PROVIDER - NSDCDCMDCOMP_GEN_ALL_CORE
Mom sent a message askign what do do as the baby is teething and not sleeping well at nights.  Advised per pediatric telephone protocol, MD Richar.  Tylenol q 4 hr, Ibuprofen q 6 hrs for 2 nights.  No teething gels as those are not recommended and not FDA approved for under 2 years old due to containing benzocaine.  Mom was advised.  Gum massage was advised as well.  Bonnie Pulido RN  Message handled by Nurse Triage.    
This document is complete and the patient is ready for discharge.

## 2025-05-20 LAB
CULTURE RESULTS: SIGNIFICANT CHANGE UP
SPECIMEN SOURCE: SIGNIFICANT CHANGE UP

## 2025-05-22 LAB
CULTURE RESULTS: SIGNIFICANT CHANGE UP
CULTURE RESULTS: SIGNIFICANT CHANGE UP
SPECIMEN SOURCE: SIGNIFICANT CHANGE UP
SPECIMEN SOURCE: SIGNIFICANT CHANGE UP

## 2025-05-23 ENCOUNTER — NON-APPOINTMENT (OUTPATIENT)
Age: 70
End: 2025-05-23

## 2025-05-23 DIAGNOSIS — N40.0 BENIGN PROSTATIC HYPERPLASIA WITHOUT LOWER URINARY TRACT SYMPTOMS: ICD-10-CM

## 2025-05-23 DIAGNOSIS — Z79.01 LONG TERM (CURRENT) USE OF ANTICOAGULANTS: ICD-10-CM

## 2025-05-23 DIAGNOSIS — I10 ESSENTIAL (PRIMARY) HYPERTENSION: ICD-10-CM

## 2025-05-23 DIAGNOSIS — Z79.899 OTHER LONG TERM (CURRENT) DRUG THERAPY: ICD-10-CM

## 2025-05-23 DIAGNOSIS — N39.0 URINARY TRACT INFECTION, SITE NOT SPECIFIED: ICD-10-CM

## 2025-05-23 DIAGNOSIS — Z86.718 PERSONAL HISTORY OF OTHER VENOUS THROMBOSIS AND EMBOLISM: ICD-10-CM
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
## 2025-05-23 DIAGNOSIS — E78.5 HYPERLIPIDEMIA, UNSPECIFIED: ICD-10-CM

## 2025-05-23 DIAGNOSIS — B96.89 OTHER SPECIFIED BACTERIAL AGENTS AS THE CAUSE OF DISEASES CLASSIFIED ELSEWHERE: ICD-10-CM

## 2025-05-23 DIAGNOSIS — T81.40XA INFECTION FOLLOWING A PROCEDURE, UNSPECIFIED, INITIAL ENCOUNTER: ICD-10-CM

## 2025-05-23 DIAGNOSIS — I48.0 PAROXYSMAL ATRIAL FIBRILLATION: ICD-10-CM

## 2025-05-23 DIAGNOSIS — R78.81 BACTEREMIA: ICD-10-CM

## 2025-09-16 ENCOUNTER — APPOINTMENT (OUTPATIENT)
Dept: ELECTROPHYSIOLOGY | Facility: CLINIC | Age: 70
End: 2025-09-16